# Patient Record
Sex: MALE | Race: WHITE | NOT HISPANIC OR LATINO | Employment: UNEMPLOYED | URBAN - METROPOLITAN AREA
[De-identification: names, ages, dates, MRNs, and addresses within clinical notes are randomized per-mention and may not be internally consistent; named-entity substitution may affect disease eponyms.]

---

## 2022-08-27 ENCOUNTER — HOSPITAL ENCOUNTER (INPATIENT)
Facility: HOSPITAL | Age: 62
LOS: 4 days | Discharge: HOME | DRG: 282 | End: 2022-08-31
Attending: EMERGENCY MEDICINE | Admitting: FAMILY MEDICINE
Payer: COMMERCIAL

## 2022-08-27 ENCOUNTER — APPOINTMENT (OUTPATIENT)
Dept: RADIOLOGY | Facility: HOSPITAL | Age: 62
DRG: 282 | End: 2022-08-27
Payer: COMMERCIAL

## 2022-08-27 ENCOUNTER — APPOINTMENT (EMERGENCY)
Dept: RADIOLOGY | Facility: HOSPITAL | Age: 62
DRG: 282 | End: 2022-08-27
Payer: COMMERCIAL

## 2022-08-27 DIAGNOSIS — F17.219 CIGARETTE NICOTINE DEPENDENCE WITH NICOTINE-INDUCED DISORDER: ICD-10-CM

## 2022-08-27 DIAGNOSIS — R79.89 LFT ELEVATION: ICD-10-CM

## 2022-08-27 DIAGNOSIS — K76.9 HEPATIC LESION: ICD-10-CM

## 2022-08-27 DIAGNOSIS — K85.90 PANCREATITIS: ICD-10-CM

## 2022-08-27 DIAGNOSIS — F10.10 ALCOHOL ABUSE: ICD-10-CM

## 2022-08-27 DIAGNOSIS — R16.0 LIVER MASS, RIGHT LOBE: Primary | ICD-10-CM

## 2022-08-27 PROBLEM — F17.200 NICOTINE DEPENDENCE: Status: ACTIVE | Noted: 2022-08-27

## 2022-08-27 PROBLEM — R82.90 ABNORMAL URINALYSIS: Status: ACTIVE | Noted: 2022-08-27

## 2022-08-27 LAB
ALBUMIN SERPL BCP-MCNC: 3.6 G/DL (ref 3.5–5)
ALP SERPL-CCNC: 120 U/L (ref 46–116)
ALT SERPL W P-5'-P-CCNC: 311 U/L (ref 12–78)
ANION GAP SERPL CALCULATED.3IONS-SCNC: 11 MMOL/L (ref 4–13)
AST SERPL W P-5'-P-CCNC: 239 U/L (ref 5–45)
BACTERIA UR QL AUTO: ABNORMAL /HPF
BASOPHILS # BLD AUTO: 0.07 THOUSANDS/ΜL (ref 0–0.1)
BASOPHILS NFR BLD AUTO: 1 % (ref 0–1)
BILIRUB SERPL-MCNC: 1.98 MG/DL (ref 0.2–1)
BILIRUB UR QL STRIP: ABNORMAL
BUN SERPL-MCNC: 11 MG/DL (ref 5–25)
CALCIUM SERPL-MCNC: 8.7 MG/DL (ref 8.3–10.1)
CHLORIDE SERPL-SCNC: 97 MMOL/L (ref 96–108)
CLARITY UR: CLEAR
CO2 SERPL-SCNC: 28 MMOL/L (ref 21–32)
COLOR UR: ABNORMAL
CREAT SERPL-MCNC: 1.12 MG/DL (ref 0.6–1.3)
EOSINOPHIL # BLD AUTO: 0.55 THOUSAND/ΜL (ref 0–0.61)
EOSINOPHIL NFR BLD AUTO: 4 % (ref 0–6)
ERYTHROCYTE [DISTWIDTH] IN BLOOD BY AUTOMATED COUNT: 13.3 % (ref 11.6–15.1)
FLUAV RNA RESP QL NAA+PROBE: NEGATIVE
FLUBV RNA RESP QL NAA+PROBE: NEGATIVE
GFR SERPL CREATININE-BSD FRML MDRD: 70 ML/MIN/1.73SQ M
GLUCOSE SERPL-MCNC: 111 MG/DL (ref 65–140)
GLUCOSE UR STRIP-MCNC: NEGATIVE MG/DL
HCT VFR BLD AUTO: 45 % (ref 36.5–49.3)
HGB BLD-MCNC: 14.6 G/DL (ref 12–17)
HGB UR QL STRIP.AUTO: ABNORMAL
HYALINE CASTS #/AREA URNS LPF: ABNORMAL /LPF
IMM GRANULOCYTES # BLD AUTO: 0.1 THOUSAND/UL (ref 0–0.2)
IMM GRANULOCYTES NFR BLD AUTO: 1 % (ref 0–2)
KETONES UR STRIP-MCNC: ABNORMAL MG/DL
LEUKOCYTE ESTERASE UR QL STRIP: NEGATIVE
LIPASE SERPL-CCNC: 4009 U/L (ref 73–393)
LYMPHOCYTES # BLD AUTO: 2.33 THOUSANDS/ΜL (ref 0.6–4.47)
LYMPHOCYTES NFR BLD AUTO: 16 % (ref 14–44)
MCH RBC QN AUTO: 30.9 PG (ref 26.8–34.3)
MCHC RBC AUTO-ENTMCNC: 32.4 G/DL (ref 31.4–37.4)
MCV RBC AUTO: 95 FL (ref 82–98)
MONOCYTES # BLD AUTO: 1.97 THOUSAND/ΜL (ref 0.17–1.22)
MONOCYTES NFR BLD AUTO: 13 % (ref 4–12)
NEUTROPHILS # BLD AUTO: 9.98 THOUSANDS/ΜL (ref 1.85–7.62)
NEUTS SEG NFR BLD AUTO: 65 % (ref 43–75)
NITRITE UR QL STRIP: POSITIVE
NON-SQ EPI CELLS URNS QL MICRO: ABNORMAL /HPF
NRBC BLD AUTO-RTO: 0 /100 WBCS
PH UR STRIP.AUTO: 5.5 [PH]
PLATELET # BLD AUTO: 188 THOUSANDS/UL (ref 149–390)
PMV BLD AUTO: 11.1 FL (ref 8.9–12.7)
POTASSIUM SERPL-SCNC: 3.6 MMOL/L (ref 3.5–5.3)
PROT SERPL-MCNC: 8.4 G/DL (ref 6.4–8.4)
PROT UR STRIP-MCNC: ABNORMAL MG/DL
RBC # BLD AUTO: 4.72 MILLION/UL (ref 3.88–5.62)
RBC #/AREA URNS AUTO: ABNORMAL /HPF
RSV RNA RESP QL NAA+PROBE: NEGATIVE
SARS-COV-2 RNA RESP QL NAA+PROBE: NEGATIVE
SODIUM SERPL-SCNC: 136 MMOL/L (ref 135–147)
SP GR UR STRIP.AUTO: 1.02 (ref 1–1.03)
UROBILINOGEN UR QL STRIP.AUTO: 4 E.U./DL
WBC # BLD AUTO: 15 THOUSAND/UL (ref 4.31–10.16)
WBC #/AREA URNS AUTO: ABNORMAL /HPF

## 2022-08-27 PROCEDURE — 85025 COMPLETE CBC W/AUTO DIFF WBC: CPT | Performed by: PHYSICIAN ASSISTANT

## 2022-08-27 PROCEDURE — 87086 URINE CULTURE/COLONY COUNT: CPT | Performed by: INTERNAL MEDICINE

## 2022-08-27 PROCEDURE — 99285 EMERGENCY DEPT VISIT HI MDM: CPT | Performed by: PHYSICIAN ASSISTANT

## 2022-08-27 PROCEDURE — 99223 1ST HOSP IP/OBS HIGH 75: CPT | Performed by: INTERNAL MEDICINE

## 2022-08-27 PROCEDURE — 99285 EMERGENCY DEPT VISIT HI MDM: CPT

## 2022-08-27 PROCEDURE — 74177 CT ABD & PELVIS W/CONTRAST: CPT

## 2022-08-27 PROCEDURE — 81001 URINALYSIS AUTO W/SCOPE: CPT | Performed by: PHYSICIAN ASSISTANT

## 2022-08-27 PROCEDURE — G1004 CDSM NDSC: HCPCS

## 2022-08-27 PROCEDURE — 36415 COLL VENOUS BLD VENIPUNCTURE: CPT | Performed by: PHYSICIAN ASSISTANT

## 2022-08-27 PROCEDURE — 80053 COMPREHEN METABOLIC PANEL: CPT | Performed by: PHYSICIAN ASSISTANT

## 2022-08-27 PROCEDURE — 96374 THER/PROPH/DIAG INJ IV PUSH: CPT

## 2022-08-27 PROCEDURE — C9113 INJ PANTOPRAZOLE SODIUM, VIA: HCPCS | Performed by: INTERNAL MEDICINE

## 2022-08-27 PROCEDURE — 96361 HYDRATE IV INFUSION ADD-ON: CPT

## 2022-08-27 PROCEDURE — 76705 ECHO EXAM OF ABDOMEN: CPT

## 2022-08-27 PROCEDURE — 93005 ELECTROCARDIOGRAM TRACING: CPT

## 2022-08-27 PROCEDURE — 83690 ASSAY OF LIPASE: CPT | Performed by: PHYSICIAN ASSISTANT

## 2022-08-27 PROCEDURE — 0241U HB NFCT DS VIR RESP RNA 4 TRGT: CPT | Performed by: FAMILY MEDICINE

## 2022-08-27 RX ORDER — FOLIC ACID 1 MG/1
1 TABLET ORAL DAILY
Status: DISCONTINUED | OUTPATIENT
Start: 2022-08-28 | End: 2022-08-31 | Stop reason: HOSPADM

## 2022-08-27 RX ORDER — NICOTINE 21 MG/24HR
1 PATCH, TRANSDERMAL 24 HOURS TRANSDERMAL DAILY
Status: DISCONTINUED | OUTPATIENT
Start: 2022-08-28 | End: 2022-08-31 | Stop reason: HOSPADM

## 2022-08-27 RX ORDER — ONDANSETRON 2 MG/ML
4 INJECTION INTRAMUSCULAR; INTRAVENOUS ONCE
Status: COMPLETED | OUTPATIENT
Start: 2022-08-27 | End: 2022-08-27

## 2022-08-27 RX ORDER — ACETAMINOPHEN 325 MG/1
650 TABLET ORAL EVERY 6 HOURS PRN
Status: DISCONTINUED | OUTPATIENT
Start: 2022-08-27 | End: 2022-08-31 | Stop reason: HOSPADM

## 2022-08-27 RX ORDER — PANTOPRAZOLE SODIUM 40 MG/10ML
40 INJECTION, POWDER, LYOPHILIZED, FOR SOLUTION INTRAVENOUS
Status: DISCONTINUED | OUTPATIENT
Start: 2022-08-27 | End: 2022-08-28

## 2022-08-27 RX ORDER — ONDANSETRON 2 MG/ML
4 INJECTION INTRAMUSCULAR; INTRAVENOUS EVERY 6 HOURS PRN
Status: DISCONTINUED | OUTPATIENT
Start: 2022-08-27 | End: 2022-08-31 | Stop reason: HOSPADM

## 2022-08-27 RX ORDER — SODIUM CHLORIDE, SODIUM LACTATE, POTASSIUM CHLORIDE, CALCIUM CHLORIDE 600; 310; 30; 20 MG/100ML; MG/100ML; MG/100ML; MG/100ML
150 INJECTION, SOLUTION INTRAVENOUS CONTINUOUS
Status: DISCONTINUED | OUTPATIENT
Start: 2022-08-27 | End: 2022-08-28

## 2022-08-27 RX ADMIN — SODIUM CHLORIDE 1000 ML: 0.9 INJECTION, SOLUTION INTRAVENOUS at 13:36

## 2022-08-27 RX ADMIN — ONDANSETRON 4 MG: 2 INJECTION INTRAMUSCULAR; INTRAVENOUS at 16:46

## 2022-08-27 RX ADMIN — MORPHINE SULFATE 2 MG: 2 INJECTION, SOLUTION INTRAMUSCULAR; INTRAVENOUS at 22:55

## 2022-08-27 RX ADMIN — THIAMINE HYDROCHLORIDE 100 MG: 100 INJECTION, SOLUTION INTRAMUSCULAR; INTRAVENOUS at 23:04

## 2022-08-27 RX ADMIN — SODIUM CHLORIDE, SODIUM LACTATE, POTASSIUM CHLORIDE, AND CALCIUM CHLORIDE 150 ML/HR: .6; .31; .03; .02 INJECTION, SOLUTION INTRAVENOUS at 20:09

## 2022-08-27 RX ADMIN — IOHEXOL 65 ML: 350 INJECTION, SOLUTION INTRAVENOUS at 18:19

## 2022-08-27 RX ADMIN — PANTOPRAZOLE SODIUM 40 MG: 40 INJECTION, POWDER, FOR SOLUTION INTRAVENOUS at 22:58

## 2022-08-27 NOTE — ED PROVIDER NOTES
History  Chief Complaint   Patient presents with    Abdominal Pain     C/O mid upper abd pain X 3 days, worse today  Patient reports gas, no appetite, and fatigue  Denies N/V/D     Patient is a 52-year-old white male with no significant past medical history who reports onset 2 days ago of epigastric pain, sweating and vomiting 1 hour after eating erica steak and mashed potatoes  States he only drank water yesterday, then ate couple bites of cheeseburger today with  Increased epigastric pain and vomiting  No fever  Last bm 3 days ago; states he saw "couple spots of blood" in the stool  Pt is passing flatus  No cp, sob, melena, urinary symptoms  No prior abdominal surgery  Smokes 1 ppd  Drinks socially, states "sometimes 1 beer a day, sometimes a 6 pack on weekend, some days I don't drink"  Denies illicit drug use  Reports his pain is worse when he lies supine  None       History reviewed  No pertinent past medical history  History reviewed  No pertinent surgical history  History reviewed  No pertinent family history  I have reviewed and agree with the history as documented  E-Cigarette/Vaping    E-Cigarette Use Never User      E-Cigarette/Vaping Substances    Nicotine No     THC No     CBD No     Flavoring No     Other No     Unknown No      Social History     Tobacco Use    Smoking status: Current Every Day Smoker     Packs/day: 1 00   Vaping Use    Vaping Use: Never used   Substance Use Topics    Alcohol use: Yes     Comment: Occ   Drug use: Never       Review of Systems   Constitutional: Negative for chills and fever  HENT: Negative for ear pain and sore throat  Respiratory: Negative for cough and shortness of breath  Cardiovascular: Negative for chest pain and palpitations  Gastrointestinal: Positive for abdominal pain, blood in stool, nausea and vomiting  Negative for diarrhea  Genitourinary: Negative for dysuria and hematuria     Musculoskeletal: Negative for arthralgias and back pain  Skin: Negative for color change and rash  Neurological: Negative for syncope and headaches  All other systems reviewed and are negative  Physical Exam  Physical Exam  Vitals and nursing note reviewed  Constitutional:       Appearance: He is well-developed  HENT:      Head: Normocephalic and atraumatic  Mouth/Throat:      Mouth: Mucous membranes are moist       Pharynx: Oropharynx is clear  Eyes:      Extraocular Movements: Extraocular movements intact  Pupils: Pupils are equal, round, and reactive to light  Cardiovascular:      Rate and Rhythm: Normal rate and regular rhythm  Heart sounds: Normal heart sounds  Pulmonary:      Effort: Pulmonary effort is normal       Breath sounds: Normal breath sounds  Abdominal:      General: Abdomen is flat  Palpations: Abdomen is soft  Hernia: No hernia is present  Comments: Epigastric to R upper abdominal tenderness   Genitourinary:     Rectum: Normal  Guaiac result negative  No mass or tenderness  Skin:     General: Skin is warm and dry  Capillary Refill: Capillary refill takes less than 2 seconds  Neurological:      Mental Status: He is alert           Vital Signs  ED Triage Vitals [08/27/22 1259]   Temperature Pulse Respirations Blood Pressure SpO2   99 °F (37 2 °C) 69 20 (!) 178/103 98 %      Temp Source Heart Rate Source Patient Position - Orthostatic VS BP Location FiO2 (%)   Tympanic Monitor Sitting Right arm --      Pain Score       3           Vitals:    08/27/22 1929 08/27/22 2353 08/28/22 0319 08/28/22 0806   BP: 153/89 158/88 162/85 160/86   Pulse: 77 79 76 74   Patient Position - Orthostatic VS:    Lying         Visual Acuity      ED Medications  Medications   nicotine (NICODERM CQ) 21 mg/24 hr TD 24 hr patch 1 patch (1 patch Transdermal Not Given 8/28/22 0975)   thiamine (VITAMIN B1) 100 mg in sodium chloride 0 9 % 50 mL IVPB (100 mg Intravenous New Bag 8/28/22 0959) acetaminophen (TYLENOL) tablet 650 mg (has no administration in time range)   ondansetron (ZOFRAN) injection 4 mg (has no administration in time range)   pantoprazole (PROTONIX) injection 40 mg (40 mg Intravenous Given 9/67/88 9624)   folic acid (FOLVITE) tablet 1 mg (1 mg Oral Given 8/28/22 0953)   multivitamin-minerals (CENTRUM) tablet 1 tablet (1 tablet Oral Given 8/28/22 0953)   morphine injection 2 mg (2 mg Intravenous Given 8/27/22 2255)   sodium chloride 0 9 % bolus 1,000 mL (0 mL Intravenous Stopped 8/27/22 1646)   iohexol (OMNIPAQUE) 350 MG/ML injection (MULTI-DOSE) 65 mL (65 mL Intravenous Given 8/27/22 1819)   barium (READI-CAT 2) suspension 900 mL (900 mL Oral Given 8/27/22 1646)   ondansetron (ZOFRAN) injection 4 mg (4 mg Intravenous Given 8/27/22 1646)       Diagnostic Studies  Results Reviewed     Procedure Component Value Units Date/Time    Lipase [[id number]]  (Abnormal) Collected: 08/28/22 0525    Lab Status: Final result Specimen: Blood from Arm, Right Updated: 08/28/22 0657     Lipase 1,988 u/L     Comprehensive metabolic panel [[id number]]  (Abnormal) Collected: 08/28/22 0525    Lab Status: Final result Specimen: Blood from Arm, Right Updated: 08/28/22 0651     Sodium 138 mmol/L      Potassium 4 2 mmol/L      Chloride 102 mmol/L      CO2 29 mmol/L      ANION GAP 7 mmol/L      BUN 11 mg/dL      Creatinine 0 99 mg/dL      Glucose 93 mg/dL      Calcium 8 1 mg/dL      Corrected Calcium 9 1 mg/dL       U/L       U/L      Alkaline Phosphatase 90 U/L      Total Protein 6 7 g/dL      Albumin 2 8 g/dL      Total Bilirubin 2 48 mg/dL      eGFR 81 ml/min/1 73sq m     Narrative:      [place] guidelines for Chronic Kidney Disease (CKD):     Stage 1 with normal or high GFR (GFR > 90 mL/min/1 73 square meters)    Stage 2 Mild CKD (GFR = 60-89 mL/min/1 73 square meters)    Stage 3A Moderate CKD (GFR = 45-59 mL/min/1 73 square meters)    Stage 3B Moderate CKD (GFR = 30-44 mL/min/1 73 square meters)    Stage 4 Severe CKD (GFR = 15-29 mL/min/1 73 square meters)    Stage 5 End Stage CKD (GFR <15 mL/min/1 73 square meters)  Note: GFR calculation is accurate only with a steady state creatinine    CBC and differential [[id number]]  (Abnormal) Collected: 08/28/22 0525    Lab Status: Final result Specimen: Blood from Arm, Right Updated: 08/28/22 0603     WBC 12 42 Thousand/uL      RBC 4 04 Million/uL      Hemoglobin 12 7 g/dL      Hematocrit 38 3 %      MCV 95 fL      MCH 31 4 pg      MCHC 33 2 g/dL      RDW 13 2 %      MPV 11 6 fL      Platelets 310 Thousands/uL      nRBC 0 /100 WBCs      Neutrophils Relative 58 %      Immat GRANS % 1 %      Lymphocytes Relative 18 %      Monocytes Relative 14 %      Eosinophils Relative 8 %      Basophils Relative 1 %      Neutrophils Absolute 7 24 Thousands/µL      Immature Grans Absolute 0 08 Thousand/uL      Lymphocytes Absolute 2 25 Thousands/µL      Monocytes Absolute 1 78 Thousand/µL      Eosinophils Absolute 1 01 Thousand/µL      Basophils Absolute 0 06 Thousands/µL     Urine culture [[id number]] Collected: 08/27/22 1434    Lab Status: In process Specimen: Urine Updated: 08/27/22 1931    FLU/RSV/COVID - if FLU/RSV clinically relevant [[id number]]  (Normal) Collected: 08/27/22 1735    Lab Status: Final result Specimen: Nares from Nose Updated: 08/27/22 1819     SARS-CoV-2 Negative     INFLUENZA A PCR Negative     INFLUENZA B PCR Negative     RSV PCR Negative    Narrative:      FOR PEDIATRIC PATIENTS - copy/paste COVID Guidelines URL to browser: [email or web address]    SARS-CoV-2 assay is a Nucleic Acid Amplification assay intended for the  qualitative detection of nucleic acid from SARS-CoV-2 in nasopharyngeal  swabs  Results are for the presumptive identification of SARS-CoV-2 RNA      Positive results are indicative of infection with SARS-CoV-2, the virus  causing COVID-19, but do not rule out bacterial infection or co-infection  with other viruses  Laboratories within the [place] and its  territories are required to report all positive results to the appropriate  public health authorities  Negative results do not preclude SARS-CoV-2  infection and should not be used as the sole basis for treatment or other  patient management decisions  Negative results must be combined with  clinical observations, patient history, and epidemiological information  This test has not been FDA cleared or approved  This test has been authorized by FDA under an Emergency Use Authorization  (EUA)  This test is only authorized for the duration of time the  declaration that circumstances exist justifying the authorization of the  emergency use of an in vitro diagnostic tests for detection of SARS-CoV-2  virus and/or diagnosis of COVID-19 infection under section 564(b)(1) of  the Act, 21 U  S C  [id number]-9(G)(0), unless the authorization is terminated  or revoked sooner  The test has been validated but independent review by FDA  and CLIA is pending  Test performed using [employer] Gene[employer]pert: This RT-PCR assay targets N2,  a region unique to SARS-CoV-2  A conserved region in the E-gene was chosen  for pan-Sarbecovirus detection which includes SARS-CoV-2      Urine Microscopic [[id number]]  (Abnormal) Collected: 08/27/22 1434    Lab Status: Final result Specimen: Urine, Clean Catch Updated: 08/27/22 1501     RBC, UA 2-4 /hpf      WBC, UA 0-1 /hpf      Epithelial Cells None Seen /hpf      Bacteria, UA Moderate /hpf      Hyaline Casts, UA 1-2 /lpf     UA w Reflex to Microscopic w Reflex to Culture [[id number]]  (Abnormal) Collected: 08/27/22 1434    Lab Status: Final result Specimen: Urine, Clean Catch Updated: 08/27/22 1440     Color, UA Concepcion     Clarity, UA Clear     Specific Gravity, UA 1 025     pH, UA 5 5     Leukocytes, UA Negative     Nitrite, UA Positive     Protein,  (2+) mg/dl      Glucose, UA Negative mg/dl Ketones, UA Trace mg/dl      Urobilinogen, UA 4 0 E U /dl      Bilirubin, UA Moderate     Occult Blood, UA Moderate    Lipase [[id number]]  (Abnormal) Collected: 08/27/22 1333    Lab Status: Final result Specimen: Blood from Arm, Left Updated: 08/27/22 1416     Lipase 4,009 u/L     Comprehensive metabolic panel [[id number]]  (Abnormal) Collected: 08/27/22 1333    Lab Status: Final result Specimen: Blood from Arm, Left Updated: 08/27/22 1400     Sodium 136 mmol/L      Potassium 3 6 mmol/L      Chloride 97 mmol/L      CO2 28 mmol/L      ANION GAP 11 mmol/L      BUN 11 mg/dL      Creatinine 1 12 mg/dL      Glucose 111 mg/dL      Calcium 8 7 mg/dL       U/L       U/L      Alkaline Phosphatase 120 U/L      Total Protein 8 4 g/dL      Albumin 3 6 g/dL      Total Bilirubin 1 98 mg/dL      eGFR 70 ml/min/1 73sq m     Narrative:      National Kid[employer] guidelines for Chronic Kidney Disease (CKD):     Stage 1 with normal or high GFR (GFR > 90 mL/min/1 73 square meters)    Stage 2 Mild CKD (GFR = 60-89 mL/min/1 73 square meters)    Stage 3A Moderate CKD (GFR = 45-59 mL/min/1 73 square meters)    Stage 3B Moderate CKD (GFR = 30-44 mL/min/1 73 square meters)    Stage 4 Severe CKD (GFR = 15-29 mL/min/1 73 square meters)    Stage 5 End Stage CKD (GFR <15 mL/min/1 73 square meters)  Note: GFR calculation is accurate only with a steady state creatinine    CBC and differential [[id number]]  (Abnormal) Collected: 08/27/22 1333    Lab Status: Final result Specimen: Blood from Arm, Left Updated: 08/27/22 1352     WBC 15 00 Thousand/uL      RBC 4 72 Million/uL      Hemoglobin 14 6 g/dL      Hematocrit 45 0 %      MCV 95 fL      MCH 30 9 pg      MCHC 32 4 g/dL      RDW 13 3 %      MPV 11 1 fL      Platelets 713 Thousands/uL      nRBC 0 /100 WBCs      Neutrophils Relative 65 %      Immat GRANS % 1 %      Lymphocytes Relative 16 %      Monocytes Relative 13 %      Eosinophils Relative 4 %      Basophils Relative 1 %      Neutrophils Absolute 9 98 Thousands/µL      Immature Grans Absolute 0 10 Thousand/uL      Lymphocytes Absolute 2 33 Thousands/µL      Monocytes Absolute 1 97 Thousand/µL      Eosinophils Absolute 0 55 Thousand/µL      Basophils Absolute 0 07 Thousands/µL                  CT abdomen pelvis with contrast   Final Result by Juan Christianson MD (08/27 1931)      4 cm segment 8 hepatic lesion suspicious for malignancy, likely primary malignancy or metastatic disease  13 mm maris hepatic node  The study was marked in [facility] for immediate notification  Workstation performed: [id number]         US right upper quadrant   Final Result by Izabel Garrett MD (08/27 2523)         1  Hepatic steatosis with the mass in the right lobe of the liver  Additional evaluation will be necessary to determine the nature of the mass  Maris hepatis and perihepatic lymph node enlargement  Malignancy is a diagnosis of exclusion  Evaluation with contrast-enhanced CT or MRI abdomen should be obtained  2   Contracted gallbladder with thickened wall and echogenic foci within it  Findings are not typical for acute cholecystitis  Gallbladder could also be better evaluated on the follow-up CT or MRI exam    The study was marked in EP[employer] for immediate notification        Workstation performed: [id number]                    Procedures  ECG 12 Lead Documentation Only    Date/Time: 8/27/2022 2:47 PM  Performed by: Padmaja Vasquez PA-C  Authorized by: Padmaja Vasquez PA-C     ECG reviewed by me, the ED Provider: yes    Patient location:  ED  Previous ECG:     Previous ECG:  Unavailable  Interpretation:     Interpretation: normal    Rate:     ECG rate:  87    ECG rate assessment: normal    Rhythm:     Rhythm: sinus rhythm    QRS:     QRS axis:  Normal    QRS intervals:  Normal  Other findings:     Other findings: LAE    Comments:      Sinus rhythm with premature supraventricular complexes              ED Course  ED Course as of 08/28/22 1110   Sat Aug 27, 2022   [address] [place] texted hospitalist Dr Km Chapa 20yo+    Wilder Sparrow Most Recent Value   SBIRT (23 yo +)    In order to provide better care to our patients, we are screening all of our patients for alcohol and drug use  Would it be okay to ask you these screening questions? No Filed at: 08/27/2022 1334                    MDM  Number of Diagnoses or Management Options  LFT elevation: new and requires workup  Liver mass, right lobe: new and requires workup  Pancreatitis: new and requires workup  Diagnosis management comments: 78-year-old white male presenting with epigastric pain and vomiting beginning 2 days prior  Patient with elevated wbc, elevated LFTs, and lipase  R liver mass on US and CT  Concern for malignancy   Admitted to hospitalist service        Amount and/or Complexity of Data Reviewed  Clinical lab tests: reviewed  Tests in the radiology section of CPT®: reviewed  Tests in the medicine section of CPT®: reviewed  Decide to obtain previous medical records or to obtain history from someone other than the patient: yes  Review and summarize past medical records: yes  Discuss the patient with other providers: yes  Independent visualization of images, tracings, or specimens: yes    Risk of Complications, Morbidity, and/or Mortality  Presenting problems: moderate  Diagnostic procedures: moderate  Management options: moderate    Patient Progress  Patient progress: stable      Disposition  Final diagnoses:   Liver mass, right lobe   LFT elevation   Pancreatitis     Time reflects when diagnosis was documented in both MDM as applicable and the Disposition within this note     Time User Action Codes Description Comment    8/27/2022  4:52 PM Celia Price Add [R16 0] Liver mass, right lobe     8/27/2022  4:52 PM Celia Price Add [R79 89] LFT elevation     8/27/2022  4:52 PM Celia Price Add [K85 90] Pancreatitis     8/27/2022  7:33 PM Julissalucia Kaplan Add [F10 10] Alcohol abuse       ED Disposition     ED Disposition   Admit    Condition   Stable    Date/Time   Sat Aug 27, 2022  4:51 PM    Comment   Case was discussed with Dr Stacy Chung and the patient's admission status was agreed to be Admission Status: inpatient status to the service of Dr Stacy Chung    Follow-up Information    None         There are no discharge medications for this patient  No discharge procedures on file      PDMP Review     None          ED Provider  Electronically Signed by           Chinedu Jerez PA-C  08/28/22 5288

## 2022-08-27 NOTE — H&P
History and Physical - [facility]    Patient Information: Aaron Living 65 y o  male MRN: [id number]  Unit/Bed#: ED 07 Encounter: [id number]  Admitting Physician: Ann-Marie Mckee MD  PCP: No primary care provider on file  Date of Admission:  08/27/22        Hospital Problem List:     Principal Problem:    Acute pancreatitis  Active Problems:    Abnormal LFTs    Alcohol abuse    Nicotine dependence    Abnormal urinalysis      Assessment/Plan:    * Acute pancreatitis  Assessment & Plan  Presented with epigastric and right upper quadrant pain for 3 days associated with nausea in nonbloody nonbilious vomiting  Symptoms worsened by and followed by eating  Denies any fever or chills  Noted to have elevated lipase of 4000, abnormal LFT  History of daily alcohol use  Mild leukocytosis  Other vital signs stable  Right upper quadrant and epigastric fullness present  · NPO  · IV fluids LR at 150 cc/hour  · Monitor abdominal exam  · Trend LFT, lipase  · Follow-up CT scan finding  · Lipid profile  · GI evaluation    Alcohol abuse  Assessment & Plan  Drinks about 3-4 beers on daily basis, last drink was 3 days ago  Currently appears anxious but cooperative  · Discussed alcohol cessation, patient is not interested  · Thiamine, folic acid, multivitamin  · CIWA protocol    Abnormal LFTs  Assessment & Plan  Noted to have elevated AST and ALT  Bilirubin 1 9, alkaline  Mildly elevated 120  Ultrasound the right upper quadrant revealed hepatic steatosis with mass in right lower lobe  Also noted to have maris hepatic and perihepatic lymph node enlargement  Gallbladder noted to be contracted with thickened walled and echogenic foci    · Trend LFT  · Hepatitis panel  · Check PT/INR,  · AFP  · Follow-up CTs finding  · GI evaluation    Nicotine dependence  Assessment & Plan  Smokes about 1 pack a day   Offered nicotine replacement therapy but patient declines    Abnormal urinalysis  Assessment & Plan  UA noted to have 2+ protein, positive nitrite, negative leukocyte, trace ketone, moderate bilirubin, blood, 2-4 RBC, 0-1 WBC, moderate bacteria  Patient denies any urinary complaints except frequency  Denies any hematuria or dysuria  · Monitor          VTE Prophylaxis: Early ambulation  / sequential compression device   Code Status: Level 1 - Full Code    Anticipated Length of Stay:  Patient will be admitted on an Inpatient basis with an anticipated length of stay of  > 2 midnights  Justification for Hospital Stay:  Abnormal LFT, pancreatitis    Total Time for Visit, including Counseling / Coordination of Care: 45 minutes  Greater than 50% of this total time spent on direct patient counseling and coordination of care  Chief Complaint:     Abdominal Pain (C/O mid upper abd pain X 3 days, worse today  Patient reports gas, no appetite, and fatigue  Denies N/V/D)    History of Present Illness:    Ry Scott is a 65 y o  male with no known past medical history who presents with abdominal pain for 2 days  Patient reported that he developed epigastric pain associated with sweating and vomiting 1 hour after eating erica steak and mashed potatoes  Due to his symptoms he mainly drink water next day and subsequently attempted to have some bites of cheeseburger and subsequently had increasing epigastric pain and vomiting  Patient's pain continued and he presented to ED for further evaluation  In ED patient was noted to have T-max of maintain degree F blood pressure was elevated on presentation 178/103 other vital signs were stable  Workup revealed elevated lipase, elevated LFT and leukocytosis  Ultrasound of the right upper quadrant revealed hepatic steatosis with mass in right lobe of liver  Patient was also noted to have maris hepatic and perihepatic lymph node enlargement  Gallbladder was contracted with thickened walled an echogenic foci  Patient was given IV fluids, IV Zofran    CT scan of the abdomen pelvis was ordered with contrast and patient was subsequently referred for admission  Patient still reports epigastric and right upper quadrant pain but appears comfortable  He denies any fever, chills, chest pain, shortness of breath, hematemesis, melena, dysuria  He reported that he did not have bowel movements but had bowel movements after drinking contrast for CT scan  Patient does not take any medications and smokes about pack a day  He also reported that he has been drinking beer almost daily basis at least 3 4 cans a day and last drink was 3 days ago before symptoms started  Patient reports that he has not been receiving maintenance healthcare and he does not have PCP  He has last seen physician was at age of 17  Review of Systems:    Review of Systems   Constitutional: Positive for appetite change  Respiratory: Negative for shortness of breath  Cardiovascular: Negative for chest pain  Gastrointestinal: Positive for abdominal pain, blood in stool, nausea and vomiting  Negative for diarrhea  Past Medical and Surgical History:     History reviewed  No pertinent past medical history  History reviewed  No pertinent surgical history  Meds/Allergies:    PTA meds:   None       Allergies: No Known Allergies  History:     Marital Status: Single     Substance Use History:   Social History     Substance and Sexual Activity   Alcohol Use Yes    Comment: Occ  Social History     Tobacco Use   Smoking Status Current Every Day Smoker    Packs/day: 1 00   Smokeless Tobacco Not on file     Social History     Substance and Sexual Activity   Drug Use Never       Family History:    History reviewed  No pertinent family history      Physical Exam:     Vitals:   Blood Pressure: 167/81 (08/27/22 1730)  Pulse: 74 (08/27/22 1730)  Temperature: 99 °F (37 2 °C) (08/27/22 1259)  Temp Source: Tympanic (08/27/22 1259)  Respirations: 18 (08/27/22 1730)  Height: 5' 7" (170 2 cm) (08/27/22 1335)  Weight - Scale: 66 9 kg (147 lb 7 8 oz) (08/27/22 1335)  SpO2: 99 % (08/27/22 1730)    Physical Exam  Constitutional:       General: He is not in acute distress  Appearance: He is not ill-appearing  HENT:      Head: Normocephalic and atraumatic  Cardiovascular:      Rate and Rhythm: Normal rate  Pulmonary:      Effort: Pulmonary effort is normal  No respiratory distress  Breath sounds: No wheezing, rhonchi or rales  Chest:      Chest wall: No tenderness  Abdominal:      General: Bowel sounds are normal  There is distension (Mild)  Palpations: Abdomen is soft  Tenderness: There is abdominal tenderness in the right upper quadrant and epigastric area  There is no guarding or rebound  Musculoskeletal:      Cervical back: Neck supple  Right lower leg: No edema  Left lower leg: No edema  Skin:     General: Skin is warm and dry  Findings: No rash  Neurological:      General: No focal deficit present  Mental Status: He is alert and oriented to person, place, and time  Mental status is at baseline  Cranial Nerves: No cranial nerve deficit  Sensory: No sensory deficit  Lab Results: I have personally reviewed pertinent reports  Results from last 7 days   Lab Units 08/27/22  1333   WBC Thousand/uL 15 00*   HEMOGLOBIN g/dL 14 6   HEMATOCRIT % 45 0   PLATELETS Thousands/uL 188   NEUTROS PCT % 65   LYMPHS PCT % 16   MONOS PCT % 13*   EOS PCT % 4     Results from last 7 days   Lab Units 08/27/22  1333   POTASSIUM mmol/L 3 6   CHLORIDE mmol/L 97   CO2 mmol/L 28   BUN mg/dL 11   CREATININE mg/dL 1 12   CALCIUM mg/dL 8 7   ALK PHOS U/L 120*   ALT U/L 311*   AST U/L 239*           Imaging: I have personally reviewed pertinent reports  US right upper quadrant    Result Date: 8/27/2022  Narrative: RIGHT UPPER QUADRANT ULTRASOUND INDICATION:     epigastric pain, vomiting after eating beginning thursday   COMPARISON:  None TECHNIQUE:   Real-time ultrasound of the right upper quadrant was performed with a curvilinear transducer with both volumetric sweeps and still imaging techniques  FINDINGS: PANCREAS:  Portions of the pancreas are obscured by bowel gas  Visualized portions of the pancreas are unremarkable  AORTA AND IVC:  Visualized portions are normal for patient age  LIVER: Size:  Within normal range  The liver measures 16 3 cm in the midclavicular line  Contour:  Surface contour is smooth  Parenchyma: There is moderate diffuse increased echogenicity with smooth echotexture and acoustic beam attenuation  Most consistent with moderate hepatic steatosis  There is a hypoechoic mass in the right lobe of the liver measuring 3 7 x 3 2 x 3 7 cm  Limited imaging of the main portal vein shows it to be patent and hepatopetal  There are enlarged jeana hepatis nodes and perihepatic lymph nodes as well BILIARY: There is thickened gallbladder wall measuring 5 mm  Gallbladder is contracted  Echogenic non shadowing foci are demonstrated in the gallbladder  Due to the contracted gallbladder is difficult to determine if they represent small polyps or calculi  There is no pericholecystic fluid  Roberto Sarahy sign is negative  No intrahepatic biliary dilatation  CBD measures 4 0 mm  No choledocholithiasis  KIDNEY: Right kidney measures 11 4 x 6 2 x 7 1 cm  Volume 264 8 mL Kidney within normal limits  ASCITES:   None  Impression: 1  Hepatic steatosis with the mass in the right lobe of the liver  Additional evaluation will be necessary to determine the nature of the mass  Jeana hepatis and perihepatic lymph node enlargement  Malignancy is a diagnosis of exclusion  Evaluation with contrast-enhanced CT or MRI abdomen should be obtained  2   Contracted gallbladder with thickened wall and echogenic foci within it  Findings are not typical for acute cholecystitis  Gallbladder could also be better evaluated on the follow-up CT or MRI exam  The study was marked in EPIC for immediate notification  Workstation performed: [id number]       US right upper quadrant   Final Result         1  Hepatic steatosis with the mass in the right lobe of the liver  Additional evaluation will be necessary to determine the nature of the mass  Jeana hepatis and perihepatic lymph node enlargement  Malignancy is a diagnosis of exclusion  Evaluation with contrast-enhanced CT or MRI abdomen should be obtained  2   Contracted gallbladder with thickened wall and echogenic foci within it  Findings are not typical for acute cholecystitis  Gallbladder could also be better evaluated on the follow-up CT or MRI exam    The study was marked in EPIC for immediate notification  Workstation performed: [id number]         CT abdomen pelvis with contrast    (Results Pending)       EKG, Pathology, and Other Studies Reviewed on Admission:   · EKG sinus rhythm at 87 beats per minute with PVCs, LAE    [employer]/EPIC Records Reviewed: Yes     ** Please Note: "This note has been constructed using a voice recognition system  Therefore there may be syntax, spelling, and/or grammatical errors   Please call if you have any questions  "**

## 2022-08-27 NOTE — ASSESSMENT & PLAN NOTE
Drinks about 3-4 beers on daily basis, last drink was 8/25/22  · Discussed alcohol cessation, patient is not interested    · Thiamine, folic acid, multivitamin  · CIWA protocol

## 2022-08-27 NOTE — PLAN OF CARE
Problem: GASTROINTESTINAL - ADULT  Goal: Minimal or absence of nausea and/or vomiting  Description: INTERVENTIONS:  - Administer IV fluids if ordered to ensure adequate hydration  - Maintain NPO status until nausea and vomiting are resolved  - Nasogastric tube if ordered  - Administer ordered antiemetic medications as needed  - Provide nonpharmacologic comfort measures as appropriate  - Advance diet as tolerated, if ordered  - Consider nutrition services referral to assist patient with adequate nutrition and appropriate food choices  Outcome: Progressing  Goal: Maintains or returns to baseline bowel function  Description: INTERVENTIONS:  - Assess bowel function  - Encourage oral fluids to ensure adequate hydration  - Administer IV fluids if ordered to ensure adequate hydration  - Administer ordered medications as needed  - Encourage mobilization and activity  - Consider nutritional services referral to assist patient with adequate nutrition and appropriate food choices  Outcome: Progressing  Goal: Maintains adequate nutritional intake  Description: INTERVENTIONS:  - Monitor percentage of each meal consumed  - Identify factors contributing to decreased intake, treat as appropriate  - Assist with meals as needed  - Monitor I&O, weight, and lab values if indicated  - Obtain nutrition services referral as needed  Outcome: Progressing  Goal: Establish and maintain optimal ostomy function  Description: INTERVENTIONS:  - Assess bowel function  - Encourage oral fluids to ensure adequate hydration  - Administer IV fluids if ordered to ensure adequate hydration   - Administer ordered medications as needed  - Encourage mobilization and activity  - Nutrition services referral to assist patient with appropriate food choices  - Assess stoma site  - Consider wound care consult   Outcome: Progressing  Goal: Oral mucous membranes remain intact  Description: INTERVENTIONS  - Assess oral mucosa and hygiene practices  - Implement preventative oral hygiene regimen  - Implement oral medicated treatments as ordered  - Initiate Nutrition services referral as needed  Outcome: Progressing     Problem: METABOLIC, FLUID AND ELECTROLYTES - ADULT  Goal: Electrolytes maintained within normal limits  Description: INTERVENTIONS:  - Monitor labs and assess patient for signs and symptoms of electrolyte imbalances  - Administer electrolyte replacement as ordered  - Monitor response to electrolyte replacements, including repeat lab results as appropriate  - Instruct patient on fluid and nutrition as appropriate  Outcome: Progressing  Goal: Fluid balance maintained  Description: INTERVENTIONS:  - Monitor labs   - Monitor I/O and WT  - Instruct patient on fluid and nutrition as appropriate  - Assess for signs & symptoms of volume excess or deficit  Outcome: Progressing  Goal: Glucose maintained within target range  Description: INTERVENTIONS:  - Monitor Blood Glucose as ordered  - Assess for signs and symptoms of hyperglycemia and hypoglycemia  - Administer ordered medications to maintain glucose within target range  - Assess nutritional intake and initiate nutrition service referral as needed  Outcome: Progressing

## 2022-08-27 NOTE — ED NOTES
Ultrasound is aware of order and will be here within the hour  RN and PA made aware       Dany Valdes RN  08/27/22 6739

## 2022-08-28 PROBLEM — K76.9 HEPATIC LESION: Status: ACTIVE | Noted: 2022-08-27

## 2022-08-28 LAB
AFP-TM SERPL-MCNC: 8.2 NG/ML (ref 0.5–8)
ALBUMIN SERPL BCP-MCNC: 2.8 G/DL (ref 3.5–5)
ALP SERPL-CCNC: 90 U/L (ref 46–116)
ALT SERPL W P-5'-P-CCNC: 218 U/L (ref 12–78)
ANION GAP SERPL CALCULATED.3IONS-SCNC: 7 MMOL/L (ref 4–13)
AST SERPL W P-5'-P-CCNC: 164 U/L (ref 5–45)
BACTERIA UR CULT: NORMAL
BASOPHILS # BLD AUTO: 0.06 THOUSANDS/ΜL (ref 0–0.1)
BASOPHILS NFR BLD AUTO: 1 % (ref 0–1)
BILIRUB SERPL-MCNC: 2.48 MG/DL (ref 0.2–1)
BUN SERPL-MCNC: 11 MG/DL (ref 5–25)
CALCIUM ALBUM COR SERPL-MCNC: 9.1 MG/DL (ref 8.3–10.1)
CALCIUM SERPL-MCNC: 8.1 MG/DL (ref 8.3–10.1)
CHLORIDE SERPL-SCNC: 102 MMOL/L (ref 96–108)
CHOLEST SERPL-MCNC: 124 MG/DL
CO2 SERPL-SCNC: 29 MMOL/L (ref 21–32)
CREAT SERPL-MCNC: 0.99 MG/DL (ref 0.6–1.3)
EOSINOPHIL # BLD AUTO: 1.01 THOUSAND/ΜL (ref 0–0.61)
EOSINOPHIL NFR BLD AUTO: 8 % (ref 0–6)
ERYTHROCYTE [DISTWIDTH] IN BLOOD BY AUTOMATED COUNT: 13.2 % (ref 11.6–15.1)
GFR SERPL CREATININE-BSD FRML MDRD: 81 ML/MIN/1.73SQ M
GLUCOSE SERPL-MCNC: 93 MG/DL (ref 65–140)
HCT VFR BLD AUTO: 38.3 % (ref 36.5–49.3)
HDLC SERPL-MCNC: 28 MG/DL
HGB BLD-MCNC: 12.7 G/DL (ref 12–17)
IMM GRANULOCYTES # BLD AUTO: 0.08 THOUSAND/UL (ref 0–0.2)
IMM GRANULOCYTES NFR BLD AUTO: 1 % (ref 0–2)
INR PPP: 1.22 (ref 0.84–1.19)
LDLC SERPL CALC-MCNC: 80 MG/DL (ref 0–100)
LIPASE SERPL-CCNC: 1988 U/L (ref 73–393)
LYMPHOCYTES # BLD AUTO: 2.25 THOUSANDS/ΜL (ref 0.6–4.47)
LYMPHOCYTES NFR BLD AUTO: 18 % (ref 14–44)
MCH RBC QN AUTO: 31.4 PG (ref 26.8–34.3)
MCHC RBC AUTO-ENTMCNC: 33.2 G/DL (ref 31.4–37.4)
MCV RBC AUTO: 95 FL (ref 82–98)
MONOCYTES # BLD AUTO: 1.78 THOUSAND/ΜL (ref 0.17–1.22)
MONOCYTES NFR BLD AUTO: 14 % (ref 4–12)
NEUTROPHILS # BLD AUTO: 7.24 THOUSANDS/ΜL (ref 1.85–7.62)
NEUTS SEG NFR BLD AUTO: 58 % (ref 43–75)
NRBC BLD AUTO-RTO: 0 /100 WBCS
PLATELET # BLD AUTO: 132 THOUSANDS/UL (ref 149–390)
PMV BLD AUTO: 11.6 FL (ref 8.9–12.7)
POTASSIUM SERPL-SCNC: 4.2 MMOL/L (ref 3.5–5.3)
PROT SERPL-MCNC: 6.7 G/DL (ref 6.4–8.4)
PROTHROMBIN TIME: 15.6 SECONDS (ref 11.6–14.5)
RBC # BLD AUTO: 4.04 MILLION/UL (ref 3.88–5.62)
SODIUM SERPL-SCNC: 138 MMOL/L (ref 135–147)
TRIGL SERPL-MCNC: 80 MG/DL
WBC # BLD AUTO: 12.42 THOUSAND/UL (ref 4.31–10.16)

## 2022-08-28 PROCEDURE — 82105 ALPHA-FETOPROTEIN SERUM: CPT | Performed by: INTERNAL MEDICINE

## 2022-08-28 PROCEDURE — 85025 COMPLETE CBC W/AUTO DIFF WBC: CPT | Performed by: INTERNAL MEDICINE

## 2022-08-28 PROCEDURE — C9113 INJ PANTOPRAZOLE SODIUM, VIA: HCPCS | Performed by: INTERNAL MEDICINE

## 2022-08-28 PROCEDURE — 99254 IP/OBS CNSLTJ NEW/EST MOD 60: CPT | Performed by: INTERNAL MEDICINE

## 2022-08-28 PROCEDURE — 85610 PROTHROMBIN TIME: CPT | Performed by: INTERNAL MEDICINE

## 2022-08-28 PROCEDURE — 99233 SBSQ HOSP IP/OBS HIGH 50: CPT | Performed by: PHYSICIAN ASSISTANT

## 2022-08-28 PROCEDURE — 80053 COMPREHEN METABOLIC PANEL: CPT | Performed by: INTERNAL MEDICINE

## 2022-08-28 PROCEDURE — 83690 ASSAY OF LIPASE: CPT | Performed by: INTERNAL MEDICINE

## 2022-08-28 PROCEDURE — 80061 LIPID PANEL: CPT | Performed by: INTERNAL MEDICINE

## 2022-08-28 RX ORDER — PANTOPRAZOLE SODIUM 40 MG/1
40 TABLET, DELAYED RELEASE ORAL
Status: DISCONTINUED | OUTPATIENT
Start: 2022-08-29 | End: 2022-08-31 | Stop reason: HOSPADM

## 2022-08-28 RX ORDER — LANOLIN ALCOHOL/MO/W.PET/CERES
100 CREAM (GRAM) TOPICAL DAILY
Status: DISCONTINUED | OUTPATIENT
Start: 2022-08-29 | End: 2022-08-31 | Stop reason: HOSPADM

## 2022-08-28 RX ORDER — OXYCODONE HYDROCHLORIDE 5 MG/1
2.5 TABLET ORAL EVERY 6 HOURS PRN
Status: DISCONTINUED | OUTPATIENT
Start: 2022-08-28 | End: 2022-08-31 | Stop reason: HOSPADM

## 2022-08-28 RX ADMIN — FOLIC ACID 1 MG: 1 TABLET ORAL at 09:53

## 2022-08-28 RX ADMIN — PANTOPRAZOLE SODIUM 40 MG: 40 INJECTION, POWDER, FOR SOLUTION INTRAVENOUS at 09:53

## 2022-08-28 RX ADMIN — THIAMINE HYDROCHLORIDE 100 MG: 100 INJECTION, SOLUTION INTRAMUSCULAR; INTRAVENOUS at 09:59

## 2022-08-28 RX ADMIN — SODIUM CHLORIDE, SODIUM LACTATE, POTASSIUM CHLORIDE, AND CALCIUM CHLORIDE 150 ML/HR: .6; .31; .03; .02 INJECTION, SOLUTION INTRAVENOUS at 03:41

## 2022-08-28 RX ADMIN — Medication 1 TABLET: at 09:53

## 2022-08-28 NOTE — ASSESSMENT & PLAN NOTE
· UA noted to have 2+ protein, positive nitrite, negative leukocyte, trace ketone, moderate bilirubin, blood, 2-4 RBC, 0-1 WBC, moderate bacteria  · Patient denies any urinary complaints except frequency  Denies any hematuria or dysuria  · Monitor symptoms  No abx

## 2022-08-28 NOTE — ASSESSMENT & PLAN NOTE
· US RUQ reviewed  · CT a/p reviewed  · Trend LFT daily  · Hepatitis panel yet to be collected  · Check PT/INR, AFP  · GI evaluation

## 2022-08-28 NOTE — ASSESSMENT & PLAN NOTE
Presented with epigastric and right upper quadrant pain for 3 days associated with nausea in nonbloody nonbilious vomiting  Symptoms worsened by and followed by eating    · NPO  · IVF  · Monitor abdominal exam  · Trend LFT  · GI evaluation

## 2022-08-28 NOTE — UTILIZATION REVIEW
Initial Clinical Review    Admission: Date/Time/Statement:   Admission Orders (From admission, onward)     Ordered        08/27/22 [address]  Once                      Orders Placed This Encounter   Procedures    INPATIENT ADMISSION     Standing Status:   Standing     Number of Occurrences:   1     Order Specific Question:   Level of Care     Answer:   Med Surg [16]     Order Specific Question:   Estimated length of stay     Answer:   More than 2 Midnights     Order Specific Question:   Certification     Answer:   I certify that inpatient services are medically necessary for this patient for a duration of greater than two midnights  See H&P and MD Progress Notes for additional information about the patient's course of treatment  ED Arrival Information     Expected   -    Arrival   8/27/2022 12:49    Acuity   Urgent            Means of arrival   Walk-In    Escorted by   Self    Service   Hospitalist    Admission type   Urgent            Arrival complaint   abd pain           Chief Complaint   Patient presents with    Abdominal Pain     C/O mid upper abd pain X 3 days, worse today  Patient reports gas, no appetite, and fatigue  Denies N/V/D     Initial Presentation:   61-year-old white male with no significant past medical history who reports onset 2 days ago of epigastric pain, sweating and vomiting 1 hour after eating erica steak and mashed potatoes  States he only drank water yesterday, then ate couple bites of cheeseburger today with  Increased epigastric pain and vomiting  No fever  Last bm 3 days ago; states he saw "couple spots of blood" in the stool  Pt is passing flatus  No cp, sob, melena, urinary symptoms  No prior abdominal surgery  Smokes 1 ppd  Drinks socially  Denies illicit drug use  Reports his pain is worse when he lies supine  Admission work-up showing elevated Lipase, LFT's, hepatic lesion suspicious for malignancy on imaging    Admitted to inpatient status for acute pancreatitis  Started on IVF & pain mgt  Date: 8/28/22   Day 2:   Remains NPO with IVF maintained for acute pancreatitis  Lipase trending downward  GI consulted  ED Triage Vitals [08/27/22 1259]   Temperature Pulse Respirations Blood Pressure SpO2   99 °F (37 2 °C) 69 20 (!) 178/103 98 %      Temp Source Heart Rate Source Patient Position - Orthostatic VS BP Location FiO2 (%)   Tympanic Monitor Sitting Right arm --      Pain Score       3          Wt Readings from Last 1 Encounters:   08/27/22 66 9 kg (147 lb 7 8 oz)     Additional Vital Signs:   Date/Time Temp Pulse Resp BP MAP (mmHg) SpO2 O2 Device Patient Position - Orthostatic VS   08/28/22 08:06:12 98 5 °F (36 9 °C) 74 18 160/86 111 97 % None (Room air) Lying   08/28/22 03:19:53 99 6 °F (37 6 °C) 76 18 162/85 111 95 % -- --   08/27/22 23:53:07 99 3 °F (37 4 °C) 79 18 158/88 111 95 % -- --   08/27/22 1929 99 1 °F (37 3 °C) 77 19 153/89 -- -- -- --   08/27/22 1730 -- 74 18 167/81 116 99 % None (Room air) Lying   08/27/22 1715 -- 76 18 162/99 125 97 % None (Room air) Lying   08/27/22 1630 -- -- -- 169/79 114 -- -- Lying   08/27/22 1600 -- 81 20 186/87 Abnormal  125 98 % None (Room air) Lying   08/27/22 1500 -- 80 20 136/74 97 99 % None (Room air) Lying   08/27/22 1431 -- 82 20 172/89 Abnormal  124 100 % None (Room air) Lying   08/27/22 1259 99 °F (37 2 °C) 69 20 178/103 Abnormal  -- 98 % Nasal cannula Sitting     Pertinent Labs/Diagnostic Test Results:   CT abdomen pelvis with contrast   Final Result  (08/27 1931)      4 cm segment 8 hepatic lesion suspicious for malignancy, likely primary malignancy or metastatic disease  13 mm jeana hepatic node  US right upper quadrant   Final Result  (08/27 1553)      1  Hepatic steatosis with the mass in the right lobe of the liver  Additional evaluation will be necessary to determine the nature of the mass  Jeana hepatis and perihepatic lymph node enlargement  Malignancy is a diagnosis of exclusion  Evaluation with contrast-enhanced CT or MRI abdomen should be obtained  2   Contracted gallbladder with thickened wall and echogenic foci within it  Findings are not typical for acute cholecystitis    Gallbladder could also be better evaluated on the follow-up CT or MRI exam      Results from last 7 days   Lab Units 08/27/22  1735   SARS-COV-2  Negative     Results from last 7 days   Lab Units 08/28/22  0525 08/27/22  1333   WBC Thousand/uL 12 42* 15 00*   HEMOGLOBIN g/dL 12 7 14 6   HEMATOCRIT % 38 3 45 0   PLATELETS Thousands/uL 132* 188   NEUTROS ABS Thousands/µL 7 24 9 98*     Results from last 7 days   Lab Units 08/28/22  0525 08/27/22  1333   SODIUM mmol/L 138 136   POTASSIUM mmol/L 4 2 3 6   CHLORIDE mmol/L 102 97   CO2 mmol/L 29 28   ANION GAP mmol/L 7 11   BUN mg/dL 11 11   CREATININE mg/dL 0 99 1 12   EGFR ml/min/1 73sq m 81 70   CALCIUM mg/dL 8 1* 8 7     Results from last 7 days   Lab Units 08/28/22  0525 08/27/22  1333   AST U/L 164* 239*   ALT U/L 218* 311*   ALK PHOS U/L 90 120*   TOTAL PROTEIN g/dL 6 7 8 4   ALBUMIN g/dL 2 8* 3 6   TOTAL BILIRUBIN mg/dL 2 48* 1 98*     Results from last 7 days   Lab Units 08/28/22  0525 08/27/22  1333   GLUCOSE RANDOM mg/dL 93 111     Results from last 7 days   Lab Units 08/28/22  0525   PROTIME seconds 15 6*   INR  1 22*     Results from last 7 days   Lab Units 08/28/22  0525 08/27/22  1333   LIPASE u/L 1,988* 4,009*     Results from last 7 days   Lab Units 08/27/22  1434   CLARITY UA  Clear   COLOR UA  Concepcion   SPEC GRAV UA  1 025   PH UA  5 5   GLUCOSE UA mg/dl Negative   KETONES UA mg/dl Trace*   BLOOD UA  Moderate*   PROTEIN UA mg/dl 100 (2+)*   NITRITE UA  Positive*   BILIRUBIN UA  Moderate*   UROBILINOGEN UA E U /dl 4 0*   LEUKOCYTES UA  Negative   WBC UA /hpf 0-1   RBC UA /hpf 2-4   BACTERIA UA /hpf Moderate*   EPITHELIAL CELLS WET PREP /hpf None Seen     Results from last 7 days   Lab Units 08/27/22  1735   INFLUENZA A PCR  Negative   INFLUENZA B PCR Negative   RSV PCR  Negative     ED Treatment:   Medication Administration from 08/27/2022 1249 to 08/27/2022 1849       Date/Time Order Dose Route Action     08/27/2022 1336 sodium chloride 0 9 % bolus 1,000 mL 1,000 mL Intravenous New Bag     08/27/2022 1819 iohexol (OMNIPAQUE) 350 MG/ML injection (MULTI-DOSE) 65 mL 65 mL Intravenous Given     08/27/2022 1646 barium (READI-CAT 2) suspension 900 mL 900 mL Oral Given     08/27/2022 1646 ondansetron (ZOFRAN) injection 4 mg 4 mg Intravenous Given        History reviewed  No pertinent past medical history  Present on Admission:   Acute pancreatitis   Hepatic lesion with elevated LFTs   Alcohol abuse   Nicotine dependence   Abnormal urinalysis    Admitting Diagnosis: Pancreatitis [K85 90]  Abdominal pain [R10 9]  Liver mass, right lobe [R16 0]  LFT elevation [R79 89]  Age/Sex: 65 y o  male  Admission Orders:  Scd/foot pumps  Cont pulse ox  CIWA protocol  Aspiration precautions  Consult GI    Scheduled Medications:  folic acid, 1 mg, Oral, Daily  multivitamin-minerals, 1 tablet, Oral, Daily  nicotine, 1 patch, Transdermal, Daily  pantoprazole, 40 mg, Intravenous, Q24H [address]  thiamine, 100 mg, Intravenous, Daily    Continuous IV Infusions:  lactated ringers, 150 mL/hr, Intravenous, Continuous    PRN Meds:  acetaminophen, 650 mg, Oral, Q6H PRN  morphine injection, 2 mg, Intravenous, Q6H PRN  ondansetron, 4 mg, Intravenous, Q6H PRN    Network Utilization Review Department  ATTENTION: Please call with any questions or concerns to [phone] and carefully listen to the prompts so that you are directed to the right person  All voicemails are confidential   Chet Zapata all requests for admission clinical reviews, approved or denied determinations and any other requests to dedicated fax number below belonging to the campus where the patient is receiving treatment   List of dedicated fax numbers for the Facilities:  FACILITY NAME UR FAX NUMBER   ADMISSION DENIALS (Administrative/Medical Necessity) [phone]   [address] (Maternity/NICU/Pediatrics) [address] [facility],[address] Floor [facility] [address] [facility]  [phone]   Bernadette [address] [place] [address] [address] [place] [address] Traci Gavin [address] [facility] [phone]

## 2022-08-28 NOTE — PLAN OF CARE
Problem: GASTROINTESTINAL - ADULT  Goal: Minimal or absence of nausea and/or vomiting  Description: INTERVENTIONS:  - Administer IV fluids if ordered to ensure adequate hydration  - Maintain NPO status until nausea and vomiting are resolved  - Nasogastric tube if ordered  - Administer ordered antiemetic medications as needed  - Provide nonpharmacologic comfort measures as appropriate  - Advance diet as tolerated, if ordered  - Consider nutrition services referral to assist patient with adequate nutrition and appropriate food choices  Outcome: Progressing  Goal: Maintains or returns to baseline bowel function  Description: INTERVENTIONS:  - Assess bowel function  - Encourage oral fluids to ensure adequate hydration  - Administer IV fluids if ordered to ensure adequate hydration  - Administer ordered medications as needed  - Encourage mobilization and activity  - Consider nutritional services referral to assist patient with adequate nutrition and appropriate food choices  Outcome: Progressing  Goal: Maintains adequate nutritional intake  Description: INTERVENTIONS:  - Monitor percentage of each meal consumed  - Identify factors contributing to decreased intake, treat as appropriate  - Assist with meals as needed  - Monitor I&O, weight, and lab values if indicated  - Obtain nutrition services referral as needed  Outcome: Progressing  Goal: Establish and maintain optimal ostomy function  Description: INTERVENTIONS:  - Assess bowel function  - Encourage oral fluids to ensure adequate hydration  - Administer IV fluids if ordered to ensure adequate hydration   - Administer ordered medications as needed  - Encourage mobilization and activity  - Nutrition services referral to assist patient with appropriate food choices  - Assess stoma site  - Consider wound care consult   Outcome: Progressing  Goal: Oral mucous membranes remain intact  Description: INTERVENTIONS  - Assess oral mucosa and hygiene practices  - Implement preventative oral hygiene regimen  - Implement oral medicated treatments as ordered  - Initiate Nutrition services referral as needed  Outcome: Progressing     Problem: METABOLIC, FLUID AND ELECTROLYTES - ADULT  Goal: Electrolytes maintained within normal limits  Description: INTERVENTIONS:  - Monitor labs and assess patient for signs and symptoms of electrolyte imbalances  - Administer electrolyte replacement as ordered  - Monitor response to electrolyte replacements, including repeat lab results as appropriate  - Instruct patient on fluid and nutrition as appropriate  Outcome: Progressing  Goal: Fluid balance maintained  Description: INTERVENTIONS:  - Monitor labs   - Monitor I/O and WT  - Instruct patient on fluid and nutrition as appropriate  - Assess for signs & symptoms of volume excess or deficit  Outcome: Progressing  Goal: Glucose maintained within target range  Description: INTERVENTIONS:  - Monitor Blood Glucose as ordered  - Assess for signs and symptoms of hyperglycemia and hypoglycemia  - Administer ordered medications to maintain glucose within target range  - Assess nutritional intake and initiate nutrition service referral as needed  Outcome: Progressing     Problem: Nutrition/Hydration-ADULT  Goal: Nutrient/Hydration intake appropriate for improving, restoring or maintaining nutritional needs  Description: Monitor and assess patient's nutrition/hydration status for malnutrition  Collaborate with interdisciplinary team and initiate plan and interventions as ordered  Monitor patient's weight and dietary intake as ordered or per policy  Utilize nutrition screening tool and intervene as necessary  Determine patient's food preferences and provide high-protein, high-caloric foods as appropriate       INTERVENTIONS:  - Monitor oral intake, urinary output, labs, and treatment plans  - Assess nutrition and hydration status and recommend course of action  - Evaluate amount of meals eaten  - Assist patient with eating if necessary   - Allow adequate time for meals  - Recommend/ encourage appropriate diets, oral nutritional supplements, and vitamin/mineral supplements  - Order, calculate, and assess calorie counts as needed  - Recommend, monitor, and adjust tube feedings and TPN/PPN based on assessed needs  - Assess need for intravenous fluids  - Provide specific nutrition/hydration education as appropriate  - Include patient/family/caregiver in decisions related to nutrition  Outcome: Progressing

## 2022-08-28 NOTE — PROGRESS NOTES
[facility]  Progress Note Shanna Iglesias 1960, 65 y o  male MRN: [id number]  Unit/Bed#: 2 [place] [address] A Encounter: [id number]  Primary Care Provider: No primary care provider on file  Date and time admitted to hospital: 8/27/2022  1:02 PM    * Acute pancreatitis  Assessment & Plan  Presented with epigastric and right upper quadrant pain for 3 days associated with nausea in nonbloody nonbilious vomiting  Symptoms worsened by and followed by eating  · NPO  · IVF  · Monitor abdominal exam  · Trend LFT  · GI evaluation    Hepatic lesion with elevated LFTs  Assessment & Plan  · US RUQ reviewed  · CT a/p reviewed  · Trend LFT daily  · Hepatitis panel yet to be collected  · Check PT/INR, AFP  · GI evaluation    Abnormal urinalysis  Assessment & Plan  · UA noted to have 2+ protein, positive nitrite, negative leukocyte, trace ketone, moderate bilirubin, blood, 2-4 RBC, 0-1 WBC, moderate bacteria  · Patient denies any urinary complaints except frequency  Denies any hematuria or dysuria  · Monitor symptoms  No abx  Nicotine dependence  Assessment & Plan  Smokes about 1 pack a day  ·  Offered nicotine replacement therapy but patient declines    Alcohol abuse  Assessment & Plan  Drinks about 3-4 beers on daily basis, last drink was 8/25/22  · Discussed alcohol cessation, patient is not interested  · Thiamine, folic acid, multivitamin  · CIWA protocol        VTE Pharmacologic Prophylaxis: VTE Score: 1 Low Risk (Score 0-2) - Encourage Ambulation  Patient Centered Rounds: I performed bedside rounds with nursing staff today  Discussions with Specialists or Other Care Team Provider: gi    Education and Discussions with Family / Patient: Patient declined call to   Time Spent for Care: 30 minutes  More than 50% of total time spent on counseling and coordination of care as described above      Current Length of Stay: 1 day(s)  Current Patient Status: Inpatient   Certification Statement: The patient will continue to require additional inpatient hospital stay due to pending gi plan for hepatic lesion work up, pending diet advancement  Discharge Plan: Anticipate discharge in 24-48 hrs to home  Code Status: Level 1 - Full Code    Subjective:   Mild abd pain last night improved with morphine hasn't recurred  No n/v  Will try clears, he is requesting diet  Feels like he is full of fluids like is is "in the ocean"  No sob  He is asking when he can get home I educate him we will advance his treatment for the pancreatitis but we have to wait on the work up for the liver mass and he understands he wants to stay to have any work up done for the liver mass to find out what is the cause and what to do  Objective:     Vitals:   Temp (24hrs), Av 1 °F (37 3 °C), Min:98 5 °F (36 9 °C), Max:99 6 °F (37 6 °C)    Temp:  [98 5 °F (36 9 °C)-99 6 °F (37 6 °C)] 98 5 °F (36 9 °C)  HR:  [69-82] 74  Resp:  [18-20] 18  BP: (136-186)/() 160/86  SpO2:  [95 %-100 %] 97 %  Body mass index is 23 1 kg/m²  Input and Output Summary (last 24 hours): Intake/Output Summary (Last 24 hours) at 2022 0814  Last data filed at 2022 1646  Gross per 24 hour   Intake 1000 ml   Output --   Net 1000 ml       Physical Exam:   Physical Exam  Vitals and nursing note reviewed  Constitutional:       Appearance: Normal appearance  HENT:      Head: Normocephalic and atraumatic  Nose: Nose normal       Mouth/Throat:      Mouth: Mucous membranes are moist    Eyes:      Conjunctiva/sclera: Conjunctivae normal    Cardiovascular:      Rate and Rhythm: Normal rate and regular rhythm  Pulses: Normal pulses  Pulmonary:      Effort: Pulmonary effort is normal       Breath sounds: Normal breath sounds  Abdominal:      General: Bowel sounds are normal  There is no distension  Palpations: Abdomen is soft  Tenderness: There is no abdominal tenderness  There is no guarding     Musculoskeletal: General: Normal range of motion  Cervical back: Normal range of motion  Skin:     Coloration: Skin is jaundiced  Neurological:      General: No focal deficit present  Mental Status: He is alert     Psychiatric:         Mood and Affect: Mood normal       Comments: Pressured speech          Additional Data:     Labs:  Results from last 7 days   Lab Units 08/28/22  0525   WBC Thousand/uL 12 42*   HEMOGLOBIN g/dL 12 7   HEMATOCRIT % 38 3   PLATELETS Thousands/uL 132*   NEUTROS PCT % 58   LYMPHS PCT % 18   MONOS PCT % 14*   EOS PCT % 8*     Results from last 7 days   Lab Units 08/28/22  0525   SODIUM mmol/L 138   POTASSIUM mmol/L 4 2   CHLORIDE mmol/L 102   CO2 mmol/L 29   BUN mg/dL 11   CREATININE mg/dL 0 99   ANION GAP mmol/L 7   CALCIUM mg/dL 8 1*   ALBUMIN g/dL 2 8*   TOTAL BILIRUBIN mg/dL 2 48*   ALK PHOS U/L 90   ALT U/L 218*   AST U/L 164*   GLUCOSE RANDOM mg/dL 93     Results from last 7 days   Lab Units 08/28/22  0525   INR  1 22*                   Lines/Drains:  Invasive Devices  Report    Peripheral Intravenous Line  Duration           Peripheral IV 08/27/22 Left Antecubital <1 day                      Imaging: Reviewed radiology reports from this admission including: abdominal/pelvic CT    Recent Cultures (last 7 days):         Last 24 Hours Medication List:   Current Facility-Administered Medications   Medication Dose Route Frequency Provider Last Rate    acetaminophen  650 mg Oral Q6H PRN Ceasar Rubinstein, MD      folic acid  1 mg Oral Daily Ceasar Rubinstein, MD      lactated ringers  150 mL/hr Intravenous Continuous Ceasar Rubinstein,  mL/hr (08/28/22 0341)    morphine injection  2 mg Intravenous Q6H PRN Ceasar Rubinstein, MD      multivitamin-minerals  1 tablet Oral Daily Ceasar Rubinstein, MD      nicotine  1 patch Transdermal Daily Ceasar Rubinstein, MD      ondansetron  4 mg Intravenous Q6H PRN Ceasar Rubinstein, MD      pantoprazole  40 mg Intravenous Q24H [facility] Ceasar Rubinstein, MD  thiamine  100 mg Intravenous Daily Trevon Leija  mg (08/27/22 7044)        Today, Patient Was Seen By: Ronda Prater PA-C    **Please Note: This note may have been constructed using a voice recognition system  **

## 2022-08-28 NOTE — CONSULTS
Consultation - [facility]  [facility] Public 65 y o  male MRN: [id number]  Unit/Bed#: 2 [address] A Encounter: [id number]        Inpatient consult to gastroenterology  Consult performed by: Zamzam Winter PA-C  Consult ordered by: Blank You MD          Reason for Consult / Principal Problem:     ASSESSMENT and PLAN:      1  Acute on chronic pancreatitis  65year-old with significant daily alcohol use for the past 6 years presented with epigastric pain found to have elevated lipase of 4000  CT scan without acute pancreatitis however calcifications noted  Possible cholelithiasis on ultrasound  LFTs also elevated with a total bilirubin of 2 48 down trending to 1 89 today  Reports bowel movements are usually regular  Denies any recent unintentional weight loss  Denies prior known history of pancreatitis  Likely in the setting of alcohol use  -recommend continued aggressive fluid hydration  LR was recently discontinued  Recommend continuing at at least 150 mL an hour  Then can continue to decrease based on oral intake   -patient was adamant about advancing diet  Recommend only clear liquid diet for today  Possible advancement to low-fat tomorrow if doing well   -supportive care with pain medication antiemetics as needed   -continue to follow liver tests, CBC   -monitor abdominal exam    -discussed the importance of complete alcohol cessation  Patient initially joking around about this  The importance was discussed with him and he reports he would be more amenable to stopping   -recommend primary team providing resources to help with alcohol cessation     -if beginning to have postprandial diarrhea, consider pancreatic elastase with pancreatic enzyme supplementation in setting of chronic pancreatitis  -follow liver tests  -will be getting MRI for #2  Rule out choledocholithiasis at that time     -discussed with primary team    2   Hepatic lesion with elevated LFTs  Patient with new finding of 4 cm hepatic lesion noted suspicious for malignancy possibly primary or metastatic disease  Patient reports prior to this episode he was feeling well with no decreased appetite, unintentional weight loss or early satiety  He has chronic daily alcohol use for over 6 years  Patient also smokes a pack a day since the age of 16  No history of hepatitis  -obtain MRI with liver protocol for further evaluation of liver lesion   -if unable to diagnosis on this, patient will likely need liver biopsy   -will also rule out choledocholithiasis at that time due to elevated liver test and pancreatitis  -continue to follow liver enzymes  -follow-up chronic hepatitis panel    -again discussed the importance of complete alcohol cessation     -------------------------------------------------------------------------------------------------------------------    HPI:     Lauren Cristina is a 59-year-old male with past medical history of alcohol abuse who presented to the emergency room last night for epigastric pain  On arrival patient's vital signs were stable with blood pressure 178/103  Lipase was elevated initially at 4000, now 1988  LFTs elevated with total bilirubin of 2 48, AST of 164, ALT of 218 with normal alkaline phosphatase  Down trended this morning to total bilirubin of 1 98  White count elevated at 15 with normal hemoglobin  Thrombocytopenia of 132  CT scan with contrast noting 4 cm segment 8 hepatic lesions suspicious for malignancy possibly primary or metastatic disease  There is also a 13 mm maris hepatic node  Also evidence of chronic pancreatitis with calcification  Right upper quadrant ultrasound was then performed showing hepatic steatosis with mass in the right lobe of the liver gallbladder was contracted with thickened wall and cholelithiasis noted  No clear evidence of acute cholecystitis  Patient reports 4 days ago he ate food and immediately had an episode of emesis    This was followed by severe generalized abdominal cramping  He thought it was feeling better and ate a few bites of a cheeseburger and subsequently vomited this up as well  He reports prior to this he is feeling well  He reports his appetite was normal and his weight has been stable recently  Denies any recent unintentional weight loss  Denies any recent early satiety  He reports his bowel movements are on and off however no blood or melena noted  Patient reports daily alcohol use for at least 6 years  He reports increased amount recently  Currently drinking around 4-5 cans of beer a day  He also smokes a pack of cigarettes a day since the age of 16  Denies any history of hepatitis or IV drug use  Denies new medication  No prior abdominal surgeries  No known family history of GI malignancies  No prior EGD or colonoscopy      REVIEW OF SYSTEMS:    CONSTITUTIONAL: + improved decreased appetite  No unintentional weight loss or early satiety  HEENT: No earache or tinnitus  Denies hearing loss or visual disturbances  CARDIOVASCULAR: No chest pain or palpitations  RESPIRATORY: Denies any cough, hemoptysis, shortness of breath or dyspnea on exertion  GASTROINTESTINAL: As noted in the History of Present Illness  GENITOURINARY: No problems with urination  Denies any hematuria or dysuria  NEUROLOGIC: No dizziness or vertigo, denies headaches  MUSCULOSKELETAL: Denies any muscle or joint pain  SKIN: Denies skin rashes or itching  ENDOCRINE: Denies excessive thirst  Denies intolerance to heat or cold  PSYCHOSOCIAL: Denies depression or anxiety  Denies any recent memory loss  Historical Information   History reviewed  No pertinent past medical history  History reviewed  No pertinent surgical history  Social History   Social History     Substance and Sexual Activity   Alcohol Use Yes    Comment: Occ       Social History     Substance and Sexual Activity   Drug Use Never     Social History Tobacco Use   Smoking Status Current Every Day Smoker    Packs/day: 1 00   Smokeless Tobacco Not on file     History reviewed  No pertinent family history  Meds/Allergies     No medications prior to admission  Current Facility-Administered Medications   Medication Dose Route Frequency    acetaminophen (TYLENOL) tablet 650 mg  650 mg Oral N3L PRN    folic acid (FOLVITE) tablet 1 mg  1 mg Oral Daily    morphine injection 2 mg  2 mg Intravenous Q6H PRN    multivitamin-minerals (CENTRUM) tablet 1 tablet  1 tablet Oral Daily    nicotine (NICODERM CQ) 21 mg/24 hr TD 24 hr patch 1 patch  1 patch Transdermal Daily    ondansetron (ZOFRAN) injection 4 mg  4 mg Intravenous Q6H PRN    pantoprazole (PROTONIX) injection 40 mg  40 mg Intravenous Q24H ZULEYKA    thiamine (VITAMIN B1) 100 mg in sodium chloride 0 9 % 50 mL IVPB  100 mg Intravenous Daily       No Known Allergies        Objective     Blood pressure 160/86, pulse 74, temperature 98 5 °F (36 9 °C), temperature source Oral, resp  rate 18, height 5' 7" (1 702 m), weight 66 9 kg (147 lb 7 8 oz), SpO2 97 %  Intake/Output Summary (Last 24 hours) at 8/28/2022 1147  Last data filed at 8/28/2022 0601  Gross per 24 hour   Intake 2560 ml   Output --   Net 2560 ml         PHYSICAL EXAM:      General Appearance:   Alert, cooperative  Sitting comfortably in bed  Does not appear in distress   HEENT:   Normocephalic, atraumatic, anicteric  Neck:  Supple, symmetrical, trachea midline, no adenopathy  Lungs:   Clear to auscultation bilaterally; no rales, rhonchi or wheezing; respirations unlabored    Heart[de-identified]   S1 and S2 normal; regular rate and rhythm; no murmur, rub, or gallop  Abdomen:   + patient denies any tenderness to palpation throughout abdomen    Soft, nondistended      Genitalia:   Deferred    Rectal:   Deferred    Extremities:  No cyanosis, clubbing or edema    Pulses:  2+ and symmetric all extremities    Skin:  Skin color, texture, turgor normal, no rashes or lesions    Lymph nodes:  No palpable cervical, axillary or inguinal lymphadenopathy        Lab Results:   Results from last 7 days   Lab Units 08/28/22  0525   WBC Thousand/uL 12 42*   HEMOGLOBIN g/dL 12 7   HEMATOCRIT % 38 3   PLATELETS Thousands/uL 132*   NEUTROS PCT % 58   LYMPHS PCT % 18   MONOS PCT % 14*   EOS PCT % 8*     Results from last 7 days   Lab Units 08/28/22  0525   POTASSIUM mmol/L 4 2   CHLORIDE mmol/L 102   CO2 mmol/L 29   BUN mg/dL 11   CREATININE mg/dL 0 99   CALCIUM mg/dL 8 1*   ALK PHOS U/L 90   ALT U/L 218*   AST U/L 164*     Results from last 7 days   Lab Units 08/28/22  0525   INR  1 22*     Results from last 7 days   Lab Units 08/28/22  0525   LIPASE u/L 1,988*       Imaging Studies: I have personally reviewed pertinent imaging studies  US right upper quadrant    Result Date: 8/27/2022  Impression: 1  Hepatic steatosis with the mass in the right lobe of the liver  Additional evaluation will be necessary to determine the nature of the mass  Jeana hepatis and perihepatic lymph node enlargement  Malignancy is a diagnosis of exclusion  Evaluation with contrast-enhanced CT or MRI abdomen should be obtained  2   Contracted gallbladder with thickened wall and echogenic foci within it  Findings are not typical for acute cholecystitis  Gallbladder could also be better evaluated on the follow-up CT or MRI exam  The study was marked in EPIC for immediate notification  Workstation performed: [id number]     CT abdomen pelvis with contrast    Result Date: 8/27/2022  Impression: 4 cm segment 8 hepatic lesion suspicious for malignancy, likely primary malignancy or metastatic disease  13 mm jeana hepatic node  The study was marked in [facility] for immediate notification  Workstation performed: [id number]           Patient was seen and examined by Dr Brigida Dakins  All dean medical decisions were made by Dr Brigida Dakins  Thank you for allowing us to participate in the care of this present patient   We will follow-up with you closely

## 2022-08-29 ENCOUNTER — APPOINTMENT (OUTPATIENT)
Dept: RADIOLOGY | Facility: HOSPITAL | Age: 62
DRG: 282 | End: 2022-08-29
Payer: COMMERCIAL

## 2022-08-29 PROBLEM — B19.20 HEPATITIS C: Status: ACTIVE | Noted: 2022-08-29

## 2022-08-29 PROBLEM — R82.90 ABNORMAL URINALYSIS: Status: RESOLVED | Noted: 2022-08-27 | Resolved: 2022-08-29

## 2022-08-29 LAB
ALBUMIN SERPL BCP-MCNC: 2.8 G/DL (ref 3.5–5)
ALP SERPL-CCNC: 87 U/L (ref 46–116)
ALT SERPL W P-5'-P-CCNC: 200 U/L (ref 12–78)
ANION GAP SERPL CALCULATED.3IONS-SCNC: 9 MMOL/L (ref 4–13)
APTT PPP: 30 SECONDS (ref 23–37)
AST SERPL W P-5'-P-CCNC: 154 U/L (ref 5–45)
ATRIAL RATE: 87 BPM
BILIRUB SERPL-MCNC: 1.54 MG/DL (ref 0.2–1)
BUN SERPL-MCNC: 13 MG/DL (ref 5–25)
CALCIUM ALBUM COR SERPL-MCNC: 9.3 MG/DL (ref 8.3–10.1)
CALCIUM SERPL-MCNC: 8.3 MG/DL (ref 8.3–10.1)
CHLORIDE SERPL-SCNC: 103 MMOL/L (ref 96–108)
CO2 SERPL-SCNC: 28 MMOL/L (ref 21–32)
CREAT SERPL-MCNC: 0.93 MG/DL (ref 0.6–1.3)
ERYTHROCYTE [DISTWIDTH] IN BLOOD BY AUTOMATED COUNT: 12.9 % (ref 11.6–15.1)
GFR SERPL CREATININE-BSD FRML MDRD: 88 ML/MIN/1.73SQ M
GLUCOSE SERPL-MCNC: 100 MG/DL (ref 65–140)
HBV CORE AB SER QL: ABNORMAL
HBV CORE IGM SER QL: ABNORMAL
HBV SURFACE AG SER QL: ABNORMAL
HCT VFR BLD AUTO: 35.5 % (ref 36.5–49.3)
HCV AB SER QL: ABNORMAL
HGB BLD-MCNC: 11.5 G/DL (ref 12–17)
INR PPP: 1.24 (ref 0.84–1.19)
MCH RBC QN AUTO: 31.4 PG (ref 26.8–34.3)
MCHC RBC AUTO-ENTMCNC: 32.4 G/DL (ref 31.4–37.4)
MCV RBC AUTO: 97 FL (ref 82–98)
P AXIS: 48 DEGREES
PLATELET # BLD AUTO: 116 THOUSANDS/UL (ref 149–390)
PMV BLD AUTO: 11.5 FL (ref 8.9–12.7)
POTASSIUM SERPL-SCNC: 3.6 MMOL/L (ref 3.5–5.3)
PR INTERVAL: 134 MS
PROT SERPL-MCNC: 6.7 G/DL (ref 6.4–8.4)
PROTHROMBIN TIME: 15.7 SECONDS (ref 11.6–14.5)
QRS AXIS: 81 DEGREES
QRSD INTERVAL: 82 MS
QT INTERVAL: 400 MS
QTC INTERVAL: 481 MS
RBC # BLD AUTO: 3.66 MILLION/UL (ref 3.88–5.62)
SODIUM SERPL-SCNC: 140 MMOL/L (ref 135–147)
T WAVE AXIS: 72 DEGREES
VENTRICULAR RATE: 87 BPM
WBC # BLD AUTO: 7.89 THOUSAND/UL (ref 4.31–10.16)

## 2022-08-29 PROCEDURE — 86704 HEP B CORE ANTIBODY TOTAL: CPT | Performed by: INTERNAL MEDICINE

## 2022-08-29 PROCEDURE — 85027 COMPLETE CBC AUTOMATED: CPT | Performed by: PHYSICIAN ASSISTANT

## 2022-08-29 PROCEDURE — G1004 CDSM NDSC: HCPCS

## 2022-08-29 PROCEDURE — 86705 HEP B CORE ANTIBODY IGM: CPT | Performed by: INTERNAL MEDICINE

## 2022-08-29 PROCEDURE — 99232 SBSQ HOSP IP/OBS MODERATE 35: CPT | Performed by: NURSE PRACTITIONER

## 2022-08-29 PROCEDURE — 87340 HEPATITIS B SURFACE AG IA: CPT | Performed by: INTERNAL MEDICINE

## 2022-08-29 PROCEDURE — 85730 THROMBOPLASTIN TIME PARTIAL: CPT | Performed by: PHYSICIAN ASSISTANT

## 2022-08-29 PROCEDURE — 99232 SBSQ HOSP IP/OBS MODERATE 35: CPT | Performed by: INTERNAL MEDICINE

## 2022-08-29 PROCEDURE — A9585 GADOBUTROL INJECTION: HCPCS | Performed by: PHYSICIAN ASSISTANT

## 2022-08-29 PROCEDURE — 80053 COMPREHEN METABOLIC PANEL: CPT | Performed by: PHYSICIAN ASSISTANT

## 2022-08-29 PROCEDURE — 86803 HEPATITIS C AB TEST: CPT | Performed by: INTERNAL MEDICINE

## 2022-08-29 PROCEDURE — 85610 PROTHROMBIN TIME: CPT | Performed by: PHYSICIAN ASSISTANT

## 2022-08-29 PROCEDURE — 74183 MRI ABD W/O CNTR FLWD CNTR: CPT

## 2022-08-29 PROCEDURE — 93010 ELECTROCARDIOGRAM REPORT: CPT | Performed by: INTERNAL MEDICINE

## 2022-08-29 RX ORDER — POTASSIUM CHLORIDE 20 MEQ/1
40 TABLET, EXTENDED RELEASE ORAL ONCE
Status: COMPLETED | OUTPATIENT
Start: 2022-08-29 | End: 2022-08-29

## 2022-08-29 RX ORDER — CHLORDIAZEPOXIDE HYDROCHLORIDE 10 MG/1
10 CAPSULE, GELATIN COATED ORAL EVERY 8 HOURS SCHEDULED
Status: DISCONTINUED | OUTPATIENT
Start: 2022-08-29 | End: 2022-08-30

## 2022-08-29 RX ORDER — LORAZEPAM 0.5 MG/1
0.5 TABLET ORAL EVERY 8 HOURS PRN
Status: DISCONTINUED | OUTPATIENT
Start: 2022-08-29 | End: 2022-08-31 | Stop reason: HOSPADM

## 2022-08-29 RX ADMIN — PANTOPRAZOLE SODIUM 40 MG: 40 TABLET, DELAYED RELEASE ORAL at 06:15

## 2022-08-29 RX ADMIN — NICOTINE 1 PATCH: 21 PATCH, EXTENDED RELEASE TRANSDERMAL at 11:24

## 2022-08-29 RX ADMIN — POTASSIUM CHLORIDE 40 MEQ: 1500 TABLET, EXTENDED RELEASE ORAL at 11:24

## 2022-08-29 RX ADMIN — FOLIC ACID 1 MG: 1 TABLET ORAL at 11:24

## 2022-08-29 RX ADMIN — CHLORDIAZEPOXIDE HYDROCHLORIDE 10 MG: 10 CAPSULE ORAL at 11:23

## 2022-08-29 RX ADMIN — THIAMINE HCL TAB 100 MG 100 MG: 100 TAB at 11:23

## 2022-08-29 RX ADMIN — CHLORDIAZEPOXIDE HYDROCHLORIDE 10 MG: 10 CAPSULE ORAL at 21:06

## 2022-08-29 RX ADMIN — GADOBUTROL 7 ML: 604.72 INJECTION INTRAVENOUS at 11:05

## 2022-08-29 RX ADMIN — Medication 1 TABLET: at 11:23

## 2022-08-29 NOTE — ASSESSMENT & PLAN NOTE
Presented with epigastric and right upper quadrant pain for 3 days associated with nausea in nonbloody nonbilious vomiting  Symptoms worsened by and followed by eating  · Advanced to low fat diet   · Off IVF   Encourage oral hydration   · Supportive care with antiemetics  · Monitor abdominal exam  · Trend LFT  · Encourage alcohol and nicotine cessation  · GI following, appreciate input   · Follow-up MRI abdomen due to transaminitis and pancreatitis to rule out choledocholithiasis

## 2022-08-29 NOTE — PROGRESS NOTES
[address]  Progress Note Cheikh Armstrong 1960, 65 y o  male MRN: [id number]  Unit/Bed#: [address] [place] [address] A Encounter: [id number]  Primary Care Provider: No primary care provider on file  Date and time admitted to hospital: 8/27/2022  1:02 PM    * Acute pancreatitis  Assessment & Plan  Presented with epigastric and right upper quadrant pain for 3 days associated with nausea in nonbloody nonbilious vomiting  Symptoms worsened by and followed by eating  · Advanced to low fat diet   · Off IVF  Encourage oral hydration   · Supportive care with antiemetics  · Monitor abdominal exam  · Trend LFT  · Encourage alcohol and nicotine cessation  · GI following, appreciate input   · Follow-up MRI abdomen due to transaminitis and pancreatitis to rule out choledocholithiasis     Hepatic lesion with elevated LFTs  Assessment & Plan  · US RUQ reviewed  · CT A/P: "4 cm segment 8 hepatic lesion suspicious for malignancy, likely primary malignancy or metastatic disease "  · Follow-up MRCP  · Hepatitis panel positive for Hep C  Follow-up Hepatitis C genotype and viral load   · Trend LFT daily  · Appreciate GI input    Hepatitis C  Assessment & Plan  Hepatitis panel positive for Hep C  · Unclear if this is acute or chronic  · Follow-up Hep C genotype and viral load  · Appreciate GI input     Nicotine dependence  Assessment & Plan  Smokes about 1 pack a day    · Offered nicotine replacement therapy but patient declines  · RN noted patient smoking in the bathroom overnight on 8/28 - patient stated his brother brought him a cigarette     Alcohol abuse  Assessment & Plan  Drinks about 3-4 beers on daily basis, last drink was 8/25/22  · Discussed alcohol cessation, patient now amenable   · Thiamine, folic acid, multivitamin  · [facility] protocol    Abnormal urinalysis-resolved as of 8/29/2022  Assessment & Plan  · UA noted to have 2+ protein, positive nitrite, negative leukocyte, trace ketone, moderate bilirubin, blood, 2-4 RBC, 0-1 WBC, moderate bacteria  · Patient denies any urinary complaints except frequency  Denies any hematuria or dysuria  · Urine culture with no growth   · No need for treatment       VTE Pharmacologic Prophylaxis: VTE Score: 1 High Risk (Score >/= 5) - Pharmacological DVT Prophylaxis Contraindicated  Sequential Compression Devices Ordered  Patient Centered Rounds: I performed bedside rounds with nursing staff today  Discussions with Specialists or Other Care Team Provider: nursing, CM, GI    Education and Discussions with Family / Patient: Attempted to update  (brother) via phone  Unable to contact  Time Spent for Care: 30 minutes  More than 50% of total time spent on counseling and coordination of care as described above  Current Length of Stay: 2 day(s)  Current Patient Status: Inpatient   Certification Statement: The patient will continue to require additional inpatient hospital stay due to hepatic lesion and Hep C requiring further workup   Discharge Plan: Anticipate discharge in 24-48 hrs to home  Code Status: Level 1 - Full Code    Subjective:   Patient seen and examined at bedside  Reports improvement in abdominal pain  Desires discharge home soon but does not want to leave before his test results  Denies CP or SOB  Objective:     Vitals:   Temp (24hrs), Av 3 °F (36 8 °C), Min:98 °F (36 7 °C), Max:98 7 °F (37 1 °C)    Temp:  [98 °F (36 7 °C)-98 7 °F (37 1 °C)] 98 °F (36 7 °C)  HR:  [60-84] 60  Resp:  [17-20] 18  BP: (144-154)/(73-86) 152/82  SpO2:  [96 %-99 %] 97 %  Body mass index is 23 49 kg/m²  Input and Output Summary (last 24 hours): Intake/Output Summary (Last 24 hours) at 2022 1421  Last data filed at 2022 1524  Gross per 24 hour   Intake 510 ml   Output --   Net 510 ml       Physical Exam:   Physical Exam  Vitals and nursing note reviewed  Constitutional:       General: He is not in acute distress       Appearance: He is not toxic-appearing or diaphoretic  Comments: Pleasant, talkative gentleman resting in bed   HENT:      Head: Normocephalic  Mouth/Throat:      Mouth: Mucous membranes are moist    Eyes:      Conjunctiva/sclera: Conjunctivae normal    Cardiovascular:      Rate and Rhythm: Normal rate  Pulmonary:      Effort: Pulmonary effort is normal       Breath sounds: Normal breath sounds  No wheezing, rhonchi or rales  Abdominal:      General: Bowel sounds are normal       Palpations: Abdomen is soft  Musculoskeletal:         General: Normal range of motion  Cervical back: Normal range of motion  Right lower leg: No edema  Left lower leg: No edema  Skin:     General: Skin is warm and dry  Capillary Refill: Capillary refill takes less than 2 seconds  Coloration: Skin is jaundiced (mild )  Neurological:      Mental Status: He is alert and oriented to person, place, and time  Mental status is at baseline  Psychiatric:         Mood and Affect: Mood normal          Speech: Speech is rapid and pressured  Behavior: Behavior normal          Thought Content:  Thought content normal           Additional Data:     Labs:  Results from last 7 days   Lab Units 08/29/22  0436 08/28/22  0525   WBC Thousand/uL 7 89 12 42*   HEMOGLOBIN g/dL 11 5* 12 7   HEMATOCRIT % 35 5* 38 3   PLATELETS Thousands/uL 116* 132*   NEUTROS PCT %  --  58   LYMPHS PCT %  --  18   MONOS PCT %  --  14*   EOS PCT %  --  8*     Results from last 7 days   Lab Units 08/29/22  0436   SODIUM mmol/L 140   POTASSIUM mmol/L 3 6   CHLORIDE mmol/L 103   CO2 mmol/L 28   BUN mg/dL 13   CREATININE mg/dL 0 93   ANION GAP mmol/L 9   CALCIUM mg/dL 8 3   ALBUMIN g/dL 2 8*   TOTAL BILIRUBIN mg/dL 1 54*   ALK PHOS U/L 87   ALT U/L 200*   AST U/L 154*   GLUCOSE RANDOM mg/dL 100     Results from last 7 days   Lab Units 08/29/22  0436   INR  1 24*                   Lines/Drains:  Invasive Devices  Report    Peripheral Intravenous Line  Duration Peripheral IV 08/27/22 Left Antecubital 2 days                      Imaging: Reviewed radiology reports from this admission including: abdominal/pelvic CT and ultrasound(s)    Recent Cultures (last 7 days):   Results from last 7 days   Lab Units 08/27/22  1434   URINE CULTURE  No Growth <1000 cfu/mL       Last 24 Hours Medication List:   Current Facility-Administered Medications   Medication Dose Route Frequency Provider Last Rate    acetaminophen  650 mg Oral Q6H PRN Trinidad Boucher MD      chlordiazePOXIDE  10 mg Oral [facility] DAGOBERTO Mullen      folic acid  1 mg Oral Daily Trinidad Boucher MD      LORazepam  0 5 mg Oral Q8H PRN DAGOBERTO Mullen      morphine injection  1 mg Intravenous Q6H PRN Zahraa Rivera PA-C      multivitamin-minerals  1 tablet Oral Daily Trinidad Boucher MD      nicotine  1 patch Transdermal Daily Trinidad Boucher MD      ondansetron  4 mg Intravenous Q6H PRN Trinidad Boucher MD      oxyCODONE  2 5 mg Oral Q6H PRN Zahraa Rivera PA-C      pantoprazole  40 mg Oral Early Morning Zahraa Rivera PA-C      thiamine  100 mg Oral Daily Batsheva Godfrey PA-C          Today, Patient Was Seen By: DAGOBERTO Mullen    **Please Note: This note may have been constructed using a voice recognition system  **

## 2022-08-29 NOTE — ASSESSMENT & PLAN NOTE
Drinks about 3-4 beers on daily basis, last drink was 8/25/22  · Discussed alcohol cessation, patient now amenable   · Thiamine, folic acid, multivitamin  · CIWA protocol

## 2022-08-29 NOTE — PLAN OF CARE
Problem: GASTROINTESTINAL - ADULT  Goal: Minimal or absence of nausea and/or vomiting  Description: INTERVENTIONS:  - Administer IV fluids if ordered to ensure adequate hydration  - Administer ordered antiemetic medications as needed  - Provide nonpharmacologic comfort measures as appropriate  - Advance diet as tolerated, if ordered  - Consider nutrition services referral to assist patient with adequate nutrition and appropriate food choices  Outcome: Progressing     Problem: GASTROINTESTINAL - ADULT  Goal: Maintains or returns to baseline bowel function  Description: INTERVENTIONS:  - Assess bowel function  - Encourage oral fluids to ensure adequate hydration  - Administer IV fluids if ordered to ensure adequate hydration  - Administer ordered medications as needed  - Encourage mobilization and activity  - Consider nutritional services referral to assist patient with adequate nutrition and appropriate food choices  Outcome: Progressing     Problem: METABOLIC, FLUID AND ELECTROLYTES - ADULT  Goal: Fluid balance maintained  Description: INTERVENTIONS:  - Monitor labs   - Monitor I/O and WT  - Instruct patient on fluid and nutrition as appropriate  - Assess for signs & symptoms of volume excess or deficit  Outcome: Progressing

## 2022-08-29 NOTE — ASSESSMENT & PLAN NOTE
· US RUQ reviewed  · CT A/P: "4 cm segment 8 hepatic lesion suspicious for malignancy, likely primary malignancy or metastatic disease "  · Follow-up MRCP  · Hepatitis panel positive for Hep C   Follow-up Hepatitis C genotype and viral load   · Trend LFT daily  · Appreciate GI input

## 2022-08-29 NOTE — ASSESSMENT & PLAN NOTE
· UA noted to have 2+ protein, positive nitrite, negative leukocyte, trace ketone, moderate bilirubin, blood, 2-4 RBC, 0-1 WBC, moderate bacteria  · Patient denies any urinary complaints except frequency  Denies any hematuria or dysuria    · Urine culture with no growth   · No need for treatment

## 2022-08-29 NOTE — NURSING NOTE
Upon entering room, noted pt's bathroom reeked of cigarette smoke  Questioned pt  as to where the cigarettes were so as to be confiscated  Pt adamantly denied that he not only was smoking, but that he had a lighter or cigarettes in his possession  Explained to pt the seriousness of smoking in a room with a roommate who is on O2  Pt continued to deny he was smoking  I explained to pt that his belongings need to be searched now  Pt   explained that he did in fact smoke and his brother gave him a cigarette when he came in for a visit earlier yesterday  Lighter was confiscated and placed in med room and will be returned upon discharge

## 2022-08-29 NOTE — ASSESSMENT & PLAN NOTE
Hepatitis panel positive for Hep C  · Unclear if this is acute or chronic  · Follow-up Hep C genotype and viral load  · Appreciate GI input

## 2022-08-29 NOTE — CASE MANAGEMENT
Case Management Assessment & Discharge Planning Note    Patient name Keara Willis  Location [address] A MRN [id number]  : 1960 Date 2022       Current Admission Date: 2022  Current Admission Diagnosis:Acute pancreatitis   Patient Active Problem List    Diagnosis Date Noted    Hepatitis C 2022    Acute pancreatitis 2022    Hepatic lesion with elevated LFTs 2022    Alcohol abuse 2022    Nicotine dependence 2022      LOS (days): 2  Geometric Mean LOS (GMLOS) (days):   Days to GMLOS:     OBJECTIVE:    Risk of Unplanned Readmission Score: 9 44      Current admission status: Inpatient     Preferred Pharmacy:   [address]  Phone: [phone] Fax: [phone]    Primary Care Provider: No primary care provider on file  Primary Insurance: HEALTHCARE ASSISTANCE PENDING  Secondary Insurance:     ASSESSMENT:  Active Health Care Proxies    There are no active Health Care Proxies on file      Readmission Root Cause  30 Day Readmission: No    Patient Information  Admitted from[de-identified] Home  Mental Status: Alert  During Assessment patient was accompanied by: Not accompanied during assessment  Assessment information provided by[de-identified] Patient  Primary Caregiver: Self  Support Systems: Self, Family members  [place] of Residence: [address] [place] [address] do you live in?: [place], [place]  Type of Current Residence: Other (Comment) ([address] [place] [address] House-rented room)  In the last 12 months, was there a time when you were not able to pay the mortgage or rent on time?: No  In the last 12 months, how many places have you lived?: 1  In the last 12 months, was there a time when you did not have a steady place to sleep or slept in a shelter (including now)?: No  Homeless/housing insecurity resource given?: N/A  Living Arrangements: Lives Alone  Is patient a ?: No    Activities of Daily Living Prior to Admission  Functional Status: Independent  Completes ADLs independently?: Yes  Ambulates independently?: Yes  Does patient use assisted devices?: No  Does patient currently own DME?: No  Does patient have a history of Outpatient Therapy (PT/OT)?: No  Does the patient have a history of Short-Term Rehab?: No  Does patient have a history of HHC?: No  [facility]?: No     Patient Information Continued  Income Source: Unemployed  Does patient have prescription coverage?: No  Within the past 12 months, you worried that your food would run out before you got the money to buy more : Never true  Within the past 12 months, the food you bought just didn't last and you didn't have money to get more : Never true  Food insecurity resource given?: N/A  Does patient receive dialysis treatments?: No  Does patient have a history of substance abuse?: Yes  Historical substance use preference: Alcohol/ETOH (Smokes 1ppd cigarettes; drinks 4-5 beers/day)  Is patient currently in treatment for substance abuse?: No  Patient declined treatment information    Does patient have a history of Mental Health Diagnosis?: No     Means of Transportation  Means of Transport to Appts[de-identified] Family transport  In the past 12 months, has lack of transportation kept you from medical appointments or from getting medications?: No  In the past 12 months, has lack of transportation kept you from meetings, work, or from getting things needed for daily living?: No  Was application for public transport provided?: N/A    DISCHARGE DETAILS:    Discharge planning discussed with[de-identified] Patient     CM contacted family/caregiver?: Yes  Were Treatment Team discharge recommendations reviewed with patient/caregiver?: Yes  Did patient/caregiver verbalize understanding of patient care needs?: Yes  Were patient/caregiver advised of the risks associated with not following Treatment Team discharge recommendations?: Yes    Contacts  Patient Contacts: Fransico Perkins Koko Wood (brother)  Relationship to Patient[de-identified] Family  Contact Method: Phone  Phone Number: [phone]  Reason/Outcome: Emergency [address]         Is the patient interested in Jose Zamarripa at discharge?: No    DME Referral Provided  Referral made for DME?: No    Other Referral/Resources/Interventions Provided:  Financial Resources Provided: Financial Counselor  Interventions: PCP  Referral Comments: SW confirmed with financial counselor Eliezer Mason  that Medicaid application is pending  Pt was provided with information for [facility] or [address] [facility] for primary care follow-up  Both practices will accept patient Medicaid pending  Would you like to participate in our [address] service program?  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 but had to reschedule due to hospitalization  Financial counselor Eliezer Mason  Has spoken with patient to begin [place] application  SW advised of importance with OP f/u and establishment with PCP  Patient provided with information for the [address] and [facility] as locations that he can go to while uninsured  Patient was grateful for information but noted that he will likely not go to an appointment until he is insured  SW explained sliding scale and likelihood that he will have small to no copay for appts; however, pt still stated he wanted to get job/insured first  Information written on AVS      SW will f/u with Attending regarding any discharge medications to determine OOP cost and pt's ability to afford   Pt confirmed either his brother or friend will transport him home

## 2022-08-29 NOTE — ASSESSMENT & PLAN NOTE
Smokes about 1 pack a day    · Offered nicotine replacement therapy but patient declines  · RN noted patient smoking in the bathroom overnight on 8/28 - patient stated his brother brought him a cigarette

## 2022-08-29 NOTE — PROGRESS NOTES
[place] - [facility]  Gage Carl 65 y o  male MRN: [id number]    Unit/Bed#: [facility] Encounter: [id number]      Assessment/Plan:    1  Acute on chronic pancreatitis  65year-old with significant daily alcohol use for the past 6 years presented with epigastric pain found to have elevated lipase of 4000  CT scan without acute pancreatitis however calcifications noted in keeping with chronic pancreatitis  Possible cholelithiasis on ultrasound  LFTs also elevated with a total bilirubin of 2 48 down trending to 1 54 today  BUN and creatinine within normal limits  Reports bowel movements are usually regular  Denies any recent unintentional weight loss  Denies prior known history of pancreatitis  Chronic pancreatitis most likely due to alcohol use       -Low fat diet as tolerated  -supportive care with pain medication and antiemetics as needed  -continue to follow liver tests, CBC   -monitor abdominal exam   -discussed the importance of complete alcohol cessation  Explained to patient consequences of continued alcohol use in relationship to pancreatitis  Patient reported he will stop consuming alcohol   -recommend primary team providing resources to help with alcohol cessation   - MRI due to elevated liver enzymes and pancreatitis to rule out choledocholithiasis      2  Hepatic lesion with elevated LFTs  Patient with new finding of 4 cm hepatic lesion noted suspicious for malignancy possibly primary or metastatic disease  Patient reports prior to this episode he was feeling well with no decreased appetite, unintentional weight loss or early satiety  He has chronic daily alcohol use for over 6 years  Patient also smokes a pack a day since the age of 16  No history of hepatitis        -obtain MRI with liver protocol for further evaluation of liver lesion    -patient may need liver biopsy of unable to determine if lesion is malignancy from MRI   -continue to follow liver enzymes  -hepatitis panel positive for hepatitis C  Will obtain Hepatitis C genotype and viral load to see if patient has active disease      Subjective:   Lying in bed in no acute  Tolerating diet  Denies nausea, vomiting, acid reflux, heartburn, epigastric or abdominal pain  Patient denies diarrhea  Objective:     Vitals: Blood pressure 152/82, pulse 60, temperature 98 °F (36 7 °C), resp  rate 18, height 5' 7" (1 702 m), weight 68 kg (150 lb), SpO2 97 %  ,Body mass index is 23 49 kg/m²  Intake/Output Summary (Last 24 hours) at 8/29/2022 1209  Last data filed at 8/28/2022 1524  Gross per 24 hour   Intake 510 ml   Output --   Net 510 ml       Physical Exam: Physical Exam  Vitals and nursing note reviewed  Constitutional:       General: He is not in acute distress  Cardiovascular:      Rate and Rhythm: Normal rate and regular rhythm  Pulses: Normal pulses  Heart sounds: Normal heart sounds  Pulmonary:      Effort: Pulmonary effort is normal  No respiratory distress  Breath sounds: Normal breath sounds  No stridor  No wheezing, rhonchi or rales  Abdominal:      General: Bowel sounds are normal  There is no distension  Palpations: Abdomen is soft  There is no mass  Tenderness: There is no abdominal tenderness  There is no guarding or rebound  Hernia: No hernia is present  Musculoskeletal:      Right lower leg: No edema  Left lower leg: No edema  Skin:     General: Skin is warm and dry  Capillary Refill: Capillary refill takes less than 2 seconds  Coloration: Skin is not jaundiced or pale  Neurological:      Mental Status: He is alert and oriented to person, place, and time     Psychiatric:         Mood and Affect: Mood normal          Invasive Devices  Report    Peripheral Intravenous Line  Duration           Peripheral IV 08/27/22 Left Antecubital 1 day                Current Facility-Administered Medications:     acetaminophen (TYLENOL) tablet 650 mg, 650 mg, Oral, Q6H PRN, Jimmie Magdi Coronel MD    chlordiazePOXIDE (LIBRIUM) capsule 10 mg, 10 mg, Oral, Q8H [address], DAGOBERTO Stuart, 10 mg at 71/77/44 4918    folic acid (FOLVITE) tablet 1 mg, 1 mg, Oral, Daily, April MD Anusha, 1 mg at 08/29/22 1124    LORazepam (ATIVAN) tablet 0 5 mg, 0 5 mg, Oral, Q8H PRN, DAGOBERTO Stuart    morphine injection 1 mg, 1 mg, Intravenous, Q6H PRN, Zahraa Rivera PA-C    multivitamin-minerals (CENTRUM) tablet 1 tablet, 1 tablet, Oral, Daily, April MD Anusha, 1 tablet at 08/29/22 1123    nicotine (NICODERM CQ) 21 mg/24 hr TD 24 hr patch 1 patch, 1 patch, Transdermal, Daily, April MD Anusha, 1 patch at 08/29/22 1124    ondansetron (ZOFRAN) injection 4 mg, 4 mg, Intravenous, Q6H PRN, April MD Anusha    oxyCODONE (ROXICODONE) IR tablet 2 5 mg, 2 5 mg, Oral, Q6H PRN, Zahraa Rivera PA-C    pantoprazole (PROTONIX) EC tablet 40 mg, 40 mg, Oral, Early Morning, Zahraa Rivera PA-C, 40 mg at 08/29/22 0615    thiamine tablet 100 mg, 100 mg, Oral, Daily, Zahraa Rivera PA-C, 100 mg at 08/29/22 1123    Lab Results:   Recent Results (from the past 24 hour(s))   Chronic Hepatitis Panel    Collection Time: 08/29/22  4:36 AM   Result Value Ref Range    Hepatitis B Surface Ag Non-reactive Non-reactive, NonReactive - Confirmed    Hepatitis C Ab High Reactive (A) Non-reactive    Hep B C IgM Non-reactive Non-reactive    Hep B Core Total Ab Non-reactive Non-reactive   Comprehensive metabolic panel    Collection Time: 08/29/22  4:36 AM   Result Value Ref Range    Sodium 140 135 - 147 mmol/L    Potassium 3 6 3 5 - 5 3 mmol/L    Chloride 103 96 - 108 mmol/L    CO2 28 21 - 32 mmol/L    ANION GAP 9 4 - 13 mmol/L    BUN 13 5 - 25 mg/dL    Creatinine 0 93 0 60 - 1 30 mg/dL    Glucose 100 65 - 140 mg/dL    Calcium 8 3 8 3 - 10 1 mg/dL    Corrected Calcium 9 3 8 3 - 10 1 mg/dL     (H) 5 - 45 U/L     (H) 12 - 78 U/L    Alkaline Phosphatase 87 46 - 116 U/L    Total Protein 6 7 6 4 - 8 4 g/dL    Albumin 2 8 (L) 3 5 - 5 0 g/dL    Total Bilirubin 1 54 (H) 0 20 - 1 00 mg/dL    eGFR 88 ml/min/1 73sq m   CBC    Collection Time: 08/29/22  4:36 AM   Result Value Ref Range    WBC 7 89 4 31 - 10 16 Thousand/uL    RBC 3 66 (L) 3 88 - 5 62 Million/uL    Hemoglobin 11 5 (L) 12 0 - 17 0 g/dL    Hematocrit 35 5 (L) 36 5 - 49 3 %    MCV 97 82 - 98 fL    MCH 31 4 26 8 - 34 3 pg    MCHC 32 4 31 4 - 37 4 g/dL    RDW 12 9 11 6 - 15 1 %    Platelets 145 (L) 713 - 390 Thousands/uL    MPV 11 5 8 9 - 12 7 fL   Protime-INR    Collection Time: 08/29/22  4:36 AM   Result Value Ref Range    Protime 15 7 (H) 11 6 - 14 5 seconds    INR 1 24 (H) 0 84 - 1 19   APTT    Collection Time: 08/29/22  4:36 AM   Result Value Ref Range    PTT 30 23 - 37 seconds             Imaging Studies: US right upper quadrant    Result Date: 8/27/2022  Narrative: RIGHT UPPER QUADRANT ULTRASOUND INDICATION:     epigastric pain, vomiting after eating beginning thursday  COMPARISON:  None TECHNIQUE:   Real-time ultrasound of the right upper quadrant was performed with a curvilinear transducer with both volumetric sweeps and still imaging techniques  FINDINGS: PANCREAS:  Portions of the pancreas are obscured by bowel gas  Visualized portions of the pancreas are unremarkable  AORTA AND IVC:  Visualized portions are normal for patient age  LIVER: Size:  Within normal range  The liver measures 16 3 cm in the midclavicular line  Contour:  Surface contour is smooth  Parenchyma: There is moderate diffuse increased echogenicity with smooth echotexture and acoustic beam attenuation  Most consistent with moderate hepatic steatosis  There is a hypoechoic mass in the right lobe of the liver measuring 3 7 x 3 2 x 3 7 cm  Limited imaging of the main portal vein shows it to be patent and hepatopetal  There are enlarged maris hepatis nodes and perihepatic lymph nodes as well BILIARY: There is thickened gallbladder wall measuring 5 mm  Gallbladder is contracted    Echogenic non shadowing foci are demonstrated in the gallbladder  Due to the contracted gallbladder is difficult to determine if they represent small polyps or calculi  There is no pericholecystic fluid  Serene Spry sign is negative  No intrahepatic biliary dilatation  CBD measures 4 0 mm  No choledocholithiasis  KIDNEY: Right kidney measures 11 4 x 6 2 x 7 1 cm  Volume 264 8 mL Kidney within normal limits  ASCITES:   None  Impression: 1  Hepatic steatosis with the mass in the right lobe of the liver  Additional evaluation will be necessary to determine the nature of the mass  Jeana hepatis and perihepatic lymph node enlargement  Malignancy is a diagnosis of exclusion  Evaluation with contrast-enhanced CT or MRI abdomen should be obtained  2   Contracted gallbladder with thickened wall and echogenic foci within it  Findings are not typical for acute cholecystitis  Gallbladder could also be better evaluated on the follow-up CT or MRI exam  The study was marked in EPIC for immediate notification  Workstation performed: [id number]     CT abdomen pelvis with contrast    Result Date: 8/27/2022  Narrative: CT ABDOMEN AND PELVIS WITH IV CONTRAST INDICATION:   further evaluate US findings in liver  COMPARISON:  Right upper quadrant ultrasound from earlier today TECHNIQUE:  CT examination of the abdomen and pelvis was performed  Axial, sagittal, and coronal 2D reformatted images were created from the source data and submitted for interpretation  Radiation dose length product (DLP) for this visit:  1000 6 mGy-cm   This examination, like all CT scans performed in the [facility], was performed utilizing techniques to minimize radiation dose exposure, including the use of iterative  reconstruction and automated exposure control  IV Contrast:  65 mL of iohexol (OMNIPAQUE) Enteric Contrast:  Enteric contrast was not administered   FINDINGS: ABDOMEN LOWER CHEST:  No clinically significant abnormality identified in the visualized lower chest  LIVER/BILIARY TREE:  4 0 cm lesion in segment 8 of the liver central arterial phase and capsular enhancement with portal venous and delayed phase capsular enhancement with relative central washout suspicious for malignancy  No other similar liver lesions  No areas of hepatic vascular thrombosis  Hepatic steatosis is better appreciated on the ultrasound  GALLBLADDER:  Nondistended gallbladder with dependent gallstone  No pericholecystic inflammatory changes  SPLEEN:  Unremarkable  PANCREAS:  Single pancreatic head parenchymal calcifications in keeping with chronic pancreatitis  ADRENAL GLANDS:  Unremarkable  KIDNEYS/URETERS:  No hydronephrosis  Multiple left renal cortical scars  STOMACH AND BOWEL:  Unremarkable  APPENDIX:  No findings to suggest appendicitis  ABDOMINOPELVIC CAVITY:  No ascites  No pneumoperitoneum  Jeana hepatic node measuring 13 mm in short axis #3/22  VESSELS:  Unremarkable for patient's age  PELVIS REPRODUCTIVE ORGANS:  Unremarkable for patient's age  URINARY BLADDER:  Unremarkable  ABDOMINAL WALL/INGUINAL REGIONS:  Unremarkable  OSSEOUS STRUCTURES:  No acute fracture or destructive osseous lesion  Impression: 4 cm segment 8 hepatic lesion suspicious for malignancy, likely primary malignancy or metastatic disease  13 mm jeana hepatic node  The study was marked in [facility] for immediate notification  Workstation performed: [id number]           DAGOBERTO Jones      Please Note: "This note has been constructed using a voice recognition system  Therefore there may be syntax, spelling, and/or grammatical errors   Please call if you have any questions  "**

## 2022-08-30 LAB
AFP-TM SERPL-MCNC: 8.5 NG/ML (ref 0.5–8)
ALBUMIN SERPL BCP-MCNC: 2.8 G/DL (ref 3.5–5)
ALP SERPL-CCNC: 99 U/L (ref 46–116)
ALT SERPL W P-5'-P-CCNC: 219 U/L (ref 12–78)
ANION GAP SERPL CALCULATED.3IONS-SCNC: 8 MMOL/L (ref 4–13)
AST SERPL W P-5'-P-CCNC: 183 U/L (ref 5–45)
BASOPHILS # BLD AUTO: 0.06 THOUSANDS/ΜL (ref 0–0.1)
BASOPHILS NFR BLD AUTO: 1 % (ref 0–1)
BILIRUB SERPL-MCNC: 0.74 MG/DL (ref 0.2–1)
BUN SERPL-MCNC: 13 MG/DL (ref 5–25)
CALCIUM ALBUM COR SERPL-MCNC: 9.7 MG/DL (ref 8.3–10.1)
CALCIUM SERPL-MCNC: 8.7 MG/DL (ref 8.3–10.1)
CHLORIDE SERPL-SCNC: 103 MMOL/L (ref 96–108)
CO2 SERPL-SCNC: 28 MMOL/L (ref 21–32)
CREAT SERPL-MCNC: 0.84 MG/DL (ref 0.6–1.3)
EOSINOPHIL # BLD AUTO: 0.75 THOUSAND/ΜL (ref 0–0.61)
EOSINOPHIL NFR BLD AUTO: 10 % (ref 0–6)
ERYTHROCYTE [DISTWIDTH] IN BLOOD BY AUTOMATED COUNT: 12.8 % (ref 11.6–15.1)
GFR SERPL CREATININE-BSD FRML MDRD: 94 ML/MIN/1.73SQ M
GLUCOSE SERPL-MCNC: 109 MG/DL (ref 65–140)
HCT VFR BLD AUTO: 37.2 % (ref 36.5–49.3)
HGB BLD-MCNC: 12.1 G/DL (ref 12–17)
IMM GRANULOCYTES # BLD AUTO: 0.07 THOUSAND/UL (ref 0–0.2)
IMM GRANULOCYTES NFR BLD AUTO: 1 % (ref 0–2)
LIPASE SERPL-CCNC: 651 U/L (ref 73–393)
LYMPHOCYTES # BLD AUTO: 1.9 THOUSANDS/ΜL (ref 0.6–4.47)
LYMPHOCYTES NFR BLD AUTO: 25 % (ref 14–44)
MAGNESIUM SERPL-MCNC: 1.8 MG/DL (ref 1.6–2.6)
MCH RBC QN AUTO: 31.3 PG (ref 26.8–34.3)
MCHC RBC AUTO-ENTMCNC: 32.5 G/DL (ref 31.4–37.4)
MCV RBC AUTO: 96 FL (ref 82–98)
MONOCYTES # BLD AUTO: 0.99 THOUSAND/ΜL (ref 0.17–1.22)
MONOCYTES NFR BLD AUTO: 13 % (ref 4–12)
NEUTROPHILS # BLD AUTO: 3.76 THOUSANDS/ΜL (ref 1.85–7.62)
NEUTS SEG NFR BLD AUTO: 50 % (ref 43–75)
NRBC BLD AUTO-RTO: 0 /100 WBCS
PHOSPHATE SERPL-MCNC: 4.4 MG/DL (ref 2.3–4.1)
PLATELET # BLD AUTO: 151 THOUSANDS/UL (ref 149–390)
PMV BLD AUTO: 11.5 FL (ref 8.9–12.7)
POTASSIUM SERPL-SCNC: 3.9 MMOL/L (ref 3.5–5.3)
PROT SERPL-MCNC: 6.9 G/DL (ref 6.4–8.4)
RBC # BLD AUTO: 3.86 MILLION/UL (ref 3.88–5.62)
SODIUM SERPL-SCNC: 139 MMOL/L (ref 135–147)
WBC # BLD AUTO: 7.53 THOUSAND/UL (ref 4.31–10.16)

## 2022-08-30 PROCEDURE — 99232 SBSQ HOSP IP/OBS MODERATE 35: CPT | Performed by: INTERNAL MEDICINE

## 2022-08-30 PROCEDURE — 85025 COMPLETE CBC W/AUTO DIFF WBC: CPT | Performed by: NURSE PRACTITIONER

## 2022-08-30 PROCEDURE — 82105 ALPHA-FETOPROTEIN SERUM: CPT | Performed by: NURSE PRACTITIONER

## 2022-08-30 PROCEDURE — 83690 ASSAY OF LIPASE: CPT | Performed by: NURSE PRACTITIONER

## 2022-08-30 PROCEDURE — 87522 HEPATITIS C REVRS TRNSCRPJ: CPT | Performed by: NURSE PRACTITIONER

## 2022-08-30 PROCEDURE — NC001 PR NO CHARGE: Performed by: RADIOLOGY

## 2022-08-30 PROCEDURE — 87902 NFCT AGT GNTYP ALYS HEP C: CPT | Performed by: NURSE PRACTITIONER

## 2022-08-30 PROCEDURE — 99232 SBSQ HOSP IP/OBS MODERATE 35: CPT | Performed by: NURSE PRACTITIONER

## 2022-08-30 PROCEDURE — 0FB13ZX EXCISION OF RIGHT LOBE LIVER, PERCUTANEOUS APPROACH, DIAGNOSTIC: ICD-10-PCS | Performed by: RADIOLOGY

## 2022-08-30 PROCEDURE — 80053 COMPREHEN METABOLIC PANEL: CPT | Performed by: NURSE PRACTITIONER

## 2022-08-30 PROCEDURE — 84100 ASSAY OF PHOSPHORUS: CPT | Performed by: NURSE PRACTITIONER

## 2022-08-30 PROCEDURE — 83735 ASSAY OF MAGNESIUM: CPT | Performed by: NURSE PRACTITIONER

## 2022-08-30 RX ORDER — CHLORDIAZEPOXIDE HYDROCHLORIDE 10 MG/1
10 CAPSULE, GELATIN COATED ORAL EVERY 12 HOURS SCHEDULED
Status: DISCONTINUED | OUTPATIENT
Start: 2022-08-30 | End: 2022-08-31 | Stop reason: HOSPADM

## 2022-08-30 RX ADMIN — Medication 1 TABLET: at 10:15

## 2022-08-30 RX ADMIN — CHLORDIAZEPOXIDE HYDROCHLORIDE 10 MG: 10 CAPSULE ORAL at 03:46

## 2022-08-30 RX ADMIN — THIAMINE HCL TAB 100 MG 100 MG: 100 TAB at 10:15

## 2022-08-30 RX ADMIN — NICOTINE 1 PATCH: 21 PATCH, EXTENDED RELEASE TRANSDERMAL at 10:15

## 2022-08-30 RX ADMIN — PANTOPRAZOLE SODIUM 40 MG: 40 TABLET, DELAYED RELEASE ORAL at 05:34

## 2022-08-30 RX ADMIN — FOLIC ACID 1 MG: 1 TABLET ORAL at 10:15

## 2022-08-30 RX ADMIN — CHLORDIAZEPOXIDE HYDROCHLORIDE 10 MG: 10 CAPSULE ORAL at 20:31

## 2022-08-30 NOTE — ASSESSMENT & PLAN NOTE
Drinks about 3-4 beers on daily basis, last drink was 8/25/22  · Discussed alcohol cessation, patient now amenable   · Thiamine, folic acid, multivitamin given during admission  · No signs of withdrawal  · OK to stop librium

## 2022-08-30 NOTE — CONSULTS
e-Consult (IPC)  - Interventional Radiology  Hermelinda Starks 65 y o  male MRN: [id number]  Unit/Bed#: [address] [place] [address] A Encounter: [id number]    Interventional Radiology has been consulted to evaluate Hermelinda Starks    We were consulted by DAGOBERTO Leon concerning this patient with liver mass  IP Consult to IR  Consult performed by: Patrick Hanna MD  Consult ordered by: DAGOBERTO Leon        08/30/22    Assessment/Recommendation:   Liver mass seen on CT and MR 3 6 cm Segment 8 suspicious for FNH or cholangiocarcinoma  US biopsy tomorrow in IR      21-30 minutes, >50% of the total time devoted to medical consultative verbal/EMR discussion between providers  Written report will be generated in the EMR  Thank you for allowing Interventional Radiology to participate in the care of Hermelinda Starks  Please don't hesitate to call or TigerText us with any questions       Patrick Hanna MD

## 2022-08-30 NOTE — ASSESSMENT & PLAN NOTE
Smokes about 1 pack a day    · RN noted patient smoking in the bathroom overnight on 8/28 - patient stated his brother brought him a cigarette   · Nicotine patch and Nicorette gum offered  · Discussed the importance of smoking cessation

## 2022-08-30 NOTE — ASSESSMENT & PLAN NOTE
· US RUQ reviewed  · CT A/P: "4 cm segment 8 hepatic lesion suspicious for malignancy, likely primary malignancy or metastatic disease "  · MRCP showed 3 6 cm mass suspicious for FNH or cholangiocarcinoma  · Discussed with patient and his brother via telephone   · S/p US biopsy by IR on 8/31  · Follow-up with GI for results in 1 week

## 2022-08-30 NOTE — PLAN OF CARE
Problem: GASTROINTESTINAL - ADULT  Goal: Minimal or absence of nausea and/or vomiting  Description: INTERVENTIONS:  - Administer IV fluids if ordered to ensure adequate hydration  - Maintain NPO status until nausea and vomiting are resolved  - Nasogastric tube if ordered  - Administer ordered antiemetic medications as needed  - Provide nonpharmacologic comfort measures as appropriate  - Advance diet as tolerated, if ordered  - Consider nutrition services referral to assist patient with adequate nutrition and appropriate food choices  8/30/2022 0248 by Codie Rubio RN  Outcome: Progressing  8/30/2022 0248 by Codie Rubio RN  Outcome: Progressing     Problem: GASTROINTESTINAL - ADULT  Goal: Maintains adequate nutritional intake  Description: INTERVENTIONS:  - Monitor percentage of each meal consumed  - Identify factors contributing to decreased intake, treat as appropriate  - Assist with meals as needed  - Monitor I&O, weight, and lab values if indicated  - Obtain nutrition services referral as needed  8/30/2022 0248 by Codie Rubio RN  Outcome: Progressing  8/30/2022 0248 by Codie Rubio RN  Outcome: Progressing     Problem: METABOLIC, FLUID AND ELECTROLYTES - ADULT  Goal: Electrolytes maintained within normal limits  Description: INTERVENTIONS:  - Monitor labs and assess patient for signs and symptoms of electrolyte imbalances  - Administer electrolyte replacement as ordered  - Monitor response to electrolyte replacements, including repeat lab results as appropriate  - Instruct patient on fluid and nutrition as appropriate  8/30/2022 0248 by Codie Rubio RN  Outcome: Progressing  8/30/2022 0248 by Codie Rubio RN  Outcome: Progressing

## 2022-08-30 NOTE — PROGRESS NOTES
Progress Note - [facility]  Khadar Land 65 y o  male MRN: [id number]    Unit/Bed#: [address] [place] [address] A Encounter: [id number]      Assessment/Plan:  1  Acute on chronic pancreatitis  65year-old with significant daily alcohol use for the past 6 years presented with epigastric pain found to have elevated lipase of 4000  CT scan without acute pancreatitis however calcifications noted in keeping with chronic pancreatitis   Possible cholelithiasis on ultrasound   AST and ALT remain elevated and Alk-phos and total bilirubin within normal limits on 8/30  BUN and creatinine within normal limits  No leukocytosis, white blood count 7 53 on 08/30     Reports bowel movements are usually regular  Denies any recent unintentional weight loss   Denies prior known history of pancreatitis  Chronic pancreatitis most likely due to alcohol use       -Low fat diet as tolerated  -supportive care with pain medication and antiemetics as needed  -Continue to follow liver tests  -monitor abdominal exam   -discussed the importance of complete alcohol cessation  Explained to patient consequences of continued alcohol use in relationship to pancreatitis  Patient reported he will stop consuming alcohol   -recommend primary team providing resources to help with alcohol cessation   - MRI  done 8/29 showed normal MRCP without evidence of choledocholithiasis  Cholelithiasis  Questionable minimal edematous pancreatic parenchyma which could be manifestation of recent acute intestinal edematous pancreatitis  No significant peripancreatic fluid collection or evidence of pancreatic necrosis  Mild maris hepatis adenopathy, nonspecific    Metastatic disease cannot be excluded although the possibility of reactive adenopathy is possible in the setting of recent acute pancreatitis      2  Hepatic lesion with elevated LFTs  Patient with new finding of 4 cm hepatic lesion noted suspicious for malignancy possibly primary or metastatic disease   Patient reports prior to this episode he was feeling well with no decreased appetite, unintentional weight loss or early satiety   He has chronic daily alcohol use for over 6 years   Patient also smokes a pack a day since the age of 16  No history of hepatitis        -MRI done 8/29 showed 3 6 cm indeterminate solid mass segment 8 right hepatic lobe suspicious for malignancy  Leading differential etiologies include fibrolamellar hepatocellular carcinoma or possibility intrahepatic cholangiocarcinoma  Biopsy is recommended  Background hepatic steatosis  7 mm arterial enhancing lesion in segment 3 left hepatic lobe with features most suggestive of flash enhancement of cavernous hemangioma  Attention on three-month follow-up advised  -Consult IR for liver biopsy  Spoke to IR Dr Ha by tiger text liver biopsy will be done tomorrow  Patient made NPO after midnight for liver biopsy   -continue to follow liver enzymes  -hepatitis panel positive for hepatitis C  Hepatitis C genotype and viral load pending  Patient aware that if hepatitis C genotype and viral load is positive he will need treatment for hepatitis-C  -AFP pending  Subjective:   Lying in bed in no acute distress  Patient remains asymptomatic  Tolerating diet  No nausea, vomiting, acid reflux, heartburn, epigastric or abdominal pain  Patient denies diarrhea  Patient states he feels well  Objective:     Vitals: Blood pressure 159/75, pulse 58, temperature 97 9 °F (36 6 °C), temperature source Oral, resp  rate 18, height 5' 7" (1 702 m), weight 68 kg (150 lb), SpO2 99 %  ,Body mass index is 23 49 kg/m²  No intake or output data in the 24 hours ending 08/30/22 1154    Physical Exam: Physical Exam  Vitals and nursing note reviewed  Constitutional:       General: He is not in acute distress  Cardiovascular:      Rate and Rhythm: Normal rate and regular rhythm  Pulses: Normal pulses  Heart sounds: Normal heart sounds     Pulmonary:      Effort: Pulmonary effort is normal  No respiratory distress  Breath sounds: Normal breath sounds  No stridor  No wheezing, rhonchi or rales  Abdominal:      General: Bowel sounds are normal  There is no distension  Palpations: Abdomen is soft  There is no mass  Tenderness: There is no abdominal tenderness  There is no guarding or rebound  Hernia: No hernia is present  Musculoskeletal:      Right lower leg: No edema  Left lower leg: No edema  Skin:     General: Skin is warm and dry  Capillary Refill: Capillary refill takes less than 2 seconds  Coloration: Skin is not jaundiced or pale  Neurological:      Mental Status: He is alert and oriented to person, place, and time     Psychiatric:         Mood and Affect: Mood normal          Invasive Devices  Report    Peripheral Intravenous Line  Duration           Peripheral IV 08/27/22 Left Antecubital 2 days                Current Facility-Administered Medications:     acetaminophen (TYLENOL) tablet 650 mg, 650 mg, Oral, Q6H PRN, Navid Oro MD    chlordiazePOXIDE (LIBRIUM) capsule 10 mg, 10 mg, Oral, Q12H [address], DAGOBERTO Sandoval    folic acid (FOLVITE) tablet 1 mg, 1 mg, Oral, Daily, Navid Oro MD, 1 mg at 08/30/22 1015    LORazepam (ATIVAN) tablet 0 5 mg, 0 5 mg, Oral, Q8H PRN, DAGOBERTO Sandoval    morphine injection 1 mg, 1 mg, Intravenous, Q6H PRN, Zahraa Rivera PA-C    multivitamin-minerals (CENTRUM) tablet 1 tablet, 1 tablet, Oral, Daily, Navid Oro MD, 1 tablet at 08/30/22 1015    nicotine (NICODERM CQ) 21 mg/24 hr TD 24 hr patch 1 patch, 1 patch, Transdermal, Daily, Navid Oro MD, 1 patch at 08/30/22 1015    nicotine polacrilex (NICORETTE) gum 2 mg, 2 mg, Oral, Q2H PRN, DAGOBERTO Sandvoal    ondansetron [facility]) injection 4 mg, 4 mg, Intravenous, Q6H PRN, Navid Oro MD    oxyCODONE (ROXICODONE) IR tablet 2 5 mg, 2 5 mg, Oral, Q6H PRN, Lore Rodriguez PA-C   pantoprazole (PROTONIX) EC tablet 40 mg, 40 mg, Oral, Early Morning, Zahraa DIONNE Rivera, 40 mg at 08/30/22 0534    thiamine tablet 100 mg, 100 mg, Oral, Daily, Zahraa DIONNE Rivera, 100 mg at 08/30/22 1015    Lab Results:   Recent Results (from the past 24 hour(s))   Lipase    Collection Time: 08/30/22  5:30 AM   Result Value Ref Range    Lipase 651 (H) 73 - 393 u/L   Comprehensive metabolic panel    Collection Time: 08/30/22  5:30 AM   Result Value Ref Range    Sodium 139 135 - 147 mmol/L    Potassium 3 9 3 5 - 5 3 mmol/L    Chloride 103 96 - 108 mmol/L    CO2 28 21 - 32 mmol/L    ANION GAP 8 4 - 13 mmol/L    BUN 13 5 - 25 mg/dL    Creatinine 0 84 0 60 - 1 30 mg/dL    Glucose 109 65 - 140 mg/dL    Calcium 8 7 8 3 - 10 1 mg/dL    Corrected Calcium 9 7 8 3 - 10 1 mg/dL     (H) 5 - 45 U/L     (H) 12 - 78 U/L    Alkaline Phosphatase 99 46 - 116 U/L    Total Protein 6 9 6 4 - 8 4 g/dL    Albumin 2 8 (L) 3 5 - 5 0 g/dL    Total Bilirubin 0 74 0 20 - 1 00 mg/dL    eGFR 94 ml/min/1 73sq m   CBC and differential    Collection Time: 08/30/22  5:30 AM   Result Value Ref Range    WBC 7 53 4 31 - 10 16 Thousand/uL    RBC 3 86 (L) 3 88 - 5 62 Million/uL    Hemoglobin 12 1 12 0 - 17 0 g/dL    Hematocrit 37 2 36 5 - 49 3 %    MCV 96 82 - 98 fL    MCH 31 3 26 8 - 34 3 pg    MCHC 32 5 31 4 - 37 4 g/dL    RDW 12 8 11 6 - 15 1 %    MPV 11 5 8 9 - 12 7 fL    Platelets 267 948 - 771 Thousands/uL    nRBC 0 /100 WBCs    Neutrophils Relative 50 43 - 75 %    Immat GRANS % 1 0 - 2 %    Lymphocytes Relative 25 14 - 44 %    Monocytes Relative 13 (H) 4 - 12 %    Eosinophils Relative 10 (H) 0 - 6 %    Basophils Relative 1 0 - 1 %    Neutrophils Absolute 3 76 1 85 - 7 62 Thousands/µL    Immature Grans Absolute 0 07 0 00 - 0 20 Thousand/uL    Lymphocytes Absolute 1 90 0 60 - 4 47 Thousands/µL    Monocytes Absolute 0 99 0 17 - 1 22 Thousand/µL    Eosinophils Absolute 0 75 (H) 0 00 - 0 61 Thousand/µL    Basophils Absolute 0 06 0 00 - 0 10 Thousands/µL   Magnesium    Collection Time: 08/30/22  5:30 AM   Result Value Ref Range    Magnesium 1 8 1 6 - 2 6 mg/dL   Phosphorus    Collection Time: 08/30/22  5:30 AM   Result Value Ref Range    Phosphorus 4 4 (H) 2 3 - 4 1 mg/dL             Imaging Studies: MRI abdomen w wo contrast and mrcp    Result Date: 8/29/2022  Narrative: MRI OF THE ABDOMEN WITH AND WITHOUT CONTRAST WITH MRCP INDICATION: Liver lesion on ct, pancreatitis with elevated LFT, r/o choledocholithiasis and classify liver lesion  COMPARISON: CT abdomen pelvis and right upper quadrant ultrasound 8/27/2022 TECHNIQUE:  The following pulse sequences were obtained:  Coronal and axial T2 with TE of 90 and 180 respectively, axial T2 with fat saturation, axial FIESTA fat-sat, axial T1-weighted in-and-out-of phase, axial DWI/ADC, pre-contrast axial T1 with fat saturation, post-contrast dynamic axial T1 with fat saturation at 20, 70, and 180 seconds, followed by coronal and 7 minute delayed axial T1 with fat saturation  3D MRCP images were obtained with radial thick slabs and projections  3D rendering was performed from the acquisition scanner  Pre- and postcontrast subtraction images were also obtained  IV Contrast:  7 mL of Gadobutrol injection (SINGLE-DOSE) FINDINGS: LOWER CHEST:   Unremarkable  LIVER: Normal in size and configuration  There is loss of background signal on out of phase imaging compatible with fatty infiltration  In segment 8 of the right hepatic lobe there is a 3 6 x 3 1 x 3 6 cm mass which is iso to low T1 signal and predominantly T2 hyperintense with some low signal posteriorly  This appears to demonstrate minimal heterogeneous enhancement on arterial phase imaging with more diffuse internal enhancement seen on portal venous imaging  A rim of capsular enhancement is also present best depicted on delayed imaging  There is some central diffusion restriction  Some microscopic fat may be present within the central mass as well  No discernible capsular retraction or regional intrahepatic biliary dilatation  7 x 5 mm arterial enhancing nodule within the lateral segment left lobe (segment 3) which demonstrates T1 hypointensity and T2 hyperintensity  This remains visible on all postcontrast sequences including delayed imaging  Long TE T2 signal is higher than that of spleen and there is no diffusion restriction on ADC map  This is favored to represent flash enhancement of hemangioma  The hepatic veins and portal veins are patent  BILE DUCTS:  No intrahepatic bile duct dilation  Common bile duct is normal in caliber  No choledocholithiasis, biliary stricture or suspicious mass  GALLBLADDER:  Cholelithiasis without pericholecystic edema  PANCREAS:  Question minimally edematous parenchyma (neck/proximal body) which could be manifestation of recent acute interstitial edematous pancreatitis  No significant peripancreatic fluid collection or evidence of pancreatic necrosis  Tiny calcification seen in the pancreatic head on CT is not clearly visualized on MRI  The pancreatic duct is normal caliber  No evidence of pancreatic divisum  ADRENAL GLANDS:  Normal  SPLEEN:  Normal  KIDNEYS/PROXIMAL URETERS:  No hydroureteronephrosis  No suspicious renal mass  BOWEL:   No dilated loops of bowel  PERITONEUM/RETROPERITONEUM:  No ascites  LYMPH NODES:  12 mm portacaval lymph node series 13/192   5 mm short axis lymph node posterior to the left hepatic lobe above the pancreas  VASCULAR STRUCTURES:  No aneurysm  ABDOMINAL WALL:  Unremarkable  OSSEOUS STRUCTURES:  No suspicious osseous lesion  Degenerative endplate changes seen at the L4-5 level  Impression: 1   3 6 cm indeterminate solid mass segment 8 right hepatic lobe suspicious for malignancy  Leading differential etiologies include fibrolamellar hepatocellular carcinoma (favored) or possibly intrahepatic cholangiocarcinoma  Biopsy is recommended  Background hepatic steatosis   2  Mild maris hepatis adenopathy, nonspecific  Metastatic disease cannot be excluded although the possibility of reactive adenopathy is possible in the setting of recent acute pancreatitis  3  7 mm arterial enhancing lesion in segment 3 left hepatic lobe with features most suggestive of flash enhancement of cavernous hemangioma  Attention on three-month follow-up advised  4  Question minimally edematous pancreatic parenchyma (neck/proximal body) which could be manifestation of recent acute interstitial edematous pancreatitis  No significant peripancreatic fluid collection or evidence of pancreatic necrosis  5  Normal MRCP without evidence of choledocholithiasis  6  Cholelithiasis  The study was marked in [facility] for immediate notification and follow-up  Workstation performed: [id number]     US right upper quadrant    Result Date: 8/27/2022  Narrative: RIGHT UPPER QUADRANT ULTRASOUND INDICATION:     epigastric pain, vomiting after eating beginning thursday  COMPARISON:  None TECHNIQUE:   Real-time ultrasound of the right upper quadrant was performed with a curvilinear transducer with both volumetric sweeps and still imaging techniques  FINDINGS: PANCREAS:  Portions of the pancreas are obscured by bowel gas  Visualized portions of the pancreas are unremarkable  AORTA AND IVC:  Visualized portions are normal for patient age  LIVER: Size:  Within normal range  The liver measures 16 3 cm in the midclavicular line  Contour:  Surface contour is smooth  Parenchyma: There is moderate diffuse increased echogenicity with smooth echotexture and acoustic beam attenuation  Most consistent with moderate hepatic steatosis  There is a hypoechoic mass in the right lobe of the liver measuring 3 7 x 3 2 x 3 7 cm  Limited imaging of the main portal vein shows it to be patent and hepatopetal  There are enlarged maris hepatis nodes and perihepatic lymph nodes as well BILIARY: There is thickened gallbladder wall measuring 5 mm    Gallbladder is contracted  Echogenic non shadowing foci are demonstrated in the gallbladder  Due to the contracted gallbladder is difficult to determine if they represent small polyps or calculi  There is no pericholecystic fluid  Rocky Chant sign is negative  No intrahepatic biliary dilatation  CBD measures 4 0 mm  No choledocholithiasis  KIDNEY: Right kidney measures 11 4 x 6 2 x 7 1 cm  Volume 264 8 mL Kidney within normal limits  ASCITES:   None  Impression: 1  Hepatic steatosis with the mass in the right lobe of the liver  Additional evaluation will be necessary to determine the nature of the mass  Jeana hepatis and perihepatic lymph node enlargement  Malignancy is a diagnosis of exclusion  Evaluation with contrast-enhanced CT or MRI abdomen should be obtained  2   Contracted gallbladder with thickened wall and echogenic foci within it  Findings are not typical for acute cholecystitis  Gallbladder could also be better evaluated on the follow-up CT or MRI exam  The study was marked in [facility] for immediate notification  Workstation performed: [id number]     CT abdomen pelvis with contrast    Result Date: 8/27/2022  Narrative: CT ABDOMEN AND PELVIS WITH IV CONTRAST INDICATION:   further evaluate US findings in liver  COMPARISON:  Right upper quadrant ultrasound from earlier today TECHNIQUE:  CT examination of the abdomen and pelvis was performed  Axial, sagittal, and coronal 2D reformatted images were created from the source data and submitted for interpretation  Radiation dose length product (DLP) for this visit:  1000 6 mGy-cm   This examination, like all CT scans performed in the [facility], was performed utilizing techniques to minimize radiation dose exposure, including the use of iterative  reconstruction and automated exposure control  IV Contrast:  65 mL of iohexol (OMNIPAQUE) Enteric Contrast:  Enteric contrast was not administered   FINDINGS: ABDOMEN LOWER CHEST:  No clinically significant abnormality identified in the visualized lower chest  LIVER/BILIARY TREE:  4 0 cm lesion in segment 8 of the liver central arterial phase and capsular enhancement with portal venous and delayed phase capsular enhancement with relative central washout suspicious for malignancy  No other similar liver lesions  No areas of hepatic vascular thrombosis  Hepatic steatosis is better appreciated on the ultrasound  GALLBLADDER:  Nondistended gallbladder with dependent gallstone  No pericholecystic inflammatory changes  SPLEEN:  Unremarkable  PANCREAS:  Single pancreatic head parenchymal calcifications in keeping with chronic pancreatitis  ADRENAL GLANDS:  Unremarkable  KIDNEYS/URETERS:  No hydronephrosis  Multiple left renal cortical scars  STOMACH AND BOWEL:  Unremarkable  APPENDIX:  No findings to suggest appendicitis  ABDOMINOPELVIC CAVITY:  No ascites  No pneumoperitoneum  Jeana hepatic node measuring 13 mm in short axis #3/22  VESSELS:  Unremarkable for patient's age  PELVIS REPRODUCTIVE ORGANS:  Unremarkable for patient's age  URINARY BLADDER:  Unremarkable  ABDOMINAL WALL/INGUINAL REGIONS:  Unremarkable  OSSEOUS STRUCTURES:  No acute fracture or destructive osseous lesion  Impression: 4 cm segment 8 hepatic lesion suspicious for malignancy, likely primary malignancy or metastatic disease  13 mm jeana hepatic node  The study was marked in [facility] for immediate notification  Workstation performed: [id number]           Trecia Goldmann, CRNP      Please Note: "This note has been constructed using a voice recognition system  Therefore there may be syntax, spelling, and/or grammatical errors   Please call if you have any questions  "**

## 2022-08-30 NOTE — PROGRESS NOTES
Danny 46  Progress Note Midge Combe 1960, 65 y o  male MRN: [id number]  Unit/Bed#: [address] [place] [address] A Encounter: [id number]  Primary Care Provider: No primary care provider on file  Date and time admitted to hospital: 8/27/2022  1:02 PM    * Acute pancreatitis  Assessment & Plan  Presented with epigastric and right upper quadrant pain for 3 days associated with nausea in nonbloody nonbilious vomiting  Symptoms worsened by and followed by eating  · Lipase improved with IVF and bowel rest   · Advanced to low fat diet, tolerating  · Supportive care with antiemetics PRN   · Monitor abdominal exam  · Trend LFT and Lipase   · Encourage alcohol and nicotine cessation  · GI following, appreciate input   · MRCP negative for choledocholithiasis     Hepatic lesion with elevated LFTs  Assessment & Plan  · US RUQ reviewed  · CT A/P: "4 cm segment 8 hepatic lesion suspicious for malignancy, likely primary malignancy or metastatic disease "  · MRCP showed 3 6 cm mass suspicious for FNH or cholangiocarcinoma  · IR consulted   · Planning for US biopsy tomorrow in IR  · NPO at midnight      Hepatitis C  Assessment & Plan  Hepatitis panel positive for Hep C  · Unclear if this is acute or chronic  · Follow-up Hep C genotype and viral load  · Appreciate GI input     Nicotine dependence  Assessment & Plan  Smokes about 1 pack a day    · RN noted patient smoking in the bathroom overnight on 8/28 - patient stated his brother brought him a cigarette   · Nicotine patch and Nicorette gum offered  · Discussed the importance of smoking cessation     Alcohol abuse  Assessment & Plan  Drinks about 3-4 beers on daily basis, last drink was 8/25/22  · Discussed alcohol cessation, patient now amenable   · Thiamine, folic acid, multivitamin  · CIWA protocol   · Wean Librium to 10 mg q12h     Abnormal urinalysis-resolved as of 8/29/2022  Assessment & Plan  · UA noted to have 2+ protein, positive nitrite, negative leukocyte, trace ketone, moderate bilirubin, blood, 2-4 RBC, 0-1 WBC, moderate bacteria  · Patient denies any urinary complaints except frequency  Denies any hematuria or dysuria  · Urine culture with no growth   · No need for treatment       VTE Pharmacologic Prophylaxis: VTE Score: 1 High Risk (Score >/= 5) - Pharmacological DVT Prophylaxis Contraindicated  Sequential Compression Devices Ordered  Patient Centered Rounds: I performed bedside rounds with nursing staff today  Discussions with Specialists or Other Care Team Provider: nursing, CM, GI, appreciate IR input     Education and Discussions with Family / Patient: Updated  (brother) via phone  Time Spent for Care: 30 minutes  More than 50% of total time spent on counseling and coordination of care as described above  Current Length of Stay: 3 day(s)  Current Patient Status: Inpatient   Certification Statement: The patient will continue to require additional inpatient hospital stay due to hepatic lesion and Hep C requiring further workup   Discharge Plan: Anticipate discharge in 48-72 hrs to home  Code Status: Level 1 - Full Code    Subjective:   Patient seen and examined at bedside  Aware liver biopsy will be scheduled for tomorrow  Happy to be able to have breakfast this morning  Would like to go outside for some fresh air  Denies HA, dizziness, CP, SOB, or abdominal pain  Objective:     Vitals:   Temp (24hrs), Av 1 °F (36 7 °C), Min:97 8 °F (36 6 °C), Max:98 6 °F (37 °C)    Temp:  [97 8 °F (36 6 °C)-98 6 °F (37 °C)] 97 9 °F (36 6 °C)  HR:  [56-61] 58  Resp:  [18] 18  BP: (138-161)/(69-82) 159/75  SpO2:  [97 %-99 %] 99 %  Body mass index is 23 49 kg/m²  Input and Output Summary (last 24 hours):   No intake or output data in the 24 hours ending 22 1055    Physical Exam:   Physical Exam  Vitals and nursing note reviewed  Constitutional:       General: He is not in acute distress       Appearance: He is not ill-appearing, toxic-appearing or diaphoretic  Comments: Pleasant gentleman resting in bed on room air, speaking full sentences without difficulty    HENT:      Head: Normocephalic  Mouth/Throat:      Mouth: Mucous membranes are moist    Eyes:      Conjunctiva/sclera: Conjunctivae normal    Cardiovascular:      Rate and Rhythm: Normal rate  Pulmonary:      Effort: Pulmonary effort is normal       Breath sounds: Normal breath sounds  No wheezing, rhonchi or rales  Abdominal:      General: Bowel sounds are normal       Palpations: Abdomen is soft  Musculoskeletal:         General: Normal range of motion  Cervical back: Normal range of motion  Right lower leg: No edema  Left lower leg: No edema  Skin:     General: Skin is warm and dry  Capillary Refill: Capillary refill takes less than 2 seconds  Neurological:      Mental Status: He is alert and oriented to person, place, and time  Mental status is at baseline  Psychiatric:         Mood and Affect: Mood is anxious  Speech: Speech is rapid and pressured  Behavior: Behavior normal  Behavior is cooperative            Additional Data:     Labs:  Results from last 7 days   Lab Units 08/30/22  0530   WBC Thousand/uL 7 53   HEMOGLOBIN g/dL 12 1   HEMATOCRIT % 37 2   PLATELETS Thousands/uL 151   NEUTROS PCT % 50   LYMPHS PCT % 25   MONOS PCT % 13*   EOS PCT % 10*     Results from last 7 days   Lab Units 08/30/22  0530   SODIUM mmol/L 139   POTASSIUM mmol/L 3 9   CHLORIDE mmol/L 103   CO2 mmol/L 28   BUN mg/dL 13   CREATININE mg/dL 0 84   ANION GAP mmol/L 8   CALCIUM mg/dL 8 7   ALBUMIN g/dL 2 8*   TOTAL BILIRUBIN mg/dL 0 74   ALK PHOS U/L 99   ALT U/L 219*   AST U/L 183*   GLUCOSE RANDOM mg/dL 109     Results from last 7 days   Lab Units 08/29/22  0436   INR  1 24*                   Lines/Drains:  Invasive Devices  Report    Peripheral Intravenous Line  Duration           Peripheral IV 08/27/22 Left Antecubital 2 days Imaging: Reviewed radiology reports from this admission including: MRI abdomen/MRCP    Recent Cultures (last 7 days):   Results from last 7 days   Lab Units 08/27/22  1434   URINE CULTURE  No Growth <1000 cfu/mL       Last 24 Hours Medication List:   Current Facility-Administered Medications   Medication Dose Route Frequency Provider Last Rate    acetaminophen  650 mg Oral Q6H PRN Ann-Marie Mckee MD      chlordiazePOXIDE  10 mg Oral Q12H [address] DAGOBERTO Mckeon      folic acid  1 mg Oral Daily Ann-Marie Mckee MD      LORazepam  0 5 mg Oral Q8H PRN DAGOBERTO Mckeon      morphine injection  1 mg Intravenous Q6H PRN Zahraa Rivera PA-C      multivitamin-minerals  1 tablet Oral Daily Ann-Marie Mckee MD      nicotine  1 patch Transdermal Daily Ann-Marie Mckee MD      nicotine polacrilex  2 mg Oral Q2H PRN DAGOBERTO Mckeon      ondansetron  4 mg Intravenous Q6H PRN Ann-Marie Mckee MD      oxyCODONE  2 5 mg Oral Q6H PRN Zahraa Rivera PA-C      pantoprazole  40 mg Oral Early Morning Zahraa Rivera PA-C      thiamine  100 mg Oral Daily DIONNE Ramos          Today, Patient Was Seen By: DAGOBERTO Mckeon    **Please Note: This note may have been constructed using a voice recognition system  **

## 2022-08-30 NOTE — ASSESSMENT & PLAN NOTE
Hepatitis panel positive for Hep C  · Unclear if this is acute or chronic  · Follow-up Hep C genotype and viral load  · Follow-up with GI

## 2022-08-31 ENCOUNTER — APPOINTMENT (OUTPATIENT)
Dept: NON INVASIVE DIAGNOSTICS | Facility: HOSPITAL | Age: 62
DRG: 282 | End: 2022-08-31
Attending: RADIOLOGY
Payer: COMMERCIAL

## 2022-08-31 VITALS
OXYGEN SATURATION: 99 % | HEART RATE: 72 BPM | HEIGHT: 67 IN | BODY MASS INDEX: 23.54 KG/M2 | TEMPERATURE: 97.6 F | DIASTOLIC BLOOD PRESSURE: 80 MMHG | RESPIRATION RATE: 18 BRPM | WEIGHT: 150 LBS | SYSTOLIC BLOOD PRESSURE: 137 MMHG

## 2022-08-31 LAB
ALBUMIN SERPL BCP-MCNC: 2.9 G/DL (ref 3.5–5)
ALP SERPL-CCNC: 104 U/L (ref 46–116)
ALT SERPL W P-5'-P-CCNC: 243 U/L (ref 12–78)
ANION GAP SERPL CALCULATED.3IONS-SCNC: 9 MMOL/L (ref 4–13)
AST SERPL W P-5'-P-CCNC: 206 U/L (ref 5–45)
BILIRUB SERPL-MCNC: 0.75 MG/DL (ref 0.2–1)
BUN SERPL-MCNC: 14 MG/DL (ref 5–25)
CALCIUM ALBUM COR SERPL-MCNC: 9.5 MG/DL (ref 8.3–10.1)
CALCIUM SERPL-MCNC: 8.6 MG/DL (ref 8.3–10.1)
CHLORIDE SERPL-SCNC: 103 MMOL/L (ref 96–108)
CO2 SERPL-SCNC: 29 MMOL/L (ref 21–32)
CREAT SERPL-MCNC: 1.07 MG/DL (ref 0.6–1.3)
GFR SERPL CREATININE-BSD FRML MDRD: 74 ML/MIN/1.73SQ M
GLUCOSE SERPL-MCNC: 111 MG/DL (ref 65–140)
LIPASE SERPL-CCNC: 2712 U/L (ref 73–393)
MAGNESIUM SERPL-MCNC: 1.8 MG/DL (ref 1.6–2.6)
POTASSIUM SERPL-SCNC: 4.3 MMOL/L (ref 3.5–5.3)
PROT SERPL-MCNC: 7.4 G/DL (ref 6.4–8.4)
SODIUM SERPL-SCNC: 141 MMOL/L (ref 135–147)

## 2022-08-31 PROCEDURE — 47000 NEEDLE BIOPSY OF LIVER PERQ: CPT

## 2022-08-31 PROCEDURE — 99232 SBSQ HOSP IP/OBS MODERATE 35: CPT | Performed by: INTERNAL MEDICINE

## 2022-08-31 PROCEDURE — 99239 HOSP IP/OBS DSCHRG MGMT >30: CPT | Performed by: NURSE PRACTITIONER

## 2022-08-31 PROCEDURE — 99152 MOD SED SAME PHYS/QHP 5/>YRS: CPT

## 2022-08-31 PROCEDURE — 83690 ASSAY OF LIPASE: CPT | Performed by: NURSE PRACTITIONER

## 2022-08-31 PROCEDURE — 80053 COMPREHEN METABOLIC PANEL: CPT | Performed by: NURSE PRACTITIONER

## 2022-08-31 PROCEDURE — 88342 IMHCHEM/IMCYTCHM 1ST ANTB: CPT | Performed by: SPECIALIST

## 2022-08-31 PROCEDURE — 83735 ASSAY OF MAGNESIUM: CPT | Performed by: NURSE PRACTITIONER

## 2022-08-31 PROCEDURE — 88307 TISSUE EXAM BY PATHOLOGIST: CPT | Performed by: SPECIALIST

## 2022-08-31 PROCEDURE — 88341 IMHCHEM/IMCYTCHM EA ADD ANTB: CPT | Performed by: SPECIALIST

## 2022-08-31 PROCEDURE — 76942 ECHO GUIDE FOR BIOPSY: CPT | Performed by: RADIOLOGY

## 2022-08-31 PROCEDURE — 88333 PATH CONSLTJ SURG CYTO XM 1: CPT | Performed by: SPECIALIST

## 2022-08-31 PROCEDURE — 99152 MOD SED SAME PHYS/QHP 5/>YRS: CPT | Performed by: RADIOLOGY

## 2022-08-31 PROCEDURE — 88313 SPECIAL STAINS GROUP 2: CPT | Performed by: SPECIALIST

## 2022-08-31 PROCEDURE — 99153 MOD SED SAME PHYS/QHP EA: CPT

## 2022-08-31 PROCEDURE — 47000 NEEDLE BIOPSY OF LIVER PERQ: CPT | Performed by: RADIOLOGY

## 2022-08-31 RX ORDER — FOLIC ACID 1 MG/1
1 TABLET ORAL DAILY
Refills: 0
Start: 2022-09-01 | End: 2022-09-09 | Stop reason: SDUPTHER

## 2022-08-31 RX ORDER — FENTANYL CITRATE 50 UG/ML
INJECTION, SOLUTION INTRAMUSCULAR; INTRAVENOUS CODE/TRAUMA/SEDATION MEDICATION
Status: COMPLETED | OUTPATIENT
Start: 2022-08-31 | End: 2022-08-31

## 2022-08-31 RX ORDER — LANOLIN ALCOHOL/MO/W.PET/CERES
100 CREAM (GRAM) TOPICAL DAILY
Refills: 0
Start: 2022-09-01 | End: 2022-09-09 | Stop reason: SDUPTHER

## 2022-08-31 RX ORDER — NICOTINE 21 MG/24HR
1 PATCH, TRANSDERMAL 24 HOURS TRANSDERMAL DAILY
Qty: 28 PATCH | Refills: 0 | Status: SHIPPED | OUTPATIENT
Start: 2022-09-01 | End: 2022-09-09 | Stop reason: SDUPTHER

## 2022-08-31 RX ORDER — MIDAZOLAM HYDROCHLORIDE 2 MG/2ML
INJECTION, SOLUTION INTRAMUSCULAR; INTRAVENOUS CODE/TRAUMA/SEDATION MEDICATION
Status: COMPLETED | OUTPATIENT
Start: 2022-08-31 | End: 2022-08-31

## 2022-08-31 RX ADMIN — MIDAZOLAM 1 MG: 1 INJECTION INTRAMUSCULAR; INTRAVENOUS at 13:32

## 2022-08-31 RX ADMIN — CHLORDIAZEPOXIDE HYDROCHLORIDE 10 MG: 10 CAPSULE ORAL at 09:39

## 2022-08-31 RX ADMIN — FENTANYL CITRATE 50 MCG: 50 INJECTION, SOLUTION INTRAMUSCULAR; INTRAVENOUS at 13:34

## 2022-08-31 RX ADMIN — MIDAZOLAM 1 MG: 1 INJECTION INTRAMUSCULAR; INTRAVENOUS at 13:34

## 2022-08-31 RX ADMIN — NICOTINE 1 PATCH: 21 PATCH, EXTENDED RELEASE TRANSDERMAL at 09:38

## 2022-08-31 RX ADMIN — FENTANYL CITRATE 50 MCG: 50 INJECTION, SOLUTION INTRAMUSCULAR; INTRAVENOUS at 13:32

## 2022-08-31 RX ADMIN — FOLIC ACID 1 MG: 1 TABLET ORAL at 09:39

## 2022-08-31 RX ADMIN — THIAMINE HCL TAB 100 MG 100 MG: 100 TAB at 09:39

## 2022-08-31 RX ADMIN — PANTOPRAZOLE SODIUM 40 MG: 40 TABLET, DELAYED RELEASE ORAL at 05:37

## 2022-08-31 RX ADMIN — Medication 1 TABLET: at 09:39

## 2022-08-31 NOTE — DISCHARGE INSTRUCTIONS
Percutaneous Liver Biopsy   WHAT YOU NEED TO KNOW:   A PLB is a procedure to remove a sample of tissue from your liver  The sample can be sent to a lab and tested for liver disease, cancer, or infection  After the procedure you may have pain and bruising at the biopsy site  You may also have pain in your right shoulder  These symptoms should get better in 48 to 72 hours  DISCHARGE INSTRUCTIONS:     [facility] patients,  Contact Interventional Radiology at [phone] PATIENTS: Contact Interventional Radiology at [phone]   [facility] PATIENTS: Contact Interventional Radiology at [phone] if:    Fever greater than 101 or chills  You have severe pain in your abdomen  Your abdomen is larger than usual and feels hard  Your neck is more swollen and you have trouble swallowing  You feel weak or dizzy  Your heart is beating faster than usual    Your pain does not get better after you take pain medicine  Your wound is red, swollen, or draining pus  You have nausea or are vomiting  Your skin is itchy, swollen, or you have a rash  You have questions or concerns about your condition or care  Medicines:   Acetaminophen decreases pain and fever  It is available without a doctor's order  Acetaminophen can cause liver damage if not taken correctly  Take your home medicine as directed  Resume your normal diet  Small sips of flat soda will help with mild nausea  Self-care:   Rest as directed  Do not play sports, exercise, or lift anything heavier than 5 pounds for up to 1 week  Apply firm, steady pressure if bleeding occurs  A small amount of bleeding from your wound is possible  Apply pressure with a clean gauze or towel for 5 to 10 minutes  Call 911 if bleeding becomes heavy or does not stop  Ask your healthcare provider when to take your blood thinner or antiplatelet medicine  You may need to wait 24 to 72 hours to take your medicine   This will prevent bleeding  Follow up with your healthcare provider as directed: Write down your questions so you remember to ask them during your visits

## 2022-08-31 NOTE — DISCHARGE SUMMARY
Danny 46  Discharge- Jessica Jara 1960, 65 y o  male MRN: [id number]  Unit/Bed#: 2 [place] [address] A Encounter: [id number]  Primary Care Provider: No primary care provider on file  Date and time admitted to hospital: 8/27/2022  1:02 PM    * Acute pancreatitis  Assessment & Plan  Presented with epigastric and right upper quadrant pain for 3 days associated with nausea in nonbloody nonbilious vomiting  Symptoms worsened by and followed by eating  · Lipase improved with IVF and bowel rest   · Advanced to low fat diet, tolerating  · Lipase start to trend up, but patient asymptomatic   · Encouraged alcohol and nicotine cessation  · Appreciate nutrition recs   · GI following, appreciate input   · MRCP negative for choledocholithiasis   · Follow-up with GI    Hepatic lesion with elevated LFTs  Assessment & Plan  · US RUQ reviewed  · CT A/P: "4 cm segment 8 hepatic lesion suspicious for malignancy, likely primary malignancy or metastatic disease "  · MRCP showed 3 6 cm mass suspicious for FNH or cholangiocarcinoma  · Discussed with patient and his brother via telephone   · S/p US biopsy by IR on 8/31  · Follow-up with GI for results in 1 week     Hepatitis C  Assessment & Plan  Hepatitis panel positive for Hep C  · Unclear if this is acute or chronic  · Follow-up Hep C genotype and viral load  · Follow-up with GI     Nicotine dependence  Assessment & Plan  Smokes about 1 pack a day    · RN noted patient smoking in the bathroom overnight on 8/28 - patient stated his brother brought him a cigarette   · Nicotine patch and Nicorette gum offered  · Discussed the importance of smoking cessation     Alcohol abuse  Assessment & Plan  Drinks about 3-4 beers on daily basis, last drink was 8/25/22  · Discussed alcohol cessation, patient now amenable   · Thiamine, folic acid, multivitamin given during admission  · No signs of withdrawal  · OK to stop librium     Abnormal urinalysis-resolved as of 8/29/2022  Assessment & Plan  · UA noted to have 2+ protein, positive nitrite, negative leukocyte, trace ketone, moderate bilirubin, blood, 2-4 RBC, 0-1 WBC, moderate bacteria  · Patient denies any urinary complaints except frequency  Denies any hematuria or dysuria  · Urine culture with no growth   · No need for treatment         Medical Problems             Resolved Problems  Date Reviewed: 8/31/2022          Resolved    Abnormal urinalysis 8/29/2022     Resolved by  Per Turner              Discharging Physician / Practitioner: Per Turner  PCP: No primary care provider on file  Admission Date:   Admission Orders (From admission, onward)     Ordered        08/27/22 [address]  Once                      Discharge Date: 08/31/22    Consultations During Hospital Stay:  · Gastroenterology  · Interventional Radiology     Procedures Performed:   · Right upper quadrant ultrasound: 1  Hepatic steatosis with the mass in the right lobe of the liver  Additional evaluation will be necessary to determine the nature of the mass  Maris hepatis and perihepatic lymph node enlargement  Malignancy is a diagnosis of exclusion  Evaluation with contrast-enhanced CT or MRI abdomen should be obtained  2   Contracted gallbladder with thickened wall and echogenic foci within it  Findings are not typical for acute cholecystitis  Gallbladder could also be better evaluated on the follow-up CT or MRI exam     · CT abdomen and pelvis: 4 cm segment 8 hepatic lesion suspicious for malignancy, likely primary malignancy or metastatic disease  13 mm maris hepatic node  · MRCP:  1   3 6 cm indeterminate solid mass segment 8 right hepatic lobe suspicious for malignancy  Leading differential etiologies include fibrolamellar hepatocellular carcinoma (favored) or possibly intrahepatic cholangiocarcinoma  Biopsy is recommended  Background hepatic steatosis   2  Mild maris hepatis adenopathy, nonspecific  Metastatic disease cannot be excluded although the possibility of reactive adenopathy is possible in the setting of recent acute pancreatitis  3  7 mm arterial enhancing lesion in segment 3 left hepatic lobe with features most suggestive of flash enhancement of cavernous hemangioma  Attention on three-month follow-up advised   4  Question minimally edematous pancreatic parenchyma (neck/proximal body) which could be manifestation of recent acute interstitial edematous pancreatitis  No significant peripancreatic fluid collection or evidence of pancreatic necrosis  5  Normal MRCP without evidence of choledocholithiasis  6  Cholelithiasis  · Liver mass biopsy on 8/31/2022    Significant Findings / Test Results:   · Acute pancreatitis  · Liver mass suspicious for malignancy    Incidental Findings:   · Liver mass    Test Results Pending at Discharge (will require follow up): · Liver mass biopsy     Outpatient Tests Requested:  · CMP in 2 weeks  · Follow-up with GI to review liver mass biopsy results    Complications:  None    Reason for Admission:  Acute pancreatitis    Hospital Course: Ada Hoyt is a 65 y o  male patient who originally presented to the hospital on 8/27/2022 due to epigastric and right upper quadrant pain for 3 days associated with nausea and nonbloody non bilious vomiting  Symptoms were worsened by food  Patient was found to have acute pancreatitis with a lipase of 4000 along with elevated LFTs  Patient does have a history of daily alcohol and tobacco abuse  Patient was treated with bowel rest and aggressive IV hydration  Lipase started to downtrend  Patient was started on low-fat diet for which she is tolerating without nausea or vomiting  Abdominal pain has resolved  Patient is stable for discharge home  Patient was given information on pancreatitis and low-fat diet  Patient was advised to avoid all alcohol and tobacco products      Additionally, patient was noted to have a liver mass suspicious for malignancy  This was discussed with the patient and his brother  Patient underwent a liver biopsy on 8/31/2022  Patient tolerated procedure well  Patient will discharge home and follow-up with Gastroenterology to review biopsy results  Please see above list of diagnoses and related plan for additional information  Condition at Discharge: stable    Discharge Day Visit / Exam:   Subjective:  Patient seen and examined at bedside  Denies abdominal pain  Tolerating low-fat diet  Denies any headache, dizziness, chest pain, shortness compatible pain, nausea, vomiting, or diarrhea  Strongly desires discharge home  Tolerated liver biopsy without issues  Vitals: Blood Pressure: 137/80 (08/31/22 1543)  Pulse: 61 (08/31/22 1543)  Temperature: 97 6 °F (36 4 °C) (08/31/22 1543)  Temp Source: Oral (08/30/22 0742)  Respirations: 16 (08/31/22 1400)  Height: 5' 7" (170 2 cm) (08/27/22 1335)  Weight - Scale: 68 kg (150 lb) (08/29/22 0544)  SpO2: 99 % (08/31/22 1543)  Exam:   Physical Exam  Vitals and nursing note reviewed  Constitutional:       General: He is not in acute distress  Appearance: He is not toxic-appearing or diaphoretic  Comments: Pleasant, anxious gentleman resting comfortably in bed on room air   HENT:      Head: Normocephalic  Mouth/Throat:      Mouth: Mucous membranes are moist    Eyes:      Conjunctiva/sclera: Conjunctivae normal    Cardiovascular:      Rate and Rhythm: Normal rate  Pulmonary:      Effort: Pulmonary effort is normal       Breath sounds: Normal breath sounds  No wheezing, rhonchi or rales  Abdominal:      General: Bowel sounds are normal  There is no distension  Palpations: Abdomen is soft  Tenderness: There is no abdominal tenderness  Comments: Right upper quadrant band-aid clean dry and intact  No drainage  Musculoskeletal:         General: Normal range of motion  Cervical back: Normal range of motion  Right lower leg: No edema  Left lower leg: No edema  Skin:     General: Skin is warm and dry  Capillary Refill: Capillary refill takes less than 2 seconds  Neurological:      Mental Status: He is alert and oriented to person, place, and time  Mental status is at baseline  Psychiatric:         Mood and Affect: Mood normal          Speech: Speech is rapid and pressured  Behavior: Behavior normal           Discussion with Family: Updated  (brother) via phone  Discharge instructions/Information to patient and family:   See after visit summary for information provided to patient and family  Provisions for Follow-Up Care:  See after visit summary for information related to follow-up care and any pertinent home health orders  Disposition:   Home    Planned Readmission:  None     Discharge Statement:  I spent > 30 minutes discharging the patient  This time was spent on the day of discharge  I had direct contact with the patient on the day of discharge  Greater than 50% of the total time was spent examining patient, answering all patient questions, arranging and discussing plan of care with patient as well as directly providing post-discharge instructions  Additional time then spent on discharge activities  Discharge Medications:  See after visit summary for reconciled discharge medications provided to patient and/or family        **Please Note: This note may have been constructed using a voice recognition system**

## 2022-08-31 NOTE — DISCHARGE INSTR - AVS FIRST PAGE
Liver Mass:  Follow-up with gastroenterology to review liver biopsy results     Pancreatitis:   Low fat diet   Avoid alcohol

## 2022-08-31 NOTE — UTILIZATION REVIEW
Continued Stay Review    Date: 08-31-22                        Current Patient Class: inpatient   Current Level of Care: m/s    HPI:62 y o  male initially admitted on 08-27-22    Assessment/Plan:   IR consult 08-31-22   Specimens: 18 gauge core biopsy x 4  Findings: Successful US guided liver core biopsy of the liver mass  band aid to right lateral abdomen clean dry and intact  Pt on 4 hour bedrest until 1800  Assessment/Recommendation:   Liver mass seen on CT and MR 3 6 cm Segment 8 suspicious for FNH or cholangiocarcinoma    US biopsy 08-31-22     Vital Signs:   Date/Time Temp Pulse Resp BP MAP (mmHg) SpO2 O2 Device Patient Position - Orthostatic VS   08/31/22 09:11:43 97 5 °F (36 4 °C) 65 -- 157/85 109 100 % -- --   08/31/22 05:38:43 98 2 °F (36 8 °C) 68 18 164/88 113 99 % -- --   08/31/22 00:11:37 98 °F (36 7 °C) 68 16 160/90 113 99 % -- --   08/30/22 19:58:10 97 9 °F (36 6 °C) 66 18 160/91 114 99 % -- --   08/30/22 15:52:58 97 9 °F (36 6 °C) 59 18 134/70 91 97 % -- --   08/30/22 11:59:53 98 °F (36 7 °C) 66 18 166/85 112 99 % --        Pertinent Labs/Diagnostic Results:   Results from last 7 days   Lab Units 08/27/22  1735   SARS-COV-2  Negative     Results from last 7 days   Lab Units 08/30/22  0530 08/29/22  0436 08/28/22  0525 08/27/22  1333   WBC Thousand/uL 7 53 7 89 12 42* 15 00*   HEMOGLOBIN g/dL 12 1 11 5* 12 7 14 6   HEMATOCRIT % 37 2 35 5* 38 3 45 0   PLATELETS Thousands/uL 151 116* 132* 188   NEUTROS ABS Thousands/µL 3 76  --  7 24 9 98*         Results from last 7 days   Lab Units 08/31/22  0541 08/30/22  0530 08/29/22  0436 08/28/22  0525 08/27/22  1333   SODIUM mmol/L 141 139 140 138 136   POTASSIUM mmol/L 4 3 3 9 3 6 4 2 3 6   CHLORIDE mmol/L 103 103 103 102 97   CO2 mmol/L 29 28 28 29 28   ANION GAP mmol/L 9 8 9 7 11   BUN mg/dL 14 13 13 11 11   CREATININE mg/dL 1 07 0 84 0 93 0 99 1 12   EGFR ml/min/1 73sq m 74 94 88 81 70   CALCIUM mg/dL 8 6 8 7 8 3 8 1* 8 7   MAGNESIUM mg/dL 1 8 1 8  --   -- --    PHOSPHORUS mg/dL  --  4 4*  --   --   --      Results from last 7 days   Lab Units 08/31/22  0541 08/30/22  0530 08/29/22  0436 08/28/22  0525 08/27/22  1333   AST U/L 206* 183* 154* 164* 239*   ALT U/L 243* 219* 200* 218* 311*   ALK PHOS U/L 104 99 87 90 120*   TOTAL PROTEIN g/dL 7 4 6 9 6 7 6 7 8 4   ALBUMIN g/dL 2 9* 2 8* 2 8* 2 8* 3 6   TOTAL BILIRUBIN mg/dL 0 75 0 74 1 54* 2 48* 1 98*         Results from last 7 days   Lab Units 08/31/22  0541 08/30/22  0530 08/29/22  0436 08/28/22  0525 08/27/22  1333   GLUCOSE RANDOM mg/dL 111 109 100 93 111     Results from last 7 days   Lab Units 08/29/22  0436 08/28/22  0525   PROTIME seconds 15 7* 15 6*   INR  1 24* 1 22*   PTT seconds 30  --      Results from last 7 days   Lab Units 08/29/22  0436   HEP B S AG  Non-reactive   HEP C AB  High Reactive*   HEP B C IGM  Non-reactive   HEP B C TOTAL AB  Non-reactive     Results from last 7 days   Lab Units 08/31/22  0541 08/30/22  0530 08/28/22  0525   LIPASE u/L 2,712* 651* 1,988*       Results from last 7 days   Lab Units 08/27/22  1434   CLARITY UA  Clear   COLOR UA  Concepcion   SPEC GRAV UA  1 025   PH UA  5 5   GLUCOSE UA mg/dl Negative   KETONES UA mg/dl Trace*   BLOOD UA  Moderate*   PROTEIN UA mg/dl 100 (2+)*   NITRITE UA  Positive*   BILIRUBIN UA  Moderate*   UROBILINOGEN UA E U /dl 4 0*   LEUKOCYTES UA  Negative   WBC UA /hpf 0-1   RBC UA /hpf 2-4   BACTERIA UA /hpf Moderate*   EPITHELIAL CELLS WET PREP /hpf None Seen     Results from last 7 days   Lab Units 08/27/22  1735   INFLUENZA A PCR  Negative   INFLUENZA B PCR  Negative   RSV PCR  Negative     Results from last 7 days   Lab Units 08/27/22  1434   URINE CULTURE  No Growth <1000 cfu/mL                 Medications:   Scheduled Medications:  chlordiazePOXIDE, 10 mg, Oral, [id number] ZULEYKA  folic acid, 1 mg, Oral, Daily  multivitamin-minerals, 1 tablet, Oral, Daily  nicotine, 1 patch, Transdermal, Daily  pantoprazole, 40 mg, Oral, Early Morning  thiamine, 100 mg, Oral, Daily      Continuous IV Infusions:     PRN Meds:  acetaminophen, 650 mg, Oral, Q6H PRN  LORazepam, 0 5 mg, Oral, Q8H PRN  morphine injection, 1 mg, Intravenous, Q6H PRN  nicotine polacrilex, 2 mg, Oral, Q2H PRN  ondansetron, 4 mg, Intravenous, Q6H PRN  oxyCODONE, 2 5 mg, Oral, Q6H PRN        Discharge Plan: d    Network Utilization Review Department  ATTENTION: Please call with any questions or concerns to [phone] and carefully listen to the prompts so that you are directed to the right person  All voicemails are confidential   [facility] all requests for admission clinical reviews, approved or denied determinations and any other requests to dedicated fax number below belonging to the campus where the patient is receiving treatment   List of dedicated fax numbers for the Facilities:  [address] DENIALS (Administrative/Medical Necessity) [phone]   [address] (Maternity/NICU/Pediatrics) [phone]   [address] [place] [address] [address] [place] [address]  [address] [facility] [address] [address] [place] [address] Traci Cheko [address] [facility] [place] [address] [phone]

## 2022-08-31 NOTE — SEDATION DOCUMENTATION
Procedure ended, pt tolerated without incident, vss, pt slightly drowsy but easily arousable  Vss, band aid to right lateral abdomen clean dry and intact  Pt on 4 hour bedrest until 1800

## 2022-08-31 NOTE — BRIEF OP NOTE (RAD/CATH)
INTERVENTIONAL RADIOLOGY PROCEDURE NOTE    Date: 8/31/2022    Procedure: IR BIOPSY LIVER MASS     Preoperative diagnosis:   1  Liver mass, right lobe    2  LFT elevation    3  Pancreatitis    4  Alcohol abuse    5  Hepatic lesion with elevated LFTs         Postoperative diagnosis: Same  Surgeon: Jessica Santos MD     Assistant: None  No qualified resident was available  Blood loss: minimal    Specimens: 18 gauge core biopsy x 4    Findings: Successful US guided liver core biopsy of the liver mass  Complications: None immediate      Anesthesia: conscious sedation

## 2022-08-31 NOTE — PROGRESS NOTES
[facility]  Harsh American 65 y o  male MRN: [id number]    Unit/Bed#: [address] [place] [address] A Encounter: [id number]      Assessment/Plan:  1  Acute on chronic pancreatitis  65year-old with significant daily alcohol use for the past 6 years presented with epigastric pain found to have elevated lipase of 4000  CT scan without acute pancreatitis however calcifications noted in keeping with chronic pancreatitis   Possible cholelithiasis on ultrasound   AST and ALT remain elevated and Alk-phos and total bilirubin within normal limits on 8/30  BUN and creatinine within normal limits  No leukocytosis, white blood count 7 53 on 08/30     Reports bowel movements are usually regular  Denies any recent unintentional weight loss   Denies prior known history of pancreatitis   Chronic pancreatitis most likely due to alcohol use       -Low fat diet as tolerated  -supportive care with pain medication and antiemetics as needed   -discussed the importance of complete alcohol cessation   Explained to patient consequences of continued alcohol use in relationship to pancreatitis   Patient reported he will stop consuming alcohol   -recommend primary team providing resources to help with alcohol cessation   - MRI  done 8/29 showed normal MRCP without evidence of choledocholithiasis  Cholelithiasis  Questionable minimal edematous pancreatic parenchyma which could be manifestation of recent acute intestinal edematous pancreatitis  No significant peripancreatic fluid collection or evidence of pancreatic necrosis   Mild maris hepatis adenopathy, nonspecific   Metastatic disease cannot be excluded although the possibility of reactive adenopathy is possible in the setting of recent acute pancreatitis      2  Hepatic lesion with elevated LFTs  Patient with new finding of 4 cm hepatic lesion noted suspicious for malignancy possibly primary or metastatic disease   Patient reports prior to this episode he was feeling well with no decreased appetite, unintentional weight loss or early satiety   He has chronic daily alcohol use for over 6 years   Patient also smokes a pack a day since the age of 16  No history of hepatitis        -MRI done 8/29 showed 3 6 cm indeterminate solid mass segment 8 right hepatic lobe suspicious for malignancy  Leading differential etiologies include fibrolamellar hepatocellular carcinoma or possibility intrahepatic cholangiocarcinoma  Biopsy is recommended  Background hepatic steatosis  7 mm arterial enhancing lesion in segment 3 left hepatic lobe with features most suggestive of flash enhancement of cavernous hemangioma  Attention on three-month follow-up advised  -hepatitis panel positive for hepatitis C  Hepatitis C genotype and viral load pending  Patient aware that if hepatitis C genotype and viral load is positive he will need treatment for hepatitis-C  -AFP 8 5  -For liver biopsy today  Okay to discharge from GI standpoint after liver biopsy  Subjective:   Lying in bed in no acute distress  Denies nausea or vomiting  Denies epigastric or abdominal pain  Patient states he wants to go home  Objective:     Vitals: Blood pressure 148/81, pulse 67, temperature 97 5 °F (36 4 °C), resp  rate 16, height 5' 7" (1 702 m), weight 68 kg (150 lb), SpO2 100 %  ,Body mass index is 23 49 kg/m²  No intake or output data in the 24 hours ending 08/31/22 1352    Physical Exam: Physical Exam  Vitals and nursing note reviewed  Constitutional:       General: He is not in acute distress  Appearance: He is not ill-appearing  Cardiovascular:      Rate and Rhythm: Normal rate and regular rhythm  Pulses: Normal pulses  Heart sounds: Normal heart sounds  Pulmonary:      Effort: Pulmonary effort is normal  No respiratory distress  Breath sounds: Normal breath sounds  No stridor  No wheezing, rhonchi or rales  Abdominal:      General: Bowel sounds are normal  There is no distension        Palpations: Abdomen is soft  There is no mass  Tenderness: There is no abdominal tenderness  There is no guarding or rebound  Hernia: No hernia is present  Musculoskeletal:      Right lower leg: No edema  Left lower leg: No edema  Skin:     General: Skin is warm and dry  Capillary Refill: Capillary refill takes less than 2 seconds  Coloration: Skin is not jaundiced or pale  Neurological:      Mental Status: He is alert and oriented to person, place, and time     Psychiatric:         Mood and Affect: Mood normal          Invasive Devices  Report    Peripheral Intravenous Line  Duration           Peripheral IV 08/27/22 Left Antecubital 4 days                Current Facility-Administered Medications:     acetaminophen (TYLENOL) tablet 650 mg, 650 mg, Oral, Q6H PRN, Eber Griffith MD    chlordiazePOXIDE (LIBRIUM) capsule 10 mg, 10 mg, Oral, Q12H [facility], DAGOBERTO Pablo, 10 mg at 08/31/22 6488    fentanyl citrate (PF) 100 MCG/2ML, , Intravenous, Code/Trauma/Sedation Med, Sylvie Ponce MD, 50 mcg at 10/31/12 3864    folic acid (FOLVITE) tablet 1 mg, 1 mg, Oral, Daily, Eber Griffith MD, 1 mg at 08/31/22 5485    LORazepam (ATIVAN) tablet 0 5 mg, 0 5 mg, Oral, Q8H PRN, DAGOBERTO Pablo    midazolam (VERSED) injection, , , Code/Trauma/Sedation Med, Sylvie Ponce MD, 1 mg at 08/31/22 1334    morphine injection 1 mg, 1 mg, Intravenous, Q6H PRN, Zahraa Rivera PA-C    multivitamin-minerals (CENTRUM) tablet 1 tablet, 1 tablet, Oral, Daily, Eber Grififth MD, 1 tablet at 08/31/22 0939    nicotine (NICODERM CQ) 21 mg/24 hr TD 24 hr patch 1 patch, 1 patch, Transdermal, Daily, Eber Griffith MD, 1 patch at 08/31/22 2382    nicotine polacrilex (NICORETTE) gum 2 mg, 2 mg, Oral, Q2H PRN, DAGOBERTO Pablo    ondansetron [facility]) injection 4 mg, 4 mg, Intravenous, Q6H PRN, Eber Griffith MD    oxyCODONE (ROXICODONE) IR tablet 2 5 mg, 2 5 mg, Oral, Q6H PRN, Mary Ann Ag DIONNE Rivera    pantoprazole (PROTONIX) EC tablet 40 mg, 40 mg, Oral, Early Morning, Zahraa Rivera DIONNE, 40 mg at 08/31/22 0537    thiamine tablet 100 mg, 100 mg, Oral, Daily, Zahraa Rivera PA-YARON, 100 mg at 08/31/22 6930    Lab Results:   Recent Results (from the past 24 hour(s))   Comprehensive metabolic panel    Collection Time: 08/31/22  5:41 AM   Result Value Ref Range    Sodium 141 135 - 147 mmol/L    Potassium 4 3 3 5 - 5 3 mmol/L    Chloride 103 96 - 108 mmol/L    CO2 29 21 - 32 mmol/L    ANION GAP 9 4 - 13 mmol/L    BUN 14 5 - 25 mg/dL    Creatinine 1 07 0 60 - 1 30 mg/dL    Glucose 111 65 - 140 mg/dL    Calcium 8 6 8 3 - 10 1 mg/dL    Corrected Calcium 9 5 8 3 - 10 1 mg/dL     (H) 5 - 45 U/L     (H) 12 - 78 U/L    Alkaline Phosphatase 104 46 - 116 U/L    Total Protein 7 4 6 4 - 8 4 g/dL    Albumin 2 9 (L) 3 5 - 5 0 g/dL    Total Bilirubin 0 75 0 20 - 1 00 mg/dL    eGFR 74 ml/min/1 73sq m   Magnesium    Collection Time: 08/31/22  5:41 AM   Result Value Ref Range    Magnesium 1 8 1 6 - 2 6 mg/dL   Lipase    Collection Time: 08/31/22  5:41 AM   Result Value Ref Range    Lipase 2,712 (H) 73 - 393 u/L             Imaging Studies: MRI abdomen w wo contrast and mrcp    Result Date: 8/29/2022  Narrative: MRI OF THE ABDOMEN WITH AND WITHOUT CONTRAST WITH MRCP INDICATION: Liver lesion on ct, pancreatitis with elevated LFT, r/o choledocholithiasis and classify liver lesion  COMPARISON: CT abdomen pelvis and right upper quadrant ultrasound 8/27/2022 TECHNIQUE:  The following pulse sequences were obtained:  Coronal and axial T2 with TE of 90 and 180 respectively, axial T2 with fat saturation, axial FIESTA fat-sat, axial T1-weighted in-and-out-of phase, axial DWI/ADC, pre-contrast axial T1 with fat saturation, post-contrast dynamic axial T1 with fat saturation at 20, 70, and 180 seconds, followed by coronal and 7 minute delayed axial T1 with fat saturation   3D MRCP images were obtained with radial thick slabs and projections  3D rendering was performed from the acquisition scanner  Pre- and postcontrast subtraction images were also obtained  IV Contrast:  7 mL of Gadobutrol injection (SINGLE-DOSE) FINDINGS: LOWER CHEST:   Unremarkable  LIVER: Normal in size and configuration  There is loss of background signal on out of phase imaging compatible with fatty infiltration  In segment 8 of the right hepatic lobe there is a 3 6 x 3 1 x 3 6 cm mass which is iso to low T1 signal and predominantly T2 hyperintense with some low signal posteriorly  This appears to demonstrate minimal heterogeneous enhancement on arterial phase imaging with more diffuse internal enhancement seen on portal venous imaging  A rim of capsular enhancement is also present best depicted on delayed imaging  There is some central diffusion restriction  Some microscopic fat may be present within the central mass as well  No discernible capsular retraction or regional intrahepatic biliary dilatation  7 x 5 mm arterial enhancing nodule within the lateral segment left lobe (segment 3) which demonstrates T1 hypointensity and T2 hyperintensity  This remains visible on all postcontrast sequences including delayed imaging  Long TE T2 signal is higher than that of spleen and there is no diffusion restriction on ADC map  This is favored to represent flash enhancement of hemangioma  The hepatic veins and portal veins are patent  BILE DUCTS:  No intrahepatic bile duct dilation  Common bile duct is normal in caliber  No choledocholithiasis, biliary stricture or suspicious mass  GALLBLADDER:  Cholelithiasis without pericholecystic edema  PANCREAS:  Question minimally edematous parenchyma (neck/proximal body) which could be manifestation of recent acute interstitial edematous pancreatitis  No significant peripancreatic fluid collection or evidence of pancreatic necrosis  Tiny calcification seen in the pancreatic head on CT is not clearly visualized on MRI   The pancreatic duct is normal caliber  No evidence of pancreatic divisum  ADRENAL GLANDS:  Normal  SPLEEN:  Normal  KIDNEYS/PROXIMAL URETERS:  No hydroureteronephrosis  No suspicious renal mass  BOWEL:   No dilated loops of bowel  PERITONEUM/RETROPERITONEUM:  No ascites  LYMPH NODES:  12 mm portacaval lymph node series 13/192   5 mm short axis lymph node posterior to the left hepatic lobe above the pancreas  VASCULAR STRUCTURES:  No aneurysm  ABDOMINAL WALL:  Unremarkable  OSSEOUS STRUCTURES:  No suspicious osseous lesion  Degenerative endplate changes seen at the L4-5 level  Impression: 1   3 6 cm indeterminate solid mass segment 8 right hepatic lobe suspicious for malignancy  Leading differential etiologies include fibrolamellar hepatocellular carcinoma (favored) or possibly intrahepatic cholangiocarcinoma  Biopsy is recommended  Background hepatic steatosis  2  Mild maris hepatis adenopathy, nonspecific  Metastatic disease cannot be excluded although the possibility of reactive adenopathy is possible in the setting of recent acute pancreatitis  3  7 mm arterial enhancing lesion in segment 3 left hepatic lobe with features most suggestive of flash enhancement of cavernous hemangioma  Attention on three-month follow-up advised  4  Question minimally edematous pancreatic parenchyma (neck/proximal body) which could be manifestation of recent acute interstitial edematous pancreatitis  No significant peripancreatic fluid collection or evidence of pancreatic necrosis  5  Normal MRCP without evidence of choledocholithiasis  6  Cholelithiasis  The study was marked in [facility] for immediate notification and follow-up  Workstation performed: [id number]     US right upper quadrant    Result Date: 8/27/2022  Narrative: RIGHT UPPER QUADRANT ULTRASOUND INDICATION:     epigastric pain, vomiting after eating beginning thursday   COMPARISON:  None TECHNIQUE:   Real-time ultrasound of the right upper quadrant was performed with a curvilinear transducer with both volumetric sweeps and still imaging techniques  FINDINGS: PANCREAS:  Portions of the pancreas are obscured by bowel gas  Visualized portions of the pancreas are unremarkable  AORTA AND IVC:  Visualized portions are normal for patient age  LIVER: Size:  Within normal range  The liver measures 16 3 cm in the midclavicular line  Contour:  Surface contour is smooth  Parenchyma: There is moderate diffuse increased echogenicity with smooth echotexture and acoustic beam attenuation  Most consistent with moderate hepatic steatosis  There is a hypoechoic mass in the right lobe of the liver measuring 3 7 x 3 2 x 3 7 cm  Limited imaging of the main portal vein shows it to be patent and hepatopetal  There are enlarged jeana hepatis nodes and perihepatic lymph nodes as well BILIARY: There is thickened gallbladder wall measuring 5 mm  Gallbladder is contracted  Echogenic non shadowing foci are demonstrated in the gallbladder  Due to the contracted gallbladder is difficult to determine if they represent small polyps or calculi  There is no pericholecystic fluid  Tamar Capuchin sign is negative  No intrahepatic biliary dilatation  CBD measures 4 0 mm  No choledocholithiasis  KIDNEY: Right kidney measures 11 4 x 6 2 x 7 1 cm  Volume 264 8 mL Kidney within normal limits  ASCITES:   None  Impression: 1  Hepatic steatosis with the mass in the right lobe of the liver  Additional evaluation will be necessary to determine the nature of the mass  Jeana hepatis and perihepatic lymph node enlargement  Malignancy is a diagnosis of exclusion  Evaluation with contrast-enhanced CT or MRI abdomen should be obtained  2   Contracted gallbladder with thickened wall and echogenic foci within it  Findings are not typical for acute cholecystitis  Gallbladder could also be better evaluated on the follow-up CT or MRI exam  The study was marked in [facility] for immediate notification   Workstation performed: [id number]     CT abdomen pelvis with contrast    Result Date: 8/27/2022  Narrative: CT ABDOMEN AND PELVIS WITH IV CONTRAST INDICATION:   further evaluate US findings in liver  COMPARISON:  Right upper quadrant ultrasound from earlier today TECHNIQUE:  CT examination of the abdomen and pelvis was performed  Axial, sagittal, and coronal 2D reformatted images were created from the source data and submitted for interpretation  Radiation dose length product (DLP) for this visit:  1000 6 mGy-cm   This examination, like all CT scans performed in the [facility], was performed utilizing techniques to minimize radiation dose exposure, including the use of iterative  reconstruction and automated exposure control  IV Contrast:  65 mL of iohexol (OMNIPAQUE) Enteric Contrast:  Enteric contrast was not administered  FINDINGS: ABDOMEN LOWER CHEST:  No clinically significant abnormality identified in the visualized lower chest  LIVER/BILIARY TREE:  4 0 cm lesion in segment 8 of the liver central arterial phase and capsular enhancement with portal venous and delayed phase capsular enhancement with relative central washout suspicious for malignancy  No other similar liver lesions  No areas of hepatic vascular thrombosis  Hepatic steatosis is better appreciated on the ultrasound  GALLBLADDER:  Nondistended gallbladder with dependent gallstone  No pericholecystic inflammatory changes  SPLEEN:  Unremarkable  PANCREAS:  Single pancreatic head parenchymal calcifications in keeping with chronic pancreatitis  ADRENAL GLANDS:  Unremarkable  KIDNEYS/URETERS:  No hydronephrosis  Multiple left renal cortical scars  STOMACH AND BOWEL:  Unremarkable  APPENDIX:  No findings to suggest appendicitis  ABDOMINOPELVIC CAVITY:  No ascites  No pneumoperitoneum  Jeana hepatic node measuring 13 mm in short axis #3/22  VESSELS:  Unremarkable for patient's age  PELVIS REPRODUCTIVE ORGANS:  Unremarkable for patient's age   URINARY BLADDER:  Unremarkable  ABDOMINAL WALL/INGUINAL REGIONS:  Unremarkable  OSSEOUS STRUCTURES:  No acute fracture or destructive osseous lesion  Impression: 4 cm segment 8 hepatic lesion suspicious for malignancy, likely primary malignancy or metastatic disease  13 mm maris hepatic node  The study was marked in [facility] for immediate notification  Workstation performed: [id number]           DAGOBERTO Ho      Please Note: "This note has been constructed using a voice recognition system  Therefore there may be syntax, spelling, and/or grammatical errors   Please call if you have any questions  "**

## 2022-08-31 NOTE — PLAN OF CARE
Problem: GASTROINTESTINAL - ADULT  Goal: Minimal or absence of nausea and/or vomiting  Description: INTERVENTIONS:  - Administer IV fluids if ordered to ensure adequate hydration  - Maintain NPO status until nausea and vomiting are resolved  - Nasogastric tube if ordered  - Administer ordered antiemetic medications as needed  - Provide nonpharmacologic comfort measures as appropriate  - Advance diet as tolerated, if ordered  - Consider nutrition services referral to assist patient with adequate nutrition and appropriate food choices  Outcome: Progressing     Problem: GASTROINTESTINAL - ADULT  Goal: Maintains or returns to baseline bowel function  Description: INTERVENTIONS:  - Assess bowel function  - Encourage oral fluids to ensure adequate hydration  - Administer IV fluids if ordered to ensure adequate hydration  - Administer ordered medications as needed  - Encourage mobilization and activity  - Consider nutritional services referral to assist patient with adequate nutrition and appropriate food choices  Outcome: Progressing     Problem: Nutrition/Hydration-ADULT  Goal: Nutrient/Hydration intake appropriate for improving, restoring or maintaining nutritional needs  Description: Monitor and assess patient's nutrition/hydration status for malnutrition  Collaborate with interdisciplinary team and initiate plan and interventions as ordered  Monitor patient's weight and dietary intake as ordered or per policy  Utilize nutrition screening tool and intervene as necessary  Determine patient's food preferences and provide high-protein, high-caloric foods as appropriate       INTERVENTIONS:  - Monitor oral intake, urinary output, labs, and treatment plans  - Assess nutrition and hydration status and recommend course of action  - Evaluate amount of meals eaten  - Assist patient with eating if necessary   - Allow adequate time for meals  - Recommend/ encourage appropriate diets, oral nutritional supplements, and vitamin/mineral supplements  - Order, calculate, and assess calorie counts as needed  - Recommend, monitor, and adjust tube feedings and TPN/PPN based on assessed needs  - Assess need for intravenous fluids  - Provide specific nutrition/hydration education as appropriate  - Include patient/family/caregiver in decisions related to nutrition  Outcome: Progressing

## 2022-09-01 LAB
HCV RNA SERPL NAA+PROBE-ACNC: NORMAL IU/ML
HCV RNA SERPL NAA+PROBE-LOG IU: 6.14 LOG10 IU/ML
TEST INFORMATION: NORMAL

## 2022-09-06 ENCOUNTER — DOCUMENTATION (OUTPATIENT)
Dept: OTHER | Facility: HOSPITAL | Age: 62
End: 2022-09-06

## 2022-09-06 PROCEDURE — 88313 SPECIAL STAINS GROUP 2: CPT | Performed by: SPECIALIST

## 2022-09-06 PROCEDURE — 88341 IMHCHEM/IMCYTCHM EA ADD ANTB: CPT | Performed by: SPECIALIST

## 2022-09-06 PROCEDURE — 88342 IMHCHEM/IMCYTCHM 1ST ANTB: CPT | Performed by: SPECIALIST

## 2022-09-06 PROCEDURE — 88333 PATH CONSLTJ SURG CYTO XM 1: CPT | Performed by: SPECIALIST

## 2022-09-06 PROCEDURE — 88307 TISSUE EXAM BY PATHOLOGIST: CPT | Performed by: SPECIALIST

## 2022-09-09 ENCOUNTER — OFFICE VISIT (OUTPATIENT)
Dept: FAMILY MEDICINE CLINIC | Facility: CLINIC | Age: 62
End: 2022-09-09

## 2022-09-09 VITALS
SYSTOLIC BLOOD PRESSURE: 148 MMHG | TEMPERATURE: 98.6 F | DIASTOLIC BLOOD PRESSURE: 83 MMHG | RESPIRATION RATE: 18 BRPM | OXYGEN SATURATION: 99 % | HEIGHT: 67 IN | BODY MASS INDEX: 24.14 KG/M2 | HEART RATE: 80 BPM | WEIGHT: 153.8 LBS

## 2022-09-09 DIAGNOSIS — K85.90 ACUTE PANCREATITIS, UNSPECIFIED COMPLICATION STATUS, UNSPECIFIED PANCREATITIS TYPE: ICD-10-CM

## 2022-09-09 DIAGNOSIS — F17.219 CIGARETTE NICOTINE DEPENDENCE WITH NICOTINE-INDUCED DISORDER: ICD-10-CM

## 2022-09-09 DIAGNOSIS — C22.0 HEPATOCELLULAR CARCINOMA (HCC): Primary | ICD-10-CM

## 2022-09-09 DIAGNOSIS — F10.10 ALCOHOL ABUSE: ICD-10-CM

## 2022-09-09 DIAGNOSIS — B19.20 HEPATITIS C VIRUS INFECTION WITHOUT HEPATIC COMA, UNSPECIFIED CHRONICITY: ICD-10-CM

## 2022-09-09 LAB
HCV GENTYP SERPL NAA+PROBE: NORMAL
HCV PLEASE NOTE: NORMAL

## 2022-09-09 PROCEDURE — 99203 OFFICE O/P NEW LOW 30 MIN: CPT | Performed by: FAMILY MEDICINE

## 2022-09-09 RX ORDER — LANOLIN ALCOHOL/MO/W.PET/CERES
100 CREAM (GRAM) TOPICAL DAILY
Qty: 30 TABLET | Refills: 2 | Status: SHIPPED | OUTPATIENT
Start: 2022-09-09 | End: 2022-09-19

## 2022-09-09 RX ORDER — FOLIC ACID 1 MG/1
1 TABLET ORAL DAILY
Qty: 30 TABLET | Refills: 2 | Status: SHIPPED | OUTPATIENT
Start: 2022-09-09 | End: 2022-09-19

## 2022-09-09 RX ORDER — NICOTINE 21 MG/24HR
1 PATCH, TRANSDERMAL 24 HOURS TRANSDERMAL DAILY
Qty: 28 PATCH | Refills: 0 | Status: SHIPPED | OUTPATIENT
Start: 2022-09-09 | End: 2022-09-19

## 2022-09-09 NOTE — ASSESSMENT & PLAN NOTE
Resolved   Initially presented to the hospital with epigastric and right upper quadrant pain for 3 days associated with nausea and nonbloody nonbilious vomiting  Lipase elevated  Improved  With IVF and bowel rest, diet advanced to low-fat diet  - Encourage alcohol nicotine cessation  - continue nutrition recs   - Should follow-up with GI

## 2022-09-09 NOTE — PATIENT INSTRUCTIONS
How to Stop Smoking   WHAT YOU NEED TO KNOW:   You will improve your health and the health of others around you if you stop smoking  Your risk for heart and lung disease, cancer, stroke, heart attack, and vision problems will also decrease  Your adolescent can help prevent or stop harm to his or her brain or body  This will help him or her become a healthy adult  You can benefit from quitting no matter how long you have smoked  DISCHARGE INSTRUCTIONS:   Prepare to stop smoking:  Nicotine is a highly addictive drug found in cigarettes  Withdrawal symptoms can happen when you stop smoking and make it hard to quit  These include anxiety, depression, irritability, trouble sleeping, and increased appetite  You increase your chances of success if you prepare to quit  Set a quit date  Hollis Nim a date that is within the next 2 weeks  Do not pick a day that you think may be stressful or busy  Write down the day or [place] it on your calender  Tell friends and family that you plan to quit  Explain that you may have withdrawal symptoms when you try to quit  Ask them to support you  They may be able to encourage you and help reduce your stress to make it easier for you to quit  Make a list of your reasons for quitting  Put the list somewhere you will see it every day, such as your refrigerator  You can look at the list when you have a craving  Remove all tobacco and nicotine products from your home, car, and workplace  Also, remove anything else that will tempt you to smoke, such as lighters, matches, or ashtrays  Clean your car, home, and places at work that smell like smoke  The smell of smoke can trigger a craving  Identify triggers that make you want to smoke  This may include activities, feelings, or people  Also write down 1 way you can deal with each of your triggers  For example, if you want to smoke as soon as you wake up, plan another activity during this time, such as exercise      Make a plan for how you will quit  Learn about the tools that can help you quit, such as medicine, counseling, or nicotine replacement therapy  Choose at least 2 options to help you quit  Help your adolescent make a plan to quit  The plan will be more successful if your adolescent makes his or her own decisions  Do not try to pressure him or her to quit immediately or in a certain way  Be supportive and offer help if needed  Tools to help you stop smoking:   Counseling  from a trained healthcare provider can provide you with support and skills to quit smoking  The provider will also teach you to manage your withdrawal symptoms and cravings  You may receive counseling from one counselor, in group therapy, or through phone therapy called a quit line  Nicotine replacement therapy (NRT)  such as nicotine patches, gum, or lozenges may help reduce your nicotine cravings  You may get these without a doctor's order  Do not use e-cigarettes or smokeless tobacco in place of cigarettes or to help you quit  They still contain nicotine  Prescription medicines  such as nasal sprays or nicotine inhalers may help reduce your withdrawal symptoms  Other medicines may also be used to reduce your urge to smoke  Ask your healthcare provider about these medicines  You may need to start certain medicines 2 weeks before your quit date for them to work well  Hypnosis  is a practice that helps guide you through thoughts and feelings  Hypnosis may help decrease your cravings and make you more willing to quit  Acupuncture therapy  uses very thin needles to balance energy channels in the body  This is thought to help decrease cravings and symptoms of nicotine withdrawal     Support groups  let you talk to others who are trying to quit or have already quit  It may be helpful to speak with others about how they quit  Manage your cravings:   Avoid situations, people, and places that tempt you to smoke    Go to nonsmoking places, such as libraries or restaurants  Understand what tempts you and try to avoid these things  Keep your hands busy  Hold things such as a stress ball or pen  Put candy or toothpicks in your mouth  Keep lollipops, sugarless gum, or toothpicks with you at all times  Do not have alcohol or caffeine  These drinks may tempt you to smoke  Drink healthy liquids such as water or juice instead  Reward yourself when you resist your cravings  Rewards will motivate you and help you stay positive  Do an activity that distracts you from your craving  Examples include cleaning, creating art, or gardening  Prevent weight gain after you quit:  You may gain a few pounds after you quit smoking  It is healthier for you to gain a few pounds than to continue to smoke  The following can help you prevent weight gain:  Eat a variety of healthy foods  Healthy foods include fruits, vegetables, whole-grain breads, low-fat dairy products, beans, lean meats, and fish  Eat healthy snacks, such as low-fat yogurt, if you get hungry between meals  Drink water before, during, and between meals  This will make your stomach feel full and help prevent you from overeating  Ask your healthcare provider how much liquid to drink each day and which liquids are best for you  Be physically active  Activity may help reduce your cravings and reduce stress  Take a walk or do some kind of physical activity every day  Ask your healthcare provider which activities are right for you  For support and more information:   [employer]  [address] [place] [address],[address]  [address]  Phone: [facility] 080  Phone: 4- 667 - 345-3159  Web Address: Sherryle SumAll  cynthia    Smokefree  gov  Phone: 9- 418 - 369-9244  Web Address: www smokefree  [facility] 21 2022 Information is for End User's use only and may not be sold, redistributed or otherwise used for commercial purposes   All illustrations and images included in CareNotes® are the copyrighted property of [employer]  or Yadira Tinoco   The above information is an  only  It is not intended as medical advice for individual conditions or treatments  Talk to your doctor, nurse or pharmacist before following any medical regimen to see if it is safe and effective for you

## 2022-09-09 NOTE — ASSESSMENT & PLAN NOTE
Prior to hospitalization smoke 1 PPD  Currently pt reports smoking only 1 cigarette throughout the day   Was discharged on nicotine patch and gum however was not able to afford it  Pt reports nicotine patch being very effective and ridding of craving completely  - will reorder nicotine patch 21 mg qd to new pharmacy (pt wanted one closer to home) as pt states he just got his paycheck and can afford it  - education provided on NRT  Will use 21 mg patch for about 6 weeks then plan to taper down to 14 mg then 7 mg until eventually off  Encouraged to stop smoking completely during NRT  - Pt is very motivated to quit at this time due to the new health issues   - social work consult due to financial difficulties and no insurance, not being able to afford patches and medications  - will schedule annual physical in 1 month   Can f/u at that time

## 2022-09-09 NOTE — PROGRESS NOTES
Assessment/Plan:    Nicotine dependence  Prior to hospitalization smoke 1 PPD  Currently pt reports smoking only 1 cigarette throughout the day   Was discharged on nicotine patch and gum however was not able to afford it  Pt reports nicotine patch being very effective and ridding of craving completely  - will reorder nicotine patch 21 mg qd to new pharmacy (pt wanted one closer to home) as pt states he just got his paycheck and can afford it  - education provided on NRT  Will use 21 mg patch for about 6 weeks then plan to taper down to 14 mg then 7 mg until eventually off  Encouraged to stop smoking completely during NRT  - Pt is very motivated to quit at this time due to the new health issues   - social work consult due to financial difficulties and no insurance, not being able to afford patches and medications  - will schedule annual physical in 1 month  Can f/u at that time    Acute pancreatitis  Resolved   Initially presented to the hospital with epigastric and right upper quadrant pain for 3 days associated with nausea and nonbloody nonbilious vomiting  Lipase elevated  Improved  With IVF and bowel rest, diet advanced to low-fat diet  - Encourage alcohol nicotine cessation  - continue nutrition recs   - Should follow-up with GI    Alcohol abuse  Hx drinking 3-4 beers on daily basis, last drink was 8/25/2022  Pt reports stopped drinking since discharge except for 1 beer   -  Continue to encourage alcohol cessation  -  Discharged on folic acid and thiamine however patient has been unable to afford medications  -  Sent medications to new pharmacy closer to patient  If he can afford them  -  Social work consult for the above    Hepatocellular carcinoma ([facility] [place] [address] )  RUQ US with suspicious mass in R lobe of liver   CT A/P 8/27 showed "4 cm segment 8 hepatic lesion suspicious for malignancy, likely primary malignancy or metastatic disease "  MRCP 8/29 showed 3 6 cm mass suspicious for FNH or cholangiocarcinoma  S/p US guided biopsy with IR on 8/31  Tissue exam: Liver, Liver mass, right lobe Hepatocellular carcinoma  - discussed with pt today  He reports he was already told it was cancer  - highly motivated for alcohol and nicotine cessation   - Pt did not make GI appointment because he did not realize he had to after making PCP appointment  He will call the number provided to him by the hospital for appt asap next week  - also placed stat GI referral and message sent to referral specialist for assistance, in case he wants to go to a location closer to him in [place] rather than [place]   - regardless, he still needs GI appt asap no matter the location to discuss further management            Diagnoses and all orders for this visit:    Hepatocellular carcinoma ([facility] [address] )  -     Ambulatory Referral to Gastroenterology; Future    Alcohol abuse  -     Ambulatory Referral to Social Work Care Management Program; Future  -     folic acid (FOLVITE) 1 mg tablet; Take 1 tablet (1 mg total) by mouth daily  -     thiamine 100 MG tablet; Take 1 tablet (100 mg total) by mouth daily    Cigarette nicotine dependence with nicotine-induced disorder  -     nicotine (NICODERM CQ) 21 mg/24 hr TD 24 hr patch; Place 1 patch on the skin daily    Hepatitis C virus infection without hepatic coma, unspecified chronicity  -     Ambulatory Referral to Gastroenterology; Future    Acute pancreatitis, unspecified complication status, unspecified pancreatitis type          Subjective:      Patient ID: Gage Carl is a 65 y o  male  HPI  66 yo male new patient presenting for ED/hospital f/u  Recently admitted [facility] 8/27-8/31/22 initially due to epigastric and right upper quadrant pain for 3 days associated with nausea and nonbloody non bilious vomiting  Patient was found to have acute pancreatitis with a lipase of 4000 along with elevated LFTs  Treated with bowel rest and aggressive IV hydration  Lipase started to downtrend  Patient was started on low-fat diet  Abdominal pain has resolved  Patient was discharged with information on pancreatitis and low-fat diet  Patient was advised to avoid all alcohol and tobacco products  Additionally, during hospitalization, patient was noted to have a liver mass suspicious for malignancy  This was discussed with the patient and his brother  Patient underwent a liver biopsy on 8/31/2022  Patient tolerated procedure well  Patient was discharged home to follow-up with Gastroenterology to review biopsy results  Hx alcoholism  Smoker since 16years old  Currently smoking 1 cigarette all day to save up money and quit smoking  Takes 2 puffs then puts it out and keeps in pocket  Previously smoking 1 PPD  Pt reports using patch in hospital worked and had no craving, but he was unable to afford it when leaving hospital  He states he just got paycheck and wants to buy patch now  Stopped drinking since leaving the hospital  Had one can of beer  Hospitalized for pancreatitis  Resolved, no abdominal pain, no vomiting  Pt reports staying on diet and having regular BM's  At first BM's were very hard but now becoming more regular  No insurance, applying for [employer]  Open to speaking with social work  The following portions of the patient's history were reviewed and updated as appropriate: allergies, current medications, past family history, past medical history, past social history, past surgical history and problem list     Review of Systems   Constitutional: Negative for chills and fever  HENT: Negative for congestion and rhinorrhea  Respiratory: Negative for cough and shortness of breath  Cardiovascular: Negative for chest pain and leg swelling  Gastrointestinal: Negative for abdominal pain, constipation, diarrhea, nausea and vomiting  Musculoskeletal: Negative for myalgias  Skin: Negative for rash and wound  Neurological: Negative for dizziness, light-headedness and headaches     Psychiatric/Behavioral: Negative for agitation and confusion  Objective:    /83   Pulse 80   Temp 98 6 °F (37 °C) (Temporal)   Resp 18   Ht 5' 6 7" (1 694 m)   Wt 69 8 kg (153 lb 12 8 oz)   SpO2 99%   BMI 24 31 kg/m²        Physical Exam  Vitals reviewed  Constitutional:       General: He is not in acute distress  Appearance: He is well-developed  HENT:      Head: Normocephalic and atraumatic  Eyes:      Extraocular Movements: Extraocular movements intact  Conjunctiva/sclera: Conjunctivae normal    Cardiovascular:      Rate and Rhythm: Normal rate and regular rhythm  Heart sounds: Normal heart sounds  No murmur heard  Pulmonary:      Effort: Pulmonary effort is normal  No respiratory distress  Breath sounds: Normal breath sounds  No wheezing or rales  Abdominal:      General: Bowel sounds are normal  There is no distension  Palpations: Abdomen is soft  Tenderness: There is no abdominal tenderness  Musculoskeletal:         General: No tenderness or deformity  Normal range of motion  Cervical back: Normal range of motion and neck supple  Right lower leg: No edema  Left lower leg: No edema  Skin:     General: Skin is warm and dry  Findings: No rash  Neurological:      General: No focal deficit present  Mental Status: He is alert  Mental status is at baseline     Psychiatric:      Comments: Rapid speech               Xiomara Espinoza MD, PGY3  [facility]  Date: 9/9/2022 Time: 10:54 AM

## 2022-09-09 NOTE — ASSESSMENT & PLAN NOTE
RUQ US with suspicious mass in R lobe of liver   CT A/P 8/27 showed "4 cm segment 8 hepatic lesion suspicious for malignancy, likely primary malignancy or metastatic disease "  MRCP 8/29 showed 3 6 cm mass suspicious for FNH or cholangiocarcinoma  S/p US guided biopsy with IR on 8/31  Tissue exam: Liver, Liver mass, right lobe Hepatocellular carcinoma  - discussed with pt today  He reports he was already told it was cancer  - highly motivated for alcohol and nicotine cessation   - Pt did not make GI appointment because he did not realize he had to after making PCP appointment   He will call the number provided to him by the hospital for appt asap next week  - also placed stat GI referral and message sent to referral specialist for assistance, in case he wants to go to a location closer to him in [facility] rather than [place]   - regardless, he still needs GI appt asap no matter the location to discuss further management

## 2022-09-09 NOTE — ASSESSMENT & PLAN NOTE
Hx drinking 3-4 beers on daily basis, last drink was 8/25/2022  Pt reports stopped drinking since discharge except for 1 beer   -  Continue to encourage alcohol cessation  -  Discharged on folic acid and thiamine however patient has been unable to afford medications  -  Sent medications to new pharmacy closer to patient  If he can afford them  -  Social work consult for the above

## 2022-09-10 ENCOUNTER — TELEPHONE (OUTPATIENT)
Dept: OTHER | Facility: HOSPITAL | Age: 62
End: 2022-09-10

## 2022-09-10 NOTE — TELEPHONE ENCOUNTER
Biopsy results from recent hospitalization show evidence of hepatocellular carcinoma  I called and spoke with the patient on the phone  Patient aware of the diagnosis  Patient saw family medicine yesterday who further discussed the diagnosis and made a referral to gastroenterology for further evaluation and treatment   Patient has a scheduled appointment with GI on 9/14/22 at [address] [place] [address]

## 2022-09-19 ENCOUNTER — CONSULT (OUTPATIENT)
Dept: GASTROENTEROLOGY | Facility: CLINIC | Age: 62
End: 2022-09-19
Payer: COMMERCIAL

## 2022-09-19 VITALS
TEMPERATURE: 98 F | WEIGHT: 154 LBS | BODY MASS INDEX: 24.17 KG/M2 | SYSTOLIC BLOOD PRESSURE: 176 MMHG | HEART RATE: 84 BPM | HEIGHT: 67 IN | DIASTOLIC BLOOD PRESSURE: 98 MMHG

## 2022-09-19 DIAGNOSIS — K86.0 ALCOHOL-INDUCED CHRONIC PANCREATITIS (HCC): Primary | ICD-10-CM

## 2022-09-19 DIAGNOSIS — Z12.11 COLON CANCER SCREENING: ICD-10-CM

## 2022-09-19 DIAGNOSIS — C22.0 HEPATOCELLULAR CARCINOMA (HCC): ICD-10-CM

## 2022-09-19 DIAGNOSIS — B18.2 CHRONIC HEPATITIS C WITHOUT HEPATIC COMA (HCC): ICD-10-CM

## 2022-09-19 PROCEDURE — 99214 OFFICE O/P EST MOD 30 MIN: CPT | Performed by: PHYSICIAN ASSISTANT

## 2022-09-19 NOTE — PROGRESS NOTES
Assessment and Plan    #1  Hepatocellular carcinoma: dx via bx during recent hospitalization   -discussed with patient that he needs to be seen by surgical oncology to discuss treatment options such as TACE, surgical resection  -discussed importance of follow up for this  -main issue at this time is insurance, he lives in [place], per admission at [facility], patient had medicaid pending but it was listed as [address] in chart which is likely not accurate, patient has no insurance card, is trying to get insurance through [employer]  Discussed importance of having active insurance so that he can be treated appropriately  #2  Hepatitis C infection: suspect from tattoo at a young age that was not done professional, denies hx of IVDU  -will send message to Meenu Benavidez to keep patient on radar for hep C treatment, patient without cirrhosis, had recent imaging, however would prefer patient be seen by surgical oncology to determine plan prior to starting any hep C treatment     #3  Chronic alcohol induced pancreatitis: patient reports only having 3 beers in the last 2 weeks, had been drinking 15 per day in the past  -discussed complete alcohol cessation with patient for both the pancreas as well as to allow for hepatitis C treatment  -patient has no symptoms such as weight loss, abdominal pain, or diarrhea at this time     #4   Colon cancer screening  -patient refusing colonoscopy  -could consider cologuard however he is a heavy tobacco user and also has hepatocellular cancer so this may not be the most appropriate as he is at increased risk for colorectal cancer  -patient would like to focus on issues above, can rediscuss this at follow up     Will schedule patient f/u in 2 months   --------------------------------------------------------------------------------------------------------------------    Chief Complaint: f/u [address] , chronic pancreatitis, hep C    HPI: Lolis Santoro is a 65 y o  male with a history of chronic pancreatitis, hepatitis C treatment naive, hepatocellular carcinoma, alcohol abuse, and tobacco abuse who presents today for follow up  Patient was recently hospitalized and diagnosed with hepatitis-C, chronic pancreatitis, and a liver mass which was biopsied and is hepatocellular carcinoma  He has had issues with his health insurance  It appears that there was medical assistance pending when he was in the hospital and there is some documentation in his chart that he has [place] however he lives in [place] and does not have an actual insurance card  He also has reportedly trying to get [employer] as well  This time patient denies any GI symptoms  Denies any nausea, vomiting, lack of appetite, abdominal pain, diarrhea, constipation, blood in the stool, black tarry stool  Denies any unexplained weight loss  Reports that he is eating well  He reports that since leaving the hospital he has only had about 3 cans of beer in the last 2 weeks  Prior to this he reports that he had been drinking upwards of 15 cans a day in the past   Denies any IV drug use history  Denies using any type of drugs for the last 17 years  He is trying to get patches to help with quitting smoking        Review of Systems:   General: negative for fatigue, fever, night sweats or unexpected weight loss  Psychological: negative for anxiety or depression  Ophthalmic: negative for blurry vision or scleral icterus  ENT: negative for headaches, oral lesions, sore throat, vocal changes or dysphagia  Hematological and Lymphatic: negative for pallor or swollen lymph nodes  Respiratory: negative for cough, shortness of breath or wheezing  Cardiovascular: negative for chest pain, edema or murmur  Gastrointestinal: as mentioned in HPI  Genito-Urinary: negative for dysuria or incontinence  Musculoskeletal: negative for joint pain, joint stiffness or joint swelling  Dermatological: negative for pruritus, rash, or jaundice    Current Medications  Current Outpatient Medications   Medication Sig Dispense Refill    folic acid (FOLVITE) 1 mg tablet Take 1 tablet (1 mg total) by mouth daily (Patient not taking: Reported on 9/19/2022) 30 tablet 2    nicotine (NICODERM CQ) 21 mg/24 hr TD 24 hr patch Place 1 patch on the skin daily (Patient not taking: Reported on 9/19/2022) 28 patch 0    thiamine 100 MG tablet Take 1 tablet (100 mg total) by mouth daily (Patient not taking: Reported on 9/19/2022) 30 tablet 2     No current facility-administered medications for this visit  Past Medical History  Past Medical History:   Diagnosis Date    Hepatitis C     Pancreatitis        Past Surgical History  Past Surgical History:   Procedure Laterality Date    IR BIOPSY LIVER MASS  8/31/2022       Past Social History   Social History     Socioeconomic History    Marital status: Single     Spouse name: None    Number of children: None    Years of education: None    Highest education level: None   Occupational History    None   Tobacco Use    Smoking status: Current Every Day Smoker     Packs/day: 1 00    Smokeless tobacco: Never Used    Tobacco comment: now down to half pack/day 09/19/22   Vaping Use    Vaping Use: Never used   Substance and Sexual Activity    Alcohol use: Not Currently     Alcohol/week: 1 0 standard drink     Types: 1 Cans of beer per week     Comment: one 12 oz  can yesterday 09/19/22, down from a case a day    Drug use: Never    Sexual activity: None   Other Topics Concern    None   Social History Narrative    None     Social Determinants of Health     Financial Resource Strain: Low Risk     Difficulty of Paying Living Expenses: Not hard at all   Food Insecurity: No Food Insecurity    Worried About Running Out of Food in the Last Year: Never true    Livier of Food in the Last Year: Never true   Transportation Needs: No Transportation Needs    Lack of Transportation (Medical):  No    Lack of Transportation (Non-Medical):  No   Physical Activity: Inactive    Days of Exercise per Week: 0 days    Minutes of Exercise per Session: 0 min   Stress: No Stress Concern Present    Feeling of Stress : Not at all   Social Connections: Not on file   Intimate Partner Violence: Not At Risk    Fear of Current or Ex-Partner: No    Emotionally Abused: No    Physically Abused: No    Sexually Abused: No   Housing Stability: Low Risk     Unable to Pay for Housing in the Last Year: No    Number of Places Lived in the Last Year: 1    Unstable Housing in the Last Year: No       The following portions of the patient's history were reviewed and updated as appropriate: allergies, current medications, past family history, past medical history, past social history, past surgical history and problem list     Vital Signs  Vitals:    09/19/22 0806   BP: (!) 176/98   BP Location: Left arm   Patient Position: Sitting   Cuff Size: Standard   Pulse: 84   Temp: 98 °F (36 7 °C)   TempSrc: Temporal   Weight: 69 9 kg (154 lb)   Height: 5' 6 7" (1 694 m)       Physical Exam:  General appearance: alert, cooperative, no distress  HEENT: normocephalic, anicteric, no eye erythema or discharge, no oropharyngeal thrush  Neck: supple, trachea midline, no adenopathy  Lungs: CTA b/l, no rales, rhonchi, or wheezing, unlabored respirations  Heart: RRR, no murmur, rubs, or gallops  Abdomen: soft, non-tender, non-distended, normal bowel sounds, no masses or organomegaly  Rectal: deferred  Extremities: no cyanosis, clubbing, or edema  Musculoskeletal: normal gait  Skin: color and texture normal, no jaundice, no rashes or lesions  Psychiatric: alert and oriented, normal affect and behavior

## 2022-09-19 NOTE — PATIENT INSTRUCTIONS
Stop all alcohol use    Call surgical oncology phone number to schedule appt    Check on insurance with [employer], our nurse will call you about hepatitis C treatment

## 2022-09-20 ENCOUNTER — TELEPHONE (OUTPATIENT)
Dept: HEMATOLOGY ONCOLOGY | Facility: CLINIC | Age: 62
End: 2022-09-20

## 2022-09-20 ENCOUNTER — TELEPHONE (OUTPATIENT)
Dept: GASTROENTEROLOGY | Facility: CLINIC | Age: 62
End: 2022-09-20

## 2022-09-20 NOTE — TELEPHONE ENCOUNTER
----- Message from Amara Fuentes MD sent at 9/20/2022  9:44 AM EDT -----  Let's do 10/17  ----- Message -----  From: Eva Cole  Sent: 9/20/2022   9:35 AM EDT  To: Amara Fuentes MD    Hi Dr Rubens Valdez:    The next available appointment in [facility] will be on October 17  Is that OK with you ? If not, the next available appointment in [address] [place] [address] is on 10/11  Please advise  Thank you     ----- Message -----  From: Amara Fuentes MD  Sent: 9/19/2022   4:46 PM EDT  To: Radha Duke PA-C, #    Hi! Can you please schedule Mr Linda Loredo to see me at [address]  He has a new diagnosis of [address]  and has chronic HCV infection  Thanks!     Anish Kapoor

## 2022-09-20 NOTE — TELEPHONE ENCOUNTER
Called patient and scheduled ov with Dr Robson Mata on 10/17 at 10:30 AM  at [facility]

## 2022-09-21 ENCOUNTER — PATIENT OUTREACH (OUTPATIENT)
Dept: FAMILY MEDICINE CLINIC | Facility: CLINIC | Age: 62
End: 2022-09-21

## 2022-09-21 ENCOUNTER — PATIENT OUTREACH (OUTPATIENT)
Dept: HEMATOLOGY ONCOLOGY | Facility: CLINIC | Age: 62
End: 2022-09-21

## 2022-09-21 DIAGNOSIS — C22.0 HEPATOCELLULAR CARCINOMA (HCC): Primary | ICD-10-CM

## 2022-09-21 NOTE — PROGRESS NOTES
I reviewed chart and upcoming appointments, I reached out to the pathaology authorizing physician to confirm if the patient was informed with their diagnosis, I am waiting for response

## 2022-09-21 NOTE — PROGRESS NOTES
RIVER GA received referral from provide Candance Gala, MD due to financial difficulties, lack of insurance, and inability to afford patches and medications  RIVER GA completed chart review and noted that patient was hospitalized from 8/27/22-8/31/22 due to acute pancreatitis  While inpatient, patient met with RIVER  Patient reported that he rents a room at [facility]  Patient reported that he is currently unemployed and receives financial assistance from his friend, Sarah Anderson  Patient's application for MA was pending approval at that time  Patient also reported applying for [facility]  At office visit on 9/9, patient reported smoking only 1 cigarette a day compared to 1 pack a day prior to hospitalization  Patient reported at office visit that he was interested in continuing nicotine patch and expressed that he just received a paycheck and can afford it  Provider noted that nicotine patch would be reordered to new pharmacy  Patient also reported a decrease in alcohol consumption since discharge from hospital  RIVER GA noted on 9/19/22 consult with Gastroenterology, patient reported that he is no longer taking Folic Acid, Nicotine Patch, or Thiamine  According to telephone encounter from 9/19, patient was actually approved for [facility] and not [place] Medicaid as his current address [place] temporary  RIVER GA is aware of concerns with patient's insurance  If he plans to continue to reside in [place] and receive care in [place] then he will need to have his insurance transferred from [place] to [place]  He can call [employer] to follow up on the transfer  If he plans to switch to [place] for care he will need to find an office close to him that accept his insurance  RIVER GA outreached patient to follow up on insurance questions  RIVER GA introduced self and explained role  Patient reported that he is aware that [place] MA is not accepted in [place]  He is in the process of applying for [employer]   Patient reported that he was just hired for a full time job and the agency does offer insurance  However, he is not sure how long he will have to wait before the insurance would be available to him  Patient reported that he hopes to have [employer] in the next few days  Patient reported that he has not been able to afford out of pocket cost for patches or prescribed medication, thus, he is not taking them at the moment  Patient stated that he is smoking about 1/2 pack of cigarettes a day and is only drinking about one can of alcohol a day  Patient reported that he would like a follow up call in a few days to discuss status of [employer]  SW CM will continue to follow and provide psychosocial support as necessary

## 2022-09-23 ENCOUNTER — TELEPHONE (OUTPATIENT)
Dept: SURGICAL ONCOLOGY | Facility: CLINIC | Age: 62
End: 2022-09-23

## 2022-09-23 NOTE — TELEPHONE ENCOUNTER
09/23/22  Intake received  Ins[facility]  [id number]  So long as pt treat in [place]  No outreach needed

## 2022-09-28 ENCOUNTER — PATIENT OUTREACH (OUTPATIENT)
Dept: HEMATOLOGY ONCOLOGY | Facility: CLINIC | Age: 62
End: 2022-09-28

## 2022-09-28 NOTE — PROGRESS NOTES
Review of chart-scheduled patient for ct chest [email or web address]   No prep required

## 2022-09-29 ENCOUNTER — PATIENT OUTREACH (OUTPATIENT)
Dept: CASE MANAGEMENT | Facility: OTHER | Age: 62
End: 2022-09-29

## 2022-09-29 ENCOUNTER — PATIENT OUTREACH (OUTPATIENT)
Dept: HEMATOLOGY ONCOLOGY | Facility: CLINIC | Age: 62
End: 2022-09-29

## 2022-09-29 DIAGNOSIS — C22.0 HEPATOCELLULAR CARCINOMA (HCC): Primary | ICD-10-CM

## 2022-09-29 NOTE — PROGRESS NOTES
Phone outreach to the pt, I introduced myself and explained my role  I went over his upcoming appointment with Surg Onc for 10/18 with Dr Cholo Brannon who will discuss with him any surgical options available  I informed the patient that we scheduled him for a CT Chest for completion of radiology studies for 10/6 and explained diagnostic purpose  He agreed and confirmed understanding of this  The patient reports "feeling good, no problems" at this time and denies any complications at present  The patient reports having a good support system with his 2 brother who live close  He denies any barriers or difficulty getting to and from any appointments or tests and reports dives himself  I also informed him if this were to change Felipa Willoughby can provide a ride service free of charge to and from his appointments  He denies any physical barriers  The patient denies any pain at present  The patient reports still smokes but is down to 5 half cigaretts a day, the patient report prefers using a nicotine patch and refused smoking cessation program The patient does understand his diagnosis and appeared calm when speaking with him  The patient denies any difficulty eating or nausea  I did place a referral to Social Work and I explained what SW can offer and the patient reluctantly agreed  At this point he declined any other resource options  We completed the General Assessment together,  I discussed with him and explained in detail about possible port placement for Chemotherapy if this is planned for his treatment he confirmed understanding  I provided my contact information and Ivette Cuevas and instructed him to please give me a call if he had any needs or questions  I thanked him for his time  The patient did call me back a few minutes later and confirmed that he does have new insurance with [facility] #[id number] and will have his card in about 2 weeks  I will make sure this information gets recorded

## 2022-10-05 ENCOUNTER — PATIENT OUTREACH (OUTPATIENT)
Dept: CASE MANAGEMENT | Facility: OTHER | Age: 62
End: 2022-10-05

## 2022-10-05 NOTE — PROGRESS NOTES
OSW placed outreach call to Mr Jarred Kaufman today  LM on VM with return call information  Will continue to follow

## 2022-10-06 ENCOUNTER — HOSPITAL ENCOUNTER (OUTPATIENT)
Dept: RADIOLOGY | Facility: HOSPITAL | Age: 62
Discharge: HOME | End: 2022-10-06
Payer: COMMERCIAL

## 2022-10-06 DIAGNOSIS — C22.0 HEPATOCELLULAR CARCINOMA (HCC): ICD-10-CM

## 2022-10-06 PROCEDURE — G1004 CDSM NDSC: HCPCS

## 2022-10-06 PROCEDURE — 71250 CT THORAX DX C-: CPT

## 2022-10-10 ENCOUNTER — PATIENT OUTREACH (OUTPATIENT)
Dept: FAMILY MEDICINE CLINIC | Facility: CLINIC | Age: 62
End: 2022-10-10

## 2022-10-10 NOTE — PROGRESS NOTES
RIVER GA previously spoke to patient regarding insurance, medications, cigarette use, and alcohol use  At the time, patient reported that he was in the process of applying for [employer]  At the time, patient also reported that he was unable to afford medications due to out of pocket costs  Patient reported that he was smoking about 1/2 pack of cigarettes a day and was drinking about one can of alcohol a day  Upon chart review, it appears that patient now has insurance with [facility] #[id number]  Oncology RIVER has also attempted to outreach patient  RIVER GA outreached patient today to follow up  Patient did not answer the phone  RIVER GA left a voicemail requesting a return call  RIVER GA will continue to follow and provide psychosocial support as necessary

## 2022-10-11 ENCOUNTER — TELEPHONE (OUTPATIENT)
Dept: FAMILY MEDICINE CLINIC | Facility: CLINIC | Age: 62
End: 2022-10-11

## 2022-10-11 ENCOUNTER — OFFICE VISIT (OUTPATIENT)
Dept: FAMILY MEDICINE CLINIC | Facility: CLINIC | Age: 62
End: 2022-10-11

## 2022-10-11 VITALS
HEART RATE: 88 BPM | SYSTOLIC BLOOD PRESSURE: 168 MMHG | DIASTOLIC BLOOD PRESSURE: 99 MMHG | HEIGHT: 66 IN | WEIGHT: 160.6 LBS | TEMPERATURE: 98.1 F | RESPIRATION RATE: 20 BRPM | BODY MASS INDEX: 25.81 KG/M2 | OXYGEN SATURATION: 97 %

## 2022-10-11 DIAGNOSIS — F10.10 ALCOHOL ABUSE: ICD-10-CM

## 2022-10-11 DIAGNOSIS — Z00.00 ANNUAL PHYSICAL EXAM: Primary | ICD-10-CM

## 2022-10-11 DIAGNOSIS — F17.219 CIGARETTE NICOTINE DEPENDENCE WITH NICOTINE-INDUCED DISORDER: ICD-10-CM

## 2022-10-11 PROCEDURE — 99396 PREV VISIT EST AGE 40-64: CPT | Performed by: FAMILY MEDICINE

## 2022-10-11 NOTE — PATIENT INSTRUCTIONS

## 2022-10-11 NOTE — ASSESSMENT & PLAN NOTE
Current everyday smoker, 3 cigarettes a day  Encouraged smoking cessation and educated NRT  Was on nicotine patch, currently not on any NRT  Patient is motivated, currently working to obtain [employer], could not afford nicotine patches  Patient is in contact with  as well    Patient to follow-up with PCP in 3 months

## 2022-10-11 NOTE — PROGRESS NOTES
[address] [facility]    NAME: Jessica Jara  AGE: 65 y o  SEX: male  : 1960     DATE: 10/11/2022     Assessment and Plan:     Problem List Items Addressed This Visit        Other    Alcohol abuse     Currently reports drinking 1-2 beers on daily basis  Increased on alcohol cessation again  Patient reports he has not been taking folic acid and thiamine given insurance issues  Patient currently working with  to get Medicare  Nicotine dependence     Current everyday smoker, 3 cigarettes a day  Encouraged smoking cessation and educated NRT  Was on nicotine patch, currently not on any NRT  Patient is motivated, currently working to [employer], could not afford nicotine patches  Patient is in contact with  as well  Patient to follow-up with PCP in 3 months         Annual physical exam - Primary     - Screening for cardiovascular disease: pt has labs recently not due for now, reviewed lab work  - Screening for colorectal cancer:  Patient following up with GI for had CC, currently deferring colonoscopy, per chart review, re-discussion the next follow-up with GI about colonoscopy  - Vaccinations:  Declined, counseling given   - Depression screening: neg  - Counseled the patient on healthy lifestyle habits and increase physical activity                 Immunizations and preventive care screenings were discussed with patient today  Appropriate education was printed on patient's after visit summary  Counseling:  Alcohol/drug use: discussed moderation in alcohol intake, the recommendations for healthy alcohol use, and avoidance of illicit drug use  Dental Health: discussed importance of regular tooth brushing, flossing, and dental visits    Injury prevention: discussed safety/seat belts, safety helmets, smoke detectors, carbon dioxide detectors, and smoking near bedding or upholstery  · Exercise: the importance of regular exercise/physical activity was discussed  Recommend exercise 3-5 times per week for at least 30 minutes  Return in about 3 months (around 1/11/2023) for Recheck with PCP   Chief Complaint:     Chief Complaint   Patient presents with   • Annual Exam      History of Present Illness:     Adult Annual Physical   Patient here for a comprehensive physical exam  The patient reports Patient with history hepatocellular carcinoma, currently following up with GI, is here to see Oncology as referred by GI  Patient presents today for annual physical   Denies any acute concerns today  Patient reports he is currently frustrated trying to get Medicaid insurance  Reports he is still smoking 3 cigarettes a day, has not been using nicotine patches because he could not afford them  Working with  for [employer]  Patient reports drinking 1 can of beer a day at least      Diet and Physical Activity  · Diet/Nutrition: limited junk food, low fat diet and consuming 3-5 servings of fruits/vegetables daily  · Exercise: no formal exercise  Depression Screening  PHQ-2/9 Depression Screening         General Health  · Sleep: sleeps poorly  · Hearing: normal - bilateral   · Vision: no vision problems  · Dental: no dental visits for >1 year   Health  · Symptoms include: none     Review of Systems:     Review of Systems   Constitutional: Negative for chills and fever  HENT: Negative for congestion and sore throat  Respiratory: Negative for cough and shortness of breath  Cardiovascular: Negative for chest pain  Gastrointestinal: Negative for abdominal pain  Musculoskeletal: Negative for back pain and neck pain  Skin: Negative for rash  Neurological: Negative for weakness and headaches  Psychiatric/Behavioral: Negative for agitation and behavioral problems        Past Medical History:     Past Medical History:   Diagnosis Date   • Hepatitis C    • Pancreatitis       Past Surgical History:     Past Surgical History:   Procedure Laterality Date   • IR BIOPSY LIVER MASS  8/31/2022      Family History:     History reviewed  No pertinent family history  Social History:     Social History     Socioeconomic History   • Marital status: Single     Spouse name: None   • Number of children: None   • Years of education: None   • Highest education level: None   Occupational History   • None   Tobacco Use   • Smoking status: Current Every Day Smoker     Packs/day: 0 50   • Smokeless tobacco: Never Used   Vaping Use   • Vaping Use: Never used   Substance and Sexual Activity   • Alcohol use: Not Currently     Alcohol/week: 1 0 standard drink     Types: 1 Cans of beer per week     Comment: one 12 oz  can yesterday 09/19/22, down from a case a day   • Drug use: Never   • Sexual activity: Not Currently   Other Topics Concern   • None   Social History Narrative   • None     Social Determinants of Health     Financial Resource Strain: Low Risk    • Difficulty of Paying Living Expenses: Not hard at all   Food Insecurity: No Food Insecurity   • Worried About Running Out of Food in the Last Year: Never true   • Ran Out of Food in the Last Year: Never true   Transportation Needs: No Transportation Needs   • Lack of Transportation (Medical): No   • Lack of Transportation (Non-Medical):  No   Physical Activity: Inactive   • Days of Exercise per Week: 0 days   • Minutes of Exercise per Session: 0 min   Stress: No Stress Concern Present   • Feeling of Stress : Not at all   Social Connections: Not on file   Intimate Partner Violence: Not At Risk   • Fear of Current or Ex-Partner: No   • Emotionally Abused: No   • Physically Abused: No   • Sexually Abused: No   Housing Stability: Low Risk    • Unable to Pay for Housing in the Last Year: No   • Number of Places Lived in the Last Year: 1   • Unstable Housing in the Last Year: No      Current Medications:     No current outpatient medications on file  No current facility-administered medications for this visit  Allergies:     No Known Allergies   Physical Exam:     /99 (BP Location: Left arm, Patient Position: Sitting, Cuff Size: Large)   Pulse 88   Temp 98 1 °F (36 7 °C) (Temporal)   Resp 20   Ht 5' 6" (1 676 m)   Wt 72 8 kg (160 lb 9 6 oz)   SpO2 97%   BMI 25 92 kg/m²     Physical Exam  Vitals and nursing note reviewed  Constitutional:       Appearance: He is well-developed  HENT:      Head: Normocephalic and atraumatic  Eyes:      Conjunctiva/sclera: Conjunctivae normal    Cardiovascular:      Rate and Rhythm: Normal rate and regular rhythm  Heart sounds: No murmur heard  Pulmonary:      Effort: Pulmonary effort is normal  No respiratory distress  Breath sounds: Normal breath sounds  Abdominal:      Palpations: Abdomen is soft  Tenderness: There is no abdominal tenderness  Musculoskeletal:         General: Normal range of motion  Cervical back: Neck supple  Right lower leg: No edema  Left lower leg: No edema  Skin:     General: Skin is warm and dry  Neurological:      Mental Status: He is alert and oriented to person, place, and time     Psychiatric:         Mood and Affect: Mood normal          Behavior: Behavior normal           Sunny Dean MD  [address]

## 2022-10-11 NOTE — ASSESSMENT & PLAN NOTE
- Screening for cardiovascular disease: pt has labs recently not due for now, reviewed lab work  - Screening for colorectal cancer:  Patient following up with GI for had CC, currently deferring colonoscopy, per chart review, re-discussion the next follow-up with GI about colonoscopy  - Vaccinations:  Declined, counseling given   - Depression screening: neg  - Counseled the patient on healthy lifestyle habits and increase physical activity

## 2022-10-11 NOTE — TELEPHONE ENCOUNTER
Pt greater than 15 minutes was marked No Show, arrived 23 minutes late and rescheduled, pt states he was stuck at work

## 2022-10-11 NOTE — ASSESSMENT & PLAN NOTE
Currently reports drinking 1-2 beers on daily basis  Increased on alcohol cessation again  Patient reports he has not been taking folic acid and thiamine given insurance issues  Patient currently working with  to get Medicare

## 2022-10-12 ENCOUNTER — PATIENT OUTREACH (OUTPATIENT)
Dept: CASE MANAGEMENT | Facility: OTHER | Age: 62
End: 2022-10-12

## 2022-10-12 NOTE — LETTER
October 12, 2022           Dear Mr ManceraGabbie Sheets you for speaking with me today  Enclosed is the paper application for [facility]  You are also able to apply for food stamps (SNAP) with the same application  Please call me if you have any questions or concerns       Sincerely,        ABNER Tillman, OSW-C

## 2022-10-12 NOTE — PROGRESS NOTES
Biopsychosocial and Barriers Assessment    Cancer Diagnosis: Hepatocellular Cancer  Home/Cell Phone: [phone]  Emergency Contact: Alejandra Mcguire (brother) [phone]  Marital Status: single  Interpretation concerns, speaks another language, preferred language: English  Cultural concerns: none identified  Ability to read or write: yes    Caregiver/Support: brother  Children: did not address  Child/Elder care: did not address    Housing: Apartment building  Home Setup: steps to apartment  Lives With: roommate  Daily Living Activities: independent  [address]: none  Ambulation: independent    Preferred Pharmacy: [facility] [address] with insurance: No insurance at this time-applying for MA  High co-pays with medication coverage:No insurance at this time-applying for MA  No medication coverage: No insurance at this time-applying for MA    Primary Care Provider: Sri Pelayo MD [facility]  Hx of [facility] [address]: not addressed  Hx of Short term rehab: not addressed  Mental Health Hx: unknown  Substance Abuse Hx: Active ETOH and tobacco use  Employment: working FT   Status/Location: not addressed  Ability to pay bills: he is behind on bills but is working OT to get caught up  POA/LW/AD: not addressed  Transportation Plan/Concerns: drives self      What do you know about your Cancer Diagnosis    What has your doctor told you about your cancer diagnosis: I have a lot of other things to worry about other than my cancer  What has your doctor told you about your cancer treatment: I have to meet with the doctor yet  What specific concerns do you have about your diagnosis and treatment: None at this time    Have you been made aware of any hair loss associated with treatment: N/A    Additional Comments:    Limited biopsychosocial assessment completed today  Called pt and introduced self and role  Mr Aisha Carrasco stated he does not have any emotional support needs   He stated "I don't worry about that stuff  I need brakes on my truck and I'm not worrying about my cancer " "I don't need a support group or anything like that " Mr Grayson Walton stated he is working overnight from 11pm to 7am and 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  He goes home to sleep then goes back to work at night  He stated he is behind on bills but is hopeful working overtime will get him caught up  OSW explained [address], however he did not appear to be interested in program at this time  Discussed his current health insurance concerns  He stated he signed up for [address], but he "hung up" on the representative/call regarding health insurance because of frustration and confusion  He stated he "does not like automated calls, texting, emails or anything that doesn't involve speaking to a real body or getting paper mail " He stated "I am a simple man and I don't like these new ways of dealing with people "  After further discussion, Mr rGayson Walton confirmed he is not sure he is signed up for any insurance  OSW offered to mail him the paper application for [place] Medical Assistance, and he stated this would be extremely helpful  He shared he will sit at his desk and be able to read through and complete at home which is more desirable for him  He is receptive to another call from OSW in 10-14 days  OSW sent paper application in mail to pt's address  Sent email to oncology finance team and nurse navigation explaining above  Will continue to follow

## 2022-10-13 ENCOUNTER — PATIENT OUTREACH (OUTPATIENT)
Dept: HEMATOLOGY ONCOLOGY | Facility: CLINIC | Age: 62
End: 2022-10-13

## 2022-10-16 NOTE — PROGRESS NOTES
[facility] - Outpatient Consultation  Maura Cole 65 y o  male MRN: [id number]  Encounter: [id number]    PCP:  Yoel Aguilar MD, [phone]  Referring Provider:  Roberto Carlos Fournier MD, [phone]    Patient: Maura Cole, 1960  Reason for Referral: [facility]      ASSESSMENT/PLAN:  65 y o  male with history of chronic HCV infection and EtOH use disorder in early remission with suspected cirrhosis who presents for initial evaluation for [facility]  He has history of EtOH abuse as well as untreated HCV GT1a infection that was likely acquired through tattoos as a teenager  In August 2022, he was admitted for EtOH pancreatitis and was incidentally found to have 3 6 cm lesion in segment 8 with minimal enhancement in the arterial phase but with delayed enhancement with pseudocapsule in the portal phase  He underwent biopsy of this lesion which was confirmed to be [facility]  He likely has advanced liver disease given history of longstanding EtOH/HCV infection, liver synthetic dysfunction and thrombocytopenia  Additionally, he has temporal/muscle wasting and sarcopenia on exam but has no clinical evidence of hepatic decompensation  He has no radiographic evidence of pHTN and liver biopsy was unfortunately not performed on non-malignant tissue to confirm the presence of cirrhosis  Will order US elastography to assess hepatic fibrosis as LSM, although this will likely be an overestimation in the context of EtOH hepatitis  If he is confirmed to have pHTN, he will need to have an EGD to screen for varices  I advised that he should completely abstain from EtOH moving forward to prevent progression of liver disease and hepatic decompensation  He should also be treated for chronic HCV infection, but timing will be determined based on his [facility]  course  Regarding his [facility] , we discussed curative therapies, including surgical resection, ablation and liver transplantation as his tumor is within Jose criteria   If is confirmed to have pHTN, would recommend LT evaluation with LDT as a bridge to transplant  However, he would be a high risk candidate given early remission from EtOH use and unstable social situation and ?lack of support  He will return in 6 weeks or sooner if needed  Thank you for the opportunity to consult in his care  1  BCLC stage B HCC, 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 pHTN  - MELD-Na 9, CTP A6, send ABO  - US elastography  - Will likely need EGD to screen for varices  - Advised complete abstinence    3  Chronic HCV infection, GT1a with [id number] VL  - HIV  - HAV IgG, vaccinate if non-immune  - HCV Fibrosure   - Timing of treatment pending [place] [address]  treatment course but would recommend Mayvret or Epclusa depending on insurance    4  Colorectal cancer screening   - No history of CRC screening, COY ordered     I made him aware of the symptoms of hepatic decompensation: evident confusion, vomiting blood, black tarry stool, or large amounts of red blood in stool, and abdominal swelling  In addition to seeking local medical attention urgently, he will notify me if this happens before the next visit  Mahesh Knowles MD  Division of Gastroenterology and Hepatology  [address]    ============================================================================  CC/HPI: 65 y o  male with history of chronic HCV infection and EtOH use disorder with pancreatitis who presents for initial evaluation for [place] [address]  He was admitted in August 2022 for severe epigastric pain found to EtOH pancreatitis  He had an MRI abdomen with MRCP which showed 3 6 cm lesion in segment 8 with minimal arterial enhancement as well diffuse internal enhancement of the portal phase without capsular retraction or intrahepatic biliary dilatation   His AFP was 8 5 and he underwent IR guided liver biopsy which showed [address]  He later had a CT chest in October 2022 which showed subCM pulmonary nodules  He reports drinking 15 beers daily but stopped drinking 2-3 weeks ago  He had a DUI 2-3 years ago and denies history of complicated withdrawal      He has never been treated for his HCV infection  Also reports home tattoo during his teenage years as well as prior intranasal cocaine use  He is still smoking cigarettes but has significantly cut down his tobacco intake  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 [facility] because he could not afford his rent during the pandemic  He lives alone but has two children  ROS: Complete review of systems otherwise negative  PAST MEDICAL/SURGICAL HISTORY:  Past Medical History:   Diagnosis Date   • Hepatitis C    • Pancreatitis         Past Surgical History:   Procedure Laterality Date   • IR BIOPSY LIVER MASS  8/31/2022       FAMILY/SOCIAL HISTORY:  History reviewed  No pertinent family history  Social History     Tobacco Use   • Smoking status: Current Every Day Smoker     Packs/day: 0 50   • Smokeless tobacco: Never Used   Vaping Use   • Vaping Use: Never used   Substance Use Topics   • Alcohol use: Not Currently     Alcohol/week: 1 0 standard drink     Types: 1 Cans of beer per week     Comment: one 12 oz  can yesterday 09/19/22, down from a case a day   • Drug use: Never       MEDICATIONS:  No current outpatient medications on file prior to visit  No current facility-administered medications on file prior to visit       No Known Allergies    PHYSICAL EXAM:  BP (!) 184/97 (BP Location: Left arm, Patient Position: Sitting, Cuff Size: Standard)   Pulse 80   Ht 5' 6" (1 676 m)   Wt 72 2 kg (159 lb 3 2 oz)   BMI 25 70 kg/m²   GENERAL: NAD, AAO  HEENT: +scleral icterus, OP clear, MMM, temporal wasting   PULMONARY: CTAB  CARDIAC: RRR, no murmurs  ABDOMEN: S/ND/NT, normoactive BS, no hepatomegaly, spleen not palpable  EXTREMITIES: no edema  SKIN: no rashes, no palmar erythema, no spider angiomata   NEURO: normal gait, no tremor, no asterixis     LABS/RADIOLOGY/ENDOSCOPY:  Lab Results   Component Value Date    WBC 7 53 08/30/2022    HGB 12 1 08/30/2022    HCT 37 2 08/30/2022     08/30/2022    BUN 14 08/31/2022    CREATININE 1 07 08/31/2022    K 4 3 08/31/2022     08/31/2022    CO2 29 08/31/2022    ALKPHOS 104 08/31/2022     (H) 08/31/2022     (H) 08/31/2022    CALCIUM 8 6 08/31/2022    EGFR 74 08/31/2022    TRIG 80 08/28/2022    HDL 28 (L) 08/28/2022    INR 1 24 (H) 08/29/2022    PTT 30 08/29/2022     AFP 8 5    CT A/P with IV contrast (8/27/2022)  4 cm segment 8 hepatic lesion suspicious for malignancy, likely primary malignancy or metastatic disease      13 mm maris hepatic node  MRI abdomen with and without contrast with MRCP (8/29/2022)  1   3 6 cm indeterminate solid mass segment 8 right hepatic lobe suspicious for malignancy  Leading differential etiologies include fibrolamellar hepatocellular carcinoma (favored) or possibly intrahepatic cholangiocarcinoma  Biopsy is recommended  Background hepatic steatosis      2  Mild maris hepatis adenopathy, nonspecific  Metastatic disease cannot be excluded although the possibility of reactive adenopathy is possible in the setting of recent acute pancreatitis      3  7 mm arterial enhancing lesion in segment 3 left hepatic lobe with features most suggestive of flash enhancement of cavernous hemangioma  Attention on three-month follow-up advised      4  Question minimally edematous pancreatic parenchyma (neck/proximal body) which could be manifestation of recent acute interstitial edematous pancreatitis  No significant peripancreatic fluid collection or evidence of pancreatic necrosis        5  Normal MRCP without evidence of choledocholithiasis      6   Cholelithiasis      CT chest (10/6/2022)  No convincing evidence of metastatic disease in the thorax      Several nonspecific scattered pulmonary nodules measuring up to 3 mm in size  Recommend follow-up chest CT in 3-6 months to ensure stability given known malignancy  Liver biopsy (8/31/2022)  A  Liver, Liver mass, right lobe:  - Hepatocellular carcinoma  See comment      Comment: Immunohistochemical stains performed with adequate controls (A1) demonstrate that the tumor is positive for CAM5 2, Arginase and HSA (patchy)  Polyclonal CEA shows canalicular staining pattern with diffuse CD34 positive staining  There is thickening of trabeculae highlighted by Reticulin  The tumor is negative for CK7, CDX2, NKX3 1, TTF1, SATB2, CK20, CK19 and CK7  Intradepartmental consultation with concurs with the diagnosis (Kim Red)  The final diagnosis is communicated with Dr Noemy Guerrero via [facility] Text at 3:01 pm on 9/6/22       MELD-Na score: 9 at 8/31/2022  5:41 AM  MELD score: 9 at 8/31/2022  5:41 AM  Calculated from:  Serum Creatinine: 1 07 mg/dL at 8/31/2022  5:41 AM  Serum Sodium: 141 mmol/L (Using max of 137 mmol/L) at 8/31/2022  5:41 AM  Total Bilirubin: 0 75 mg/dL (Using min of 1 mg/dL) at 8/31/2022  5:41 AM  INR(ratio): 1 24 at 8/29/2022  4:36 AM  Age: 62 years

## 2022-10-17 ENCOUNTER — OFFICE VISIT (OUTPATIENT)
Dept: GASTROENTEROLOGY | Facility: CLINIC | Age: 62
End: 2022-10-17
Payer: COMMERCIAL

## 2022-10-17 ENCOUNTER — TELEPHONE (OUTPATIENT)
Dept: GASTROENTEROLOGY | Facility: CLINIC | Age: 62
End: 2022-10-17

## 2022-10-17 ENCOUNTER — TELEPHONE (OUTPATIENT)
Dept: SURGICAL ONCOLOGY | Facility: CLINIC | Age: 62
End: 2022-10-17

## 2022-10-17 ENCOUNTER — APPOINTMENT (OUTPATIENT)
Dept: LAB | Facility: CLINIC | Age: 62
End: 2022-10-17
Payer: COMMERCIAL

## 2022-10-17 VITALS
WEIGHT: 159.2 LBS | HEIGHT: 66 IN | HEART RATE: 80 BPM | DIASTOLIC BLOOD PRESSURE: 97 MMHG | SYSTOLIC BLOOD PRESSURE: 184 MMHG | BODY MASS INDEX: 25.58 KG/M2

## 2022-10-17 DIAGNOSIS — B18.2 CHRONIC HEPATITIS C WITHOUT HEPATIC COMA (HCC): ICD-10-CM

## 2022-10-17 DIAGNOSIS — C22.0 HEPATOCELLULAR CARCINOMA (HCC): ICD-10-CM

## 2022-10-17 DIAGNOSIS — B18.2 CHRONIC HEPATITIS C WITHOUT HEPATIC COMA (HCC): Primary | ICD-10-CM

## 2022-10-17 DIAGNOSIS — K85.20 ALCOHOL-INDUCED ACUTE PANCREATITIS, UNSPECIFIED COMPLICATION STATUS: ICD-10-CM

## 2022-10-17 DIAGNOSIS — Z12.11 COLON CANCER SCREENING: ICD-10-CM

## 2022-10-17 DIAGNOSIS — K70.9 ALCOHOLIC LIVER DISEASE (HCC): ICD-10-CM

## 2022-10-17 LAB
ABO GROUP BLD: NORMAL
AFP-TM SERPL-MCNC: 17 NG/ML (ref 0.5–8)
ALBUMIN SERPL BCP-MCNC: 3.6 G/DL (ref 3.5–5)
ALP SERPL-CCNC: 123 U/L (ref 46–116)
ALT SERPL W P-5'-P-CCNC: 186 U/L (ref 12–78)
ANION GAP SERPL CALCULATED.3IONS-SCNC: 7 MMOL/L (ref 4–13)
AST SERPL W P-5'-P-CCNC: 181 U/L (ref 5–45)
BASOPHILS # BLD AUTO: 0.05 THOUSANDS/ΜL (ref 0–0.1)
BASOPHILS NFR BLD AUTO: 1 % (ref 0–1)
BILIRUB SERPL-MCNC: 0.67 MG/DL (ref 0.2–1)
BUN SERPL-MCNC: 9 MG/DL (ref 5–25)
CALCIUM SERPL-MCNC: 9.1 MG/DL (ref 8.3–10.1)
CHLORIDE SERPL-SCNC: 107 MMOL/L (ref 96–108)
CO2 SERPL-SCNC: 22 MMOL/L (ref 21–32)
CREAT SERPL-MCNC: 0.87 MG/DL (ref 0.6–1.3)
EOSINOPHIL # BLD AUTO: 0.45 THOUSAND/ΜL (ref 0–0.61)
EOSINOPHIL NFR BLD AUTO: 6 % (ref 0–6)
ERYTHROCYTE [DISTWIDTH] IN BLOOD BY AUTOMATED COUNT: 13.1 % (ref 11.6–15.1)
GFR SERPL CREATININE-BSD FRML MDRD: 93 ML/MIN/1.73SQ M
GLUCOSE SERPL-MCNC: 101 MG/DL (ref 65–140)
HCT VFR BLD AUTO: 43.1 % (ref 36.5–49.3)
HGB BLD-MCNC: 13.6 G/DL (ref 12–17)
IMM GRANULOCYTES # BLD AUTO: 0.03 THOUSAND/UL (ref 0–0.2)
IMM GRANULOCYTES NFR BLD AUTO: 0 % (ref 0–2)
INR PPP: 1.13 (ref 0.84–1.19)
LYMPHOCYTES # BLD AUTO: 3.08 THOUSANDS/ΜL (ref 0.6–4.47)
LYMPHOCYTES NFR BLD AUTO: 40 % (ref 14–44)
MCH RBC QN AUTO: 31.1 PG (ref 26.8–34.3)
MCHC RBC AUTO-ENTMCNC: 31.6 G/DL (ref 31.4–37.4)
MCV RBC AUTO: 98 FL (ref 82–98)
MONOCYTES # BLD AUTO: 0.82 THOUSAND/ΜL (ref 0.17–1.22)
MONOCYTES NFR BLD AUTO: 11 % (ref 4–12)
NEUTROPHILS # BLD AUTO: 3.25 THOUSANDS/ΜL (ref 1.85–7.62)
NEUTS SEG NFR BLD AUTO: 42 % (ref 43–75)
NRBC BLD AUTO-RTO: 0 /100 WBCS
PLATELET # BLD AUTO: 165 THOUSANDS/UL (ref 149–390)
PMV BLD AUTO: 12.3 FL (ref 8.9–12.7)
POTASSIUM SERPL-SCNC: 4.1 MMOL/L (ref 3.5–5.3)
PROT SERPL-MCNC: 8.1 G/DL (ref 6.4–8.4)
PROTHROMBIN TIME: 14.7 SECONDS (ref 11.6–14.5)
RBC # BLD AUTO: 4.38 MILLION/UL (ref 3.88–5.62)
RH BLD: POSITIVE
SODIUM SERPL-SCNC: 136 MMOL/L (ref 135–147)
WBC # BLD AUTO: 7.68 THOUSAND/UL (ref 4.31–10.16)

## 2022-10-17 PROCEDURE — 99215 OFFICE O/P EST HI 40 MIN: CPT | Performed by: STUDENT IN AN ORGANIZED HEALTH CARE EDUCATION/TRAINING PROGRAM

## 2022-10-17 PROCEDURE — 86900 BLOOD TYPING SEROLOGIC ABO: CPT

## 2022-10-17 PROCEDURE — 82977 ASSAY OF GGT: CPT

## 2022-10-17 PROCEDURE — 86901 BLOOD TYPING SEROLOGIC RH(D): CPT

## 2022-10-17 PROCEDURE — 82172 ASSAY OF APOLIPOPROTEIN: CPT

## 2022-10-17 PROCEDURE — 84460 ALANINE AMINO (ALT) (SGPT): CPT

## 2022-10-17 PROCEDURE — 82247 BILIRUBIN TOTAL: CPT

## 2022-10-17 PROCEDURE — 85610 PROTHROMBIN TIME: CPT

## 2022-10-17 PROCEDURE — 85025 COMPLETE CBC W/AUTO DIFF WBC: CPT

## 2022-10-17 PROCEDURE — 36415 COLL VENOUS BLD VENIPUNCTURE: CPT

## 2022-10-17 PROCEDURE — 83010 ASSAY OF HAPTOGLOBIN QUANT: CPT

## 2022-10-17 PROCEDURE — 82105 ALPHA-FETOPROTEIN SERUM: CPT

## 2022-10-17 PROCEDURE — 83883 ASSAY NEPHELOMETRY NOT SPEC: CPT

## 2022-10-17 PROCEDURE — 80053 COMPREHEN METABOLIC PANEL: CPT

## 2022-10-17 PROCEDURE — 86301 IMMUNOASSAY TUMOR CA 19-9: CPT

## 2022-10-17 RX ORDER — POLYETHYLENE GLYCOL 3350, SODIUM SULFATE ANHYDROUS, SODIUM BICARBONATE, SODIUM CHLORIDE, POTASSIUM CHLORIDE 236; 22.74; 6.74; 5.86; 2.97 G/4L; G/4L; G/4L; G/4L; G/4L
4000 POWDER, FOR SOLUTION ORAL ONCE
Qty: 4000 ML | Refills: 0 | Status: SHIPPED | OUTPATIENT
Start: 2022-10-17 | End: 2022-10-17

## 2022-10-17 NOTE — PATIENT INSTRUCTIONS
Complete your labs today  Schedule EGD  Schedule ultrasound elastography   Schedule MRI in November   I will discuss your options for treatment for your [address]

## 2022-10-17 NOTE — TELEPHONE ENCOUNTER
Tc to pt to discuss upcoming appt withy Dr Quinn Garces tomorrow 10/18  Asked pt if he had a different insurance other than the [place] Medicaid that we have on file  Pt stated he had his [employer] card in his wallet and would bring that to tomorrow's appt

## 2022-10-17 NOTE — TELEPHONE ENCOUNTER
Can you please x out of patient's chart he is here for an appt and I cannot get in to do his check in please and thank you

## 2022-10-18 ENCOUNTER — TELEPHONE (OUTPATIENT)
Dept: SURGICAL ONCOLOGY | Facility: CLINIC | Age: 62
End: 2022-10-18

## 2022-10-18 ENCOUNTER — PATIENT OUTREACH (OUTPATIENT)
Dept: HEMATOLOGY ONCOLOGY | Facility: CLINIC | Age: 62
End: 2022-10-18

## 2022-10-18 LAB — CANCER AG19-9 SERPL-ACNC: 28 U/ML (ref 0–35)

## 2022-10-18 NOTE — TELEPHONE ENCOUNTER
I left a voicemail with the hopeline phone number if the parent would like to reschedule the missed appointment

## 2022-10-20 ENCOUNTER — DOCUMENTATION (OUTPATIENT)
Dept: HEMATOLOGY ONCOLOGY | Facility: CLINIC | Age: 62
End: 2022-10-20

## 2022-10-20 ENCOUNTER — PATIENT OUTREACH (OUTPATIENT)
Dept: HEMATOLOGY ONCOLOGY | Facility: CLINIC | Age: 62
End: 2022-10-20

## 2022-10-20 LAB
A2 MACROGLOB SERPL-MCNC: 463 MG/DL (ref 110–276)
ALT SERPL W P-5'-P-CCNC: 175 IU/L (ref 0–55)
APO A-I SERPL-MCNC: 134 MG/DL (ref 101–178)
BILIRUB SERPL-MCNC: 0.6 MG/DL (ref 0–1.2)
COMMENT: ABNORMAL
FIBROSIS SCORING:: ABNORMAL
FIBROSIS STAGE SERPL QL: ABNORMAL
GGT SERPL-CCNC: 400 IU/L (ref 0–65)
HAPTOGLOB SERPL-MCNC: 50 MG/DL (ref 32–363)
INTERPRETATIONS: ABNORMAL
LIVER FIBR SCORE SERPL CALC.FIBROSURE: 0.94 (ref 0–0.21)
NECROINFLAMM ACTIVITY SCORING:: ABNORMAL
NECROINFLAMMATORY ACT GRADE SERPL QL: ABNORMAL
NECROINFLAMMATORY ACT SCORE SERPL: 0.9 (ref 0–0.17)
SERVICE CMNT-IMP: ABNORMAL

## 2022-10-20 NOTE — PROGRESS NOTES
Spoke with Olivia Herndon at [employer] member eligibility regarding patients membership  He has NO health coverage  His membership entitles him to the basic discounts etc AARP provides  Any coverage be it medical, dental, or vision would require application and fees

## 2022-10-20 NOTE — PROGRESS NOTES
RECTAL/GI MULTIDISCIPLINARY CASE REVIEW    DATE: 10/20/2022      PRESENTING DOCTOR: Dr. Manpreet Michel      DIAGNOSIS: Hepatocellular Carcinoma     Latoya Sen was presented at the Rectal/GI Multidisciplinary Conference today. PHYSICIAN RECOMMENDED PLAN:    - Recommended to continue for upper endoscopy for 10/27/22.  - Recommended to follow up with Dr. Jimena Jaimes for possible resection.   - Recommended for ultrasound elastography. Team agreed to plan. The final treatment plan will be left to the discretion of the patient and the treating physician. DISCLAIMERS:  TO THE TREATING PHYSICIAN:  This conference is a meeting of clinicians from various specialty areas who evaluate and discuss patients for whom a multidisciplinary treatment approach is being considered. Please note that the above opinion was a consensus of the conference attendees and is intended only to assist in quality care of the discussed patient. The responsibility for follow up on the input given during the conference, along with any final decisions regarding plan of care, is that of the patient and the patient's provider. Accordingly, appointments have only been recommended based on this information and have NOT been scheduled unless otherwise noted. TO THE PATIENT:  This summary is a brief record of major aspects of your cancer treatment. You may choose to share a copy with any of your doctors or nurses. However, this is not a detailed or comprehensive record of your care.       NCCN guidelines were readily available for review at this discussion

## 2022-10-20 NOTE — PROGRESS NOTES
Phone outreach to the patient to update him on tumor board recommendations to set an appointment with Dr Jana Agee at [facility] and to schedule him for a elastography ultrasound  I left a message on patient's vm to return my call  The patient returned call shortly after LM  I discussed with him his appointments with Dr Jana Agee and his ultrasound elastography and the appointment time and dates  The patient confirmed understanding

## 2022-10-24 ENCOUNTER — PATIENT OUTREACH (OUTPATIENT)
Dept: FAMILY MEDICINE CLINIC | Facility: CLINIC | Age: 62
End: 2022-10-24

## 2022-10-24 NOTE — PROGRESS NOTES
RIVER GA previously outreached patient to assist with insurance needs  Patient has not responded to outreached from 10/10  RIVER GA reviewed chart and noted that OSW spoke to patient on 10/12 and sent patient a copy of [place] MA application as patient reported at the time that he was unsure if his [employer] was actually approved and active  Further review of chart notes from 10/20 completed by oncology medical assistance states that patient does not have any health coverage with his [employer]  RIVER GA attempted to outreach patient regarding this information and to determine if he has initiated the process of applying for [place] MA  Patient did not answer the phone  RIVER GA left a voicemail requesting a return call  RIVER GA will send an unable to reach letter as this is the second attempt to reach patient with no response  RIVER GA will remain available to provide psychosocial support as necessary

## 2022-10-24 NOTE — LETTER
[address] [facility] [address] [phone]    Re: social work resources   10/24/2022       Dear Mich Ledbetter,    We tried to reach you by phone on 10/10 and 10/24 and was unfortunately unable to reach you regarding support with your insurance benefits    If you would like further assistance please reach out to [address] as soon as possible at: [phone]    Sincerely,         RIVER Woodward

## 2022-10-26 ENCOUNTER — PATIENT OUTREACH (OUTPATIENT)
Dept: HEMATOLOGY ONCOLOGY | Facility: CLINIC | Age: 62
End: 2022-10-26

## 2022-10-26 ENCOUNTER — PATIENT OUTREACH (OUTPATIENT)
Dept: CASE MANAGEMENT | Facility: OTHER | Age: 62
End: 2022-10-26

## 2022-10-26 RX ORDER — SODIUM CHLORIDE, SODIUM LACTATE, POTASSIUM CHLORIDE, CALCIUM CHLORIDE 600; 310; 30; 20 MG/100ML; MG/100ML; MG/100ML; MG/100ML
125 INJECTION, SOLUTION INTRAVENOUS CONTINUOUS
OUTPATIENT
Start: 2022-10-26

## 2022-10-26 NOTE — PROGRESS NOTES
OSW called pt today  He was in the truck going home from work  He joked stating "I have all those papers in the back of the truck (meaning the Medical Assistance application)  He then confirmed that he did receive the application but has not looked at the application yet  He then talked about his diet and eating healthier  He stated "I really think my body will heal itself  I just have to keep telling my body to keep healing " He stated "wait until they do the tests, they will see I have an improvement!"   Mr Zaynab Portillo then stated someone talked to him about a transplant  He responded that he would not take a transplant if there was someone younger and had an opportunity to live many years that was also on the list for transplant  He was perseverative and tangential at times  OSW encouraged him to look at [employer] application and offered support if needed  Offered to call him again and he was agreeable, however stated not to call him after Thanksgiving because he goes hunting  Will follow up prior to that date

## 2022-10-26 NOTE — PROGRESS NOTES
Received call from the patient asking which doctor he has to see today  I informed him he has an appointment next Wednesday with Dr Scooter Brewer at [facility] [place]  The patient confirmed understanding

## 2022-10-28 ENCOUNTER — TELEPHONE (OUTPATIENT)
Dept: SURGICAL ONCOLOGY | Facility: CLINIC | Age: 62
End: 2022-10-28

## 2022-10-28 NOTE — TELEPHONE ENCOUNTER
10/28/22  Shireen Salamanca sent an e mail for a status on the patient and his progress for getting assistance in [place]  I placed a call to the patient and he says that he and Lazarus Randy had a conversation a couple of days ago and she was going to get him an assistance application  He attempted to call what he believes was Medicaid  His attempts to call Medicaid always make him frustrated because he cannot figure out what options on the menu to pick  He wants to speak with a person obviously  So I gave him my contact information to update me and I'll speak with him again  I will also speak with Marquise Chaudhari for the progress in getting the patient the application for assistance  The patient was very grateful for my call and wants to take care of his situation  The patient just called me and said that the application from Tanya Calderon for medical assistance was in mail  He will fill this out today immediately  Thanked me again

## 2022-10-28 NOTE — TELEPHONE ENCOUNTER
Called patient to verify if he will be doing self pay for appt on 11/2/22 with Dr Zaida Gaston due to his [employer]

## 2022-10-31 ENCOUNTER — PATIENT OUTREACH (OUTPATIENT)
Dept: FAMILY MEDICINE CLINIC | Facility: CLINIC | Age: 62
End: 2022-10-31

## 2022-10-31 ENCOUNTER — PATIENT OUTREACH (OUTPATIENT)
Dept: CASE MANAGEMENT | Facility: OTHER | Age: 62
End: 2022-10-31

## 2022-10-31 NOTE — PROGRESS NOTES
RIVER GA received return call from patient  Patient reported that he completed the MA/SNAP application to the best of his ability  RIVER GA provided him with the address to drop off the application to [facility]  Patient reported that he will request assistance while there and take copies of required documents  Patient reported that his James Josue is currently in the shop so he will walk to the assistance office  Patient is concerned about costs of appointments and reported that he will no longer go to them if he needs to spend his 'entire paycheck'  RIVER GA explained that [facility] does have ivy care that he can apply for  RIVER GA stated that the number for financial counselors will be on the bills that he receives  Patient also made statements such as getting a passport and going to [facility] and he would rather a transplant go to a patient that is younger than him  Patient also reported that he is currently smoking again due to his stress  RIVER GA provided supportive counseling  RIVER GA will continue to follow and provide psychosocial support as necessary

## 2022-10-31 NOTE — PROGRESS NOTES
RIVER GA received voicemail from patient on 10/28  He reported that he received [place] MA assistance application and he will be working on completing the necessary paperwork  RIVER GA outreached patient to follow up  Patient did not answer the phone  RIVER GA left a detailed voicemail regarding the importance of completing the application and advised him to please call RIVER GA if he needs any further assistance or if he has any further questions  RIVER GA will continue to follow and provide psychosocial support as necessary

## 2022-10-31 NOTE — PROGRESS NOTES
OSW received update from colleague, Lefty Golden CM  Pt is reporting to her he will hand deliver MA application to [facility] tomorrow, 11/1  Discussed [facility] program  Explained as per policy, pt cannot apply for this until final determination is made regarding MA  Emailed oncology finance team regarding this update and inquiring if his scheduled appointment with surgical oncology on 11/2 needs to be changed  Will work mutually with Lillian [employer] regarding support to pt

## 2022-11-02 ENCOUNTER — TELEPHONE (OUTPATIENT)
Dept: HEMATOLOGY ONCOLOGY | Facility: CLINIC | Age: 62
End: 2022-11-02

## 2022-11-02 ENCOUNTER — PATIENT OUTREACH (OUTPATIENT)
Dept: FAMILY MEDICINE CLINIC | Facility: CLINIC | Age: 62
End: 2022-11-02

## 2022-11-02 NOTE — PROGRESS NOTES
RIVER GA received IB Message regarding patient inbound call to department  Patient requested a return call  RIVER GA returned patient's call  Patient reported that he has submitted his MA application and was informed that he will need to provide pay stubs and tax returns for last year  He was informed that it may take a few weeks to process his application  Patient requested to reschedule his Oncology appointment that was originally scheduled for today  He would prefer to wait to attend any further appointments until his MA application is processed  RIVER GA assisted with rescheduling his MA application for 46/17 at 1:15pm   Patient was receptive and thankful  Patient further explained that he is currently receiving support from two local [employer] and is hopeful that his SNAP portion of his application is approved  RIVER GA will continue to follow and provide psychosocial support as necessary

## 2022-11-02 NOTE — TELEPHONE ENCOUNTER
Appointment Cancellation Or Reschedule     Person calling in Patient    If other than patient calling, are they listed on the communication consent form? Provider Dr Scooter Brewer   Office Visit Date and Time 11/02 at 1:45pm   Office Visit Location [facility]   Did patient want to reschedule their office appointment? If so, when was it scheduled to? Yes, 11/30 at 1:15pm   Did you have STAR scheduled for this appointment? no   Do you need STAR set up for your new appointment? If yes, please send to "PATIENT RIDESHARE" pool for STAR rescheduling no   If you are cancelling appointment, can we notify STAR to cancel ride? If yes, please send to "PATIENT RIDESHARE" pool for STAR to cancel service no   Is this patient calling to reschedule an infusion appointment? no   When is their next infusion appointment? n/a   Is this patient a Chemo patient? no   Reason for Cancellation or Reschedule Insurance      If the patient is a treatment patient, please route this to the office nurse  If the patient is not on treatment, please route to the office MA  If the patient is a surgical oncology patient, please route to surg/onc clinical pool

## 2022-11-03 ENCOUNTER — TELEPHONE (OUTPATIENT)
Dept: GASTROENTEROLOGY | Facility: CLINIC | Age: 62
End: 2022-11-03

## 2022-11-03 NOTE — TELEPHONE ENCOUNTER
lmom confirming pt's colonoscopy/egd scheduled on 11/10/22 at [facility] with Dr Mikki Drew  Informed [facility] would be calling the day prior with the arrival time  Informed of clear liquid diet day prior as well as the bowel cleansing preparation  Informed would need a  the day of the procedure due to being under sedation  I asked pt to please call back if has not received instructions or if has any questions  Advised pt to contact insurance if has any questions regarding coverage for procedure

## 2022-11-09 ENCOUNTER — TELEPHONE (OUTPATIENT)
Dept: GASTROENTEROLOGY | Facility: CLINIC | Age: 62
End: 2022-11-09

## 2022-11-09 NOTE — TELEPHONE ENCOUNTER
Received message from Milton Jimenez informing pt's procedure scheduled tomorrow needs to be rescheduled due to pt not being aware needed to have covid testing 6 days prior to the procedure  I called ptalex asking him to please call back to reschedule  Will call pt again in one week if do not hear back from him

## 2022-11-15 ENCOUNTER — TELEPHONE (OUTPATIENT)
Dept: GASTROENTEROLOGY | Facility: AMBULARY SURGERY CENTER | Age: 62
End: 2022-11-15

## 2022-11-16 ENCOUNTER — PATIENT OUTREACH (OUTPATIENT)
Dept: CASE MANAGEMENT | Facility: OTHER | Age: 62
End: 2022-11-16

## 2022-11-16 NOTE — TELEPHONE ENCOUNTER
Called pt and received message the person you are trying to reach is not accepting calls at this time  Please try your call again later  Will call pt again in one week to try to r/s

## 2022-11-16 NOTE — PROGRESS NOTES
OSW attempted to reach [facility] today  Pt did not answer, and recording stated the person at this number is not accepting calls at this time  OSW called pt's brother, Wandy Galicia ([phone]) however he did not answer  OSW was able to leave a VM  Explained reason for call and provided return call information  Will continue to follow

## 2022-11-21 ENCOUNTER — TELEPHONE (OUTPATIENT)
Dept: GASTROENTEROLOGY | Facility: AMBULARY SURGERY CENTER | Age: 62
End: 2022-11-21

## 2022-11-22 ENCOUNTER — PATIENT OUTREACH (OUTPATIENT)
Dept: CASE MANAGEMENT | Facility: OTHER | Age: 62
End: 2022-11-22

## 2022-11-30 ENCOUNTER — TELEPHONE (OUTPATIENT)
Dept: CARDIAC SURGERY | Facility: CLINIC | Age: 62
End: 2022-11-30

## 2022-12-01 ENCOUNTER — PATIENT OUTREACH (OUTPATIENT)
Dept: CASE MANAGEMENT | Facility: OTHER | Age: 62
End: 2022-12-01

## 2022-12-01 NOTE — PROGRESS NOTES
OSW called pt as he requested  He stated he had a nice Thanksgiving and ate too much  He acknowledged he missed his appointment on Monday, stating "I just forgot about it and that's that " He stated he now wants to wait until after the holidays and January 1st to re-schedule his appointments  He stated this time of the year is too much and he has a lot to do  He wants to enjoy his Anthony with his daughter and grandkids  OSW noted he has no new appointments scheduled at this time  OSW explained this writer can follow up with him next month, and he requested a call after 1/1/23  He wished this writer a nice holiday  OSW updated oncology finance and nurse navigator of above

## 2022-12-29 ENCOUNTER — PATIENT OUTREACH (OUTPATIENT)
Dept: FAMILY MEDICINE CLINIC | Facility: CLINIC | Age: 62
End: 2022-12-29

## 2022-12-29 NOTE — PROGRESS NOTES
Chart review indicates RIVER was following patient who is in the process of applying for MA  Had submitted the application prior to 32/5 and was in the process of submitting pay stubs and tax returns at that time  OSW note indicates that patient did not attend oncology appt at the end of November and requested a call back after the holidays on 1/1/23 to reschedule the appointment  FARAZ outreached patient to follow up on MA approval, he did not answer, FARAZ to follow

## 2023-01-03 ENCOUNTER — PATIENT OUTREACH (OUTPATIENT)
Dept: CASE MANAGEMENT | Facility: OTHER | Age: 63
End: 2023-01-03

## 2023-01-03 NOTE — PROGRESS NOTES
OSW placed outreach call to Mr Néstor Del Cid today, however he did not answer LM on VM with return call information  Will continue to follow

## 2023-01-17 ENCOUNTER — PATIENT OUTREACH (OUTPATIENT)
Dept: CASE MANAGEMENT | Facility: OTHER | Age: 63
End: 2023-01-17

## 2023-01-17 NOTE — PROGRESS NOTES
OSW called pt x2 today  The first call that was placed, he answered but was driving home from his job and was unable to talk  He requested call later to allow time to get home  Called at 4:35 pm and received VM  Left a message requesting return call  At 4:40, pt returned call  He stated he has to get some sleep to prepare for the next job this evening and did not have time to talk to this writer  He stated he must get about 4 hours to sleep  He asked if he could call tomorrow during his lunch break  Explained that is not a problem and confirmed he has call back number  Will await call

## 2023-02-08 ENCOUNTER — PATIENT OUTREACH (OUTPATIENT)
Dept: CASE MANAGEMENT | Facility: OTHER | Age: 63
End: 2023-02-08

## 2023-02-08 NOTE — PROGRESS NOTES
OSW placed call to Mr Ann-Marie Cobos today  LM on VM  OSW will email oncology group as pt has not followed through with insurance changes or scheduling any new consultations after many months

## 2023-02-09 ENCOUNTER — PATIENT OUTREACH (OUTPATIENT)
Dept: CASE MANAGEMENT | Facility: OTHER | Age: 63
End: 2023-02-09

## 2023-02-09 NOTE — PROGRESS NOTES
OSW reviewed with oncology team and supervisor  Will remove pt from caseload at this time as the required attempts have been made to provide support and discuss next steps with follow up for oncology care with no response from pt

## 2023-03-23 ENCOUNTER — PATIENT OUTREACH (OUTPATIENT)
Dept: FAMILY MEDICINE CLINIC | Facility: CLINIC | Age: 63
End: 2023-03-23

## 2023-03-23 NOTE — PROGRESS NOTES
RIVER CM attempted outreach to Pt this day to follow-up on overall status and any/all referral needs  Per chart review, Pt had been working on applying for MA and application for same had been submitted prior to 11/2/22  However, previous SW unable to reach Pt to determine status of same  Call placed to Pt this day to follow-up on status and needs  Pt did not answer call this day  VM left requesting return call to review Pt's status and any/all needs at this time  RIVER CM will continue to follow and assist PRN

## 2023-04-06 ENCOUNTER — PATIENT OUTREACH (OUTPATIENT)
Dept: FAMILY MEDICINE CLINIC | Facility: CLINIC | Age: 63
End: 2023-04-06

## 2023-04-06 NOTE — LETTER
04/06/23    Dear Celia French,    I am a Care Manager with [address] [place] [id number] [phone]    We have made several attempts to call you by phone  We request that you reach back out to the [address], at [phone], so that we can assist with your care needs       Sincerely,         IRMA Verduzco  Outpatient Care Manager

## 2023-04-06 NOTE — PROGRESS NOTES
RIVER GA attempted second outreach to Pt this day to follow-up on status and needs  Per chart review, RIVER GA would like to determine the status of Pt's application for MA as same was noted to be submitted prior to 11/2/22  Second attempt call placed to Pt this day  However, Pt did not answer  VM left requesting return call to follow-up on Pt's status and needs  UTR letter to be sent to Pt d/t lack of response  RIVER GA to close referral if no response is received from Pt  RIVER GA will remain available for any future needs or referrals for Pt

## 2023-10-24 ENCOUNTER — TELEPHONE (OUTPATIENT)
Dept: GASTROENTEROLOGY | Facility: CLINIC | Age: 63
End: 2023-10-24

## 2023-10-24 NOTE — TELEPHONE ENCOUNTER
Pt stopped in office to schedule with Dr Treva Tam.  Scheduled with PA in [facility], will bring insurance card once received

## 2023-10-27 ENCOUNTER — TELEPHONE (OUTPATIENT)
Age: 63
End: 2023-10-27

## 2023-10-27 NOTE — TELEPHONE ENCOUNTER
Patients GI provider:  HAI Johnson    Number to return call: [phone]     Reason for call: Pt calling asking how to prepare for appt, wanting to know if anything needs to be done prior or if fasting is necessary.     Scheduled procedure/appointment date if applicable: Appt: 53/04/4865

## 2023-10-27 NOTE — TELEPHONE ENCOUNTER
Spoke with patient. Explained to patient that there is no preparation for his office visit with Kyara Huber on 11/17/23. Explained that she will just be talking with him about the reason he was referred and at that time, additional testing may be ordered for another day that he would need to possibly prep for.

## 2023-11-15 ENCOUNTER — TELEPHONE (OUTPATIENT)
Dept: GASTROENTEROLOGY | Facility: AMBULARY SURGERY CENTER | Age: 63
End: 2023-11-15

## 2023-11-16 ENCOUNTER — TELEPHONE (OUTPATIENT)
Dept: GASTROENTEROLOGY | Facility: CLINIC | Age: 63
End: 2023-11-16

## 2023-11-29 ENCOUNTER — NURSE TRIAGE (OUTPATIENT)
Age: 63
End: 2023-11-29

## 2023-11-29 NOTE — TELEPHONE ENCOUNTER
Reason for Disposition   Information only question and nurse able to answer    Answer Assessment - Initial Assessment Questions  1. REASON FOR CALL or QUESTION:      Pt. Calling asking to have a letter confirming his hepatology appointment coming up on 1/5/24, pt. Needs to give letter the court to verify that he has an appointment in order to be given a continuance, pt. Will  letter at [address] office tomorrow 11/30/23 by 11 am    Protocols used:  Information Only Call - No Triage-ADULT-[place]

## 2023-11-29 NOTE — LETTER
November 29, 2023    Mark Higgins  [address]      To Whom It May Concern: This letter is to confirm Mark Romankatiaon has an appointment with [facility] on 1/5/24. Please contact our office at [phone] with any questions or concerns.     Sincerely,       Lyndsay Hart RN

## 2023-11-29 NOTE — TELEPHONE ENCOUNTER
Letter completed as requested. [facility] GI office is aware patient will  there tomorrow.

## 2024-02-20 ENCOUNTER — APPOINTMENT (EMERGENCY)
Dept: RADIOLOGY | Facility: HOSPITAL | Age: 64
End: 2024-02-20
Payer: COMMERCIAL

## 2024-02-20 ENCOUNTER — HOSPITAL ENCOUNTER (EMERGENCY)
Facility: HOSPITAL | Age: 64
Discharge: HOME | End: 2024-02-20
Attending: EMERGENCY MEDICINE
Payer: COMMERCIAL

## 2024-02-20 VITALS
BODY MASS INDEX: 24.91 KG/M2 | RESPIRATION RATE: 18 BRPM | TEMPERATURE: 97.7 F | HEIGHT: 66 IN | WEIGHT: 155 LBS | DIASTOLIC BLOOD PRESSURE: 94 MMHG | HEART RATE: 86 BPM | OXYGEN SATURATION: 97 % | SYSTOLIC BLOOD PRESSURE: 172 MMHG

## 2024-02-20 DIAGNOSIS — S82.892A CLOSED FRACTURE OF LEFT ANKLE, INITIAL ENCOUNTER: Primary | ICD-10-CM

## 2024-02-20 PROCEDURE — 99283 EMERGENCY DEPT VISIT LOW MDM: CPT

## 2024-02-20 PROCEDURE — 99284 EMERGENCY DEPT VISIT MOD MDM: CPT | Performed by: EMERGENCY MEDICINE

## 2024-02-20 PROCEDURE — 73610 X-RAY EXAM OF ANKLE: CPT

## 2024-02-20 PROCEDURE — 73630 X-RAY EXAM OF FOOT: CPT

## 2024-02-20 NOTE — ED PROVIDER NOTES
History  Chief Complaint   Patient presents with    Ankle Pain     Right ankle pain after tripping on it on Sunday. Pt in triage with a bag of food and drink from [facility] and instructed not to eat or drink anything prior to being seen by provider     Patient is a 63-year-old white male with history of pancreatitis, hepatitis C who reports left lateral ankle pain.  States he injured the ankle 2 days ago when he was pushing a truck and slipped on icy surface twisting the ankle.  States he has been getting around using a cane.  Denies numbness or tingling in the left foot.  Denies head trauma or LOC.  Denies neck pain or back pain.  Denies any other complaints.        Prior to Admission Medications   Prescriptions Last Dose Informant Patient Reported? Taking?   polyethylene glycol (Golytely) 4000 mL solution   No No   Sig: Take 4,000 mL by mouth once for 1 dose Take 4000 mL by mouth once for 1 dose. Use as directed      Facility-Administered Medications: None       Past Medical History:   Diagnosis Date    Hepatitis C     Pancreatitis        Past Surgical History:   Procedure Laterality Date    IR BIOPSY LIVER MASS  8/31/2022       History reviewed. No pertinent family history.  I have reviewed and agree with the history as documented.    E-Cigarette/Vaping    E-Cigarette Use Never User      E-Cigarette/Vaping Substances    Nicotine No     THC No     CBD No     Flavoring No     Other No     Unknown No      Social History     Tobacco Use    Smoking status: Every Day     Current packs/day: 0.50     Types: Cigarettes    Smokeless tobacco: Never   Vaping Use    Vaping status: Never Used   Substance Use Topics    Alcohol use: Not Currently     Alcohol/week: 1.0 standard drink of alcohol     Types: 1 Cans of beer per week     Comment: one 12 oz. can yesterday 09/19/22, down from a case a day    Drug use: Never       Review of Systems   Respiratory:  Negative for shortness of breath.    Cardiovascular:  Negative for chest pain.    Gastrointestinal:  Negative for abdominal pain.   Musculoskeletal:  Positive for arthralgias and joint swelling.   Neurological:  Negative for numbness and headaches.       Physical Exam  Physical Exam  Vitals and nursing note reviewed.   Constitutional:       General: He is not in acute distress.     Appearance: Normal appearance. He is not ill-appearing, toxic-appearing or diaphoretic.   Cardiovascular:      Rate and Rhythm: Normal rate and regular rhythm.      Pulses: Normal pulses.      Heart sounds: Normal heart sounds.   Pulmonary:      Effort: Pulmonary effort is normal.      Breath sounds: Normal breath sounds.   Musculoskeletal:         General: Swelling and tenderness present.      Comments: Tenderness, swelling, ecchymosis left lateral ankle.  There is no tenderness over the proximal left fibula.  No tenderness over the base of the fifth metatarsal.  Remainder of the left leg exam is nontender and neurovascularly intact   Skin:     General: Skin is warm and dry.      Capillary Refill: Capillary refill takes less than 2 seconds.   Neurological:      Mental Status: He is alert.         Vital Signs  ED Triage Vitals   Temperature Pulse Respirations Blood Pressure SpO2   02/20/24 1324 02/20/24 1328 02/20/24 1324 02/20/24 1328 02/20/24 1328   97.7 °F (36.5 °C) 86 18 (!) 172/94 97 %      Temp src Heart Rate Source Patient Position - Orthostatic VS BP Location FiO2 (%)   -- -- -- -- --             Pain Score       02/20/24 1324       10 - Worst Possible Pain           Vitals:    02/20/24 1328   BP: (!) 172/94   Pulse: 86         Visual Acuity      ED Medications  Medications - No data to display    Diagnostic Studies  Results Reviewed       None                   XR foot 3+ views LEFT   ED Interpretation by Omar Teixeira PA-C (02/20 1450)   Calcaneal spur  No acute osseous abn in foot      XR ankle 3+ views LEFT   ED Interpretation by Omar Teixeira PA-C (02/20 1450)   Distal fibula fx                   Procedures  Procedures         ED Course                               SBIRT 20yo+      Flowsheet Row Most Recent Value   Initial Alcohol Screen: US AUDIT-C     1. How often do you have a drink containing alcohol? 0 Filed at: 02/20/2024 1324   2. How many drinks containing alcohol do you have on a typical day you are drinking?  0 Filed at: 02/20/2024 1324   3a. Male UNDER 65: How often do you have five or more drinks on one occasion? 0 Filed at: 02/20/2024 1324   3b. FEMALE Any Age, or MALE 65+: How often do you have 4 or more drinks on one occassion? 0 Filed at: 02/20/2024 1324   Audit-C Score 0 Filed at: 02/20/2024 1324   MAMIE: How many times in the past year have you...    Used an illegal drug or used a prescription medication for non-medical reasons? Never Filed at: 02/20/2024 1324                      Medical Decision Making  63-year-old white male presenting with left lateral ankle injury occurring 2 days ago when he slipped on ice.  Differential diagnosis includes ankle sprain, ankle fracture.  I ordered an x-ray of the left ankle.  I independently interpreted as distal fibula fracture.  Patient was offered Tylenol or Motrin for pain and declined.  An ambulatory referral was placed to orthopedics.  He was also advised he may  follow-up with orthopedic locally. Elevation advised. Return precautions reviewed.     Amount and/or Complexity of Data Reviewed  Radiology: ordered and independent interpretation performed.             Disposition  Final diagnoses:   Closed fracture of left ankle, initial encounter     Time reflects when diagnosis was documented in both MDM as applicable and the Disposition within this note       Time User Action Codes Description Comment    2/20/2024  3:08 PM Omar Teixeira Add [S82.892A] Closed fracture of left ankle, initial encounter           ED Disposition       ED Disposition   Discharge    Condition   Stable    Date/Time   Tue Feb 20, 2024 4748    Comment   Eric Yeager  discharge to home/self care.                   Follow-up Information       Follow up With Specialties Details Why Contact Info Additional Information    [facility]   [address]  [place] [address] [phone] [facility] [place], [address], [place], [place], [address] [phone]            Discharge Medication List as of 2/20/2024  3:15 PM        CONTINUE these medications which have NOT CHANGED    Details   polyethylene glycol (Golytely) 4000 mL solution Take 4,000 mL by mouth once for 1 dose Take 4000 mL by mouth once for 1 dose. Use as directed, Starting Mon 10/17/2022, Normal                 PDMP Review       None            ED Provider  Electronically Signed by             Omar Teixeira PA-C  02/20/24 1504       Omar Teixeira PA-C  02/20/24 5591

## 2024-02-20 NOTE — Clinical Note
Eric Yeager was seen and treated in our emergency department on 2/20/2024.                Diagnosis:     Eric  .    He may return on this date:     Eric Ramos is excused from work through Friday February 23rd     If you have any questions or concerns, please don't hesitate to call.      Omar Teixeira PA-C    ______________________________           _______________          _______________  Hospital Representative                              Date                                Time

## 2024-02-20 NOTE — DISCHARGE INSTRUCTIONS
Elevation advised    Follow up with [facility]. Call for appointment this week    Return to ED for increased pain, worsening symptoms

## 2024-02-21 ENCOUNTER — APPOINTMENT (OUTPATIENT)
Dept: RADIOLOGY | Facility: AMBULARY SURGERY CENTER | Age: 64
End: 2024-02-21
Attending: ORTHOPAEDIC SURGERY
Payer: COMMERCIAL

## 2024-02-21 ENCOUNTER — OFFICE VISIT (OUTPATIENT)
Dept: OBGYN CLINIC | Facility: CLINIC | Age: 64
End: 2024-02-21
Payer: COMMERCIAL

## 2024-02-21 VITALS — WEIGHT: 158 LBS | HEIGHT: 66 IN | BODY MASS INDEX: 25.39 KG/M2

## 2024-02-21 DIAGNOSIS — S82.832A OTHER CLOSED FRACTURE OF DISTAL END OF LEFT FIBULA, INITIAL ENCOUNTER: Primary | ICD-10-CM

## 2024-02-21 DIAGNOSIS — S82.892A CLOSED FRACTURE OF LEFT ANKLE, INITIAL ENCOUNTER: ICD-10-CM

## 2024-02-21 DIAGNOSIS — S82.832A OTHER CLOSED FRACTURE OF DISTAL END OF LEFT FIBULA, INITIAL ENCOUNTER: ICD-10-CM

## 2024-02-21 PROCEDURE — 27786 TREATMENT OF ANKLE FRACTURE: CPT | Performed by: ORTHOPAEDIC SURGERY

## 2024-02-21 PROCEDURE — 99204 OFFICE O/P NEW MOD 45 MIN: CPT | Performed by: ORTHOPAEDIC SURGERY

## 2024-02-21 PROCEDURE — 73600 X-RAY EXAM OF ANKLE: CPT

## 2024-02-21 NOTE — PROGRESS NOTES
James R Lachman, M.D.  Attending, Orthopaedic Surgery  Foot and Ankle  [facility]      ORTHOPAEDIC FOOT AND ANKLE CLINIC VISIT     Assessment:     Encounter Diagnoses   Name Primary?    Other closed fracture of distal end of left fibula, initial encounter Yes    Closed fracture of left ankle, initial encounter             Plan:   The patient verbalized understanding of exam findings and treatment plan. We engaged in the shared decision-making process and treatment options were discussed at length with the patient. Surgical and conservative management discussed today along with risks and benefits.  Patient has a minimally displaced distal fibula fracture sustained on 2/20/24 with a medial clear space measurement of 3.5 mm. Amenable to non operative treatments.   He was placed in a short leg cast today in the office. He may be WBAT in the cast with a cast shoe  Begin Aspirin 325 MG for dvt ppx  Vit D and Calcium  Out of cast next visit with repeat weight bearing x rays of the left ankle. Plan to transition into CAM boot  Return in about 3 weeks (around 3/13/2024).    Radiograhpic stress exam    Performed by: James R Lachman, MD  Authorized by: James R Lachman, MD    Universal Protocol:     Verbal consent obtained?: Yes      Risks and benefits: Risks, benefits and alternatives were discussed      Radiology Images displayed and confirmed. If images not available, report reviewed: Yes      Patient identity confirmed:  Verbally with patient  Condition:  Instability  Laterality:  Left  Patient Tolerance:  Patient tolerated the procedure well with no immediate complications  Procedure And Findings:      Radiographic stress x ray of left ankle  Fracture / Dislocation Treatment    Date/Time: 2/21/2024 10:30 AM    Performed by: James R Lachman, MD  Authorized by: James R Lachman, MD    Patient Location:  Clinic  [place] Protocol:  Consent: Verbal consent obtained.  Risks and benefits: risks,  benefits and alternatives were discussed  Consent given by: patient  Site marked: the operative site was marked  Radiology Images displayed and confirmed. If images not available, report reviewed: imaging studies available  Patient identity confirmed: verbally with patient    Injury location:  Ankle  Location details:  Left ankle  Injury type:  Fracture  Fracture type: lateral malleolus    Fracture type: lateral malleolus    Neurovascular status: Neurovascularly intact    Distal perfusion: normal    Neurological function: normal    Range of motion: reduced    Immobilization:  Cast  Cast type:  Short leg  Supplies used:  Aluminum splint, cotton padding and fiberglass  Neurovascular status: Neurovascularly intact    Distal perfusion: normal    Neurological function: normal    Range of motion: unchanged    Patient tolerance:  Patient tolerated the procedure well with no immediate complications          History of Present Illness:   Chief Complaint:   Chief Complaint   Patient presents with    Left Ankle - Pain     Broken ankle per patient. In a splint and wheel chair.      Eric Yeager is a 63 y.o. male who is being seen for left ankle fracture. Patient slip and fall injuring his left ankle on 2/20/24.  Pain is localized at lateral ankle with minimal radiating and described as sharp and severe. Patient denies numbness, tingling or radicular pain.  Denies history of neuropathy.  Patient does smoke, does not have diabetes and does not take blood thinners.  Patient denies family history of anesthesia complications and has not had any complications with anesthesia.     Pain/symptom timing:  Worse during the day when active  Pain/symptom context:  Worse with activites and work  Pain/symptom modifying factors:  Rest makes better, activities make worse  Pain/symptom associated signs/symptoms: none    Prior treatment   NSAIDsYes   Injections No   Bracing/Orthotics Yes    Physical Therapy No     Orthopedic Surgical History:   See  "below    Past Medical, Surgical and Social History:  Past Medical History:  has a past medical history of Hepatitis C and Pancreatitis.  Problem List: does not have any pertinent problems on file.  Past Surgical History:  has a past surgical history that includes IR biopsy liver mass (8/31/2022).  Family History: family history is not on file.  Social History:  reports that he has been smoking. He has never used smokeless tobacco. He reports that he does not currently use alcohol after a past usage of about 1.0 standard drink of alcohol per week. He reports that he does not use drugs.  Current Medications: has a current medication list which includes the following prescription(s): golytely.  Allergies: has No Known Allergies.     Review of Systems:  General- denies fever/chills  HEENT- denies hearing loss or sore throat  Eyes- denies eye pain or visual disturbances, denies red eyes  Respiratory- denies cough or SOB  Cardio- denies chest pain or palpitations  GI- denies abdominal pain  Endocrine- denies urinary frequency  Urinary- denies pain with urination  Musculoskeletal- Negative except noted above  Skin- denies rashes or wounds  Neurological- denies dizziness or headache  Psychiatric- denies anxiety or difficulty concentrating    Physical Exam:   Ht 5' 6\" (1.676 m)   Wt 71.7 kg (158 lb)   BMI 25.50 kg/m²   General/Constitutional: No apparent distress: well-nourished and well developed.  Eyes: normal ocular motion  Cardio: RRR, Normal S1S2, No m/r/g  Lymphatic: No appreciable lymphadenopathy  Respiratory: Non-labored breathing, CTA b/l no w/c/r  Vascular: No edema, swelling or tenderness, except as noted in detailed exam.  Integumentary: No impressive skin lesions present, except as noted in detailed exam.  Neuro: No ataxia or tremors noted  Psych: Normal mood and affect, oriented to person, place and time. Appropriate affect.  Musculoskeletal: Normal, except as noted in detailed exam and in HPI.    Examination "    Left    Gait Non weight bearing   Musculoskeletal Tender to palpation at lateral ankle    Skin Normal.      Nails Normal    Range of Motion  Not assessed due to fracture    Stability Not assessed due to fracture    Muscle Strength Not assessed due to fracture    Neurologic Normal    Sensation Intact to light touch throughout sural, saphenous, superficial peroneal, deep peroneal and medial/lateral plantar nerve distributions.  Glendo-Edgar 5.07 filament (10g) testing deferred.    Cardiovascular Brisk capillary refill < 2 seconds,intact DP and PT pulses    Special Tests None      Imaging Studies:   1 views of the left ankle, stress view were taken, reviewed and interpreted independently that demonstrate medial clear space widening of 3.5 mm. Reviewed by me personally.        James R. Lachman, MD  Foot & Ankle Surgery   Department of Orthopaedic Surgery  [facility]      I personally performed the service.    James R. Lachman, MD    Scribe Attestation      I,:  Joe Cee am acting as a scribe while in the presence of the attending physician.:       I,:  James R Lachman, MD personally performed the services described in this documentation    as scribed in my presence.:

## 2024-02-21 NOTE — LETTER
February 21, 2024     Patient: Eric Yeager  YOB: 1960  Date of Visit: 2/21/2024      To Whom it May Concern:    Eric Yeager is under my professional care. Eric was seen in my office on 2/21/2024. Eric is unable to return to work due to ankle fracture until his next follow up visit.    If you have any questions or concerns, please don't hesitate to call.         Sincerely,          James R Lachman, MD        CC: No Recipients

## 2024-02-21 NOTE — PATIENT INSTRUCTIONS
"WBAT in short leg cast     Begin aspirin to prevent blood clots  Purchase a compression stocking (Knee high, 20-30mm Hg) to be worn at all times while awake for your next visit when the cast comes off.  Recommend taking the following supplements: Vitamin D3-4000 units per day and Calcium 1200 mg per day. This will help with bone healing.    Care of Casts and Splints    Casts and splints support and protect injured bones and soft tissue. When you break a bone, your doctor will put the pieces back together in the right position. Casts and splints hold the bones in place while they heal. They also reduce pain, swelling, and muscle spasm.    In some cases, splints and casts are applied following surgery.    Splints or \"half-casts\" provide less support than casts. However, splints can be adjusted to accommodate swelling from injuries easier than enclosed casts. Your doctor will decide which type of support is best for you.    Types of Splints and Casts  Casts are custom-made. They must fit the shape of your injured limb correctly to provide the best support. Casts can be made of plaster or fiberglass -- a plastic that can be shaped.  Splints or half-casts can also be custom-made, especially if an exact fit is necessary. Other times, a ready-made splint will be used. These off-the-shelf splints are made in a variety of shapes and sizes, and are much easier and faster to use. They have Velcro straps which make the splints easy to put on, take off, and adjust.    Materials  Fiberglass or plaster materials form the hard supportive layer in splints and casts.  Fiberglass is lighter in weight and stronger than plaster. In addition, x-rays can \"see through\" fiberglass better than through plaster. This is important because your doctor will probably schedule additional x-rays after your splint or cast has been applied. X-rays can show whether the bones are healing well or have moved out of place.  Plaster is less expensive than " "fiberglass and shapes better than fiberglass for some uses.    Application  Both fiberglass and plaster splints and casts use padding, usually cotton, as a protective layer next to the skin. Both materials come in strips or rolls which are dipped in water and applied over the padding covering the injured area.  The splint or cast must fit the shape of the injured arm or leg correctly to provide the best possible support. Generally, the splint or cast also covers the joint above and below the broken bone.  In many cases, a splint is applied to a fresh injury first. As swelling subsides, a full cast may replace the splint.  Sometimes, it may be necessary to replace a cast as swelling goes down and the cast gets \"too big.\" As a fracture heals, the cast may be replaced by a splint to make it easier to perform physical therapy exercises.        Apply ice to the splint or cast and elevate your leg to reduce swelling.  Warning Signs  Swelling can create a lot of pressure under your cast. This can lead to problems. If you experience any of the following symptoms, contact your doctor's office immediately for advice.  Increased pain and the feeling that the splint or cast is too tight. This may be caused by swelling.   Numbness and tingling in your hand or foot. This may be caused by too much pressure on the nerves.   Burning and stinging. This may be caused by too much pressure on the skin.   Excessive swelling below the cast. This may mean the cast is slowing your blood circulation.   Loss of active movement of toes or fingers. This requires an urgent evaluation by your doctor.      Getting Used to a Splint or Cast  Swelling due to your injury may cause pressure in your splint or cast for the first 48 to 72 hours. This may cause your injured arm or leg to feel snug or tight in the splint or cast. If you have a splint, your doctor will show you how to adjust it to accommodate the swelling.  It is very important to keep the " "swelling down. This will lessen pain and help your injury heal. To help reduce swelling:  Elevate. It is very important to elevate your injured arm or leg for the first 24 to 72 hours. Prop your injured arm or leg up above your heart by putting it on pillows or some other support. You will have to recline if the splint or cast is on your leg. Elevation allows clear fluid and blood to drain \"downhill\" to your heart.   Exercise. Move your uninjured, but swollen fingers or toes gently and often. Moving them often will prevent stiffness.   Ice. Apply ice to the splint or cast. Place the ice in a dry plastic bag or ice pack and loosely wrap it around the splint or cast at the level of the injury. Ice that is packed in a rigid container and touches the cast at only one point will not be effective.  Taking Care of Your Splint or Cast  Your doctor will explain any restrictions on using your injured arm or leg while it is healing. You must follow your doctor's instructions carefully to make sure your bone heals properly. The following information provides general guidelines only, and is not a substitute for your doctor's advice.  After you have adjusted to your splint or cast for a few days, it is important to keep it in good condition. This will help your recovery.  Keep your splint or cast dry. Moisture weakens plaster and damp padding next to the skin can cause irritation. Use two layers of plastic or purchase waterproof shields to keep your splint or cast dry while you shower or bathe. Even if the cast is covered, do not submerge it or hold it under running water. A small pinhole in the cast cover can cause the injury to get soaked.   Walking casts. Do not walk on a \"walking cast\" until it is completely dry and hard. It takes about one hour for fiberglass, and two to three days for plaster to become hard enough to walk on.   Avoid dirt. Keep dirt, sand, and powder away from the inside of your splint or cast.   Padding. " "Do not pull out the padding from your splint or cast.   Itching. Do not stick objects such as coat hangers inside the splint or cast to scratch itching skin. Do not apply powders or deodorants to itching skin. If itching persists, contact your doctor.   Trimming. Do not break off rough edges of the cast or trim the cast before asking your doctor.   Skin. Inspect the skin around the cast. If your skin becomes red or raw around the cast, contact your doctor.   Inspect the cast regularly. If it becomes cracked or develops soft spots, contact your doctor's office.  Use common sense. You have a serious injury and you must protect your cast from damage so it can protect your injury while it heals.  After the initial swelling has subsided, proper splint or cast support will usually allow you to continue your daily activities with a minimum of inconvenience.  Cast Removal  Never remove the cast yourself. You may cut your skin or prevent proper healing of your injury.  Your doctor will use a cast saw to remove your cast. The saw vibrates, but does not rotate. If the blade of the saw touches the padding inside the hard shell of the cast, the padding will vibrate with the blade and will protect your skin. Cast saws make noise and may feel \"hot\" from friction, but will not harm you -- \"their bark is worse than their bite.\"  If you do feel pain while the cast is being removed, let your doctor or an assistant know and they will be able to make adjustments.  The saw vibrates but does not rotate. Cast saws make noise but will not harm you.    "

## 2024-03-07 ENCOUNTER — TELEPHONE (OUTPATIENT)
Age: 64
End: 2024-03-07

## 2024-03-15 ENCOUNTER — TELEPHONE (OUTPATIENT)
Age: 64
End: 2024-03-15

## 2024-03-15 NOTE — TELEPHONE ENCOUNTER
Caller: Chandler lafleur- [employer]     Doctor: Lachman    Reason for call: Asked if we received paperwork from them,Patient told them he dropped off paperwork.  I did not see any paperwork from [employer] they stated that is a different company, they asked for the fax number and asked if it could be done today.     Call back#: [phone]

## 2024-03-19 ENCOUNTER — OFFICE VISIT (OUTPATIENT)
Dept: OBGYN CLINIC | Facility: CLINIC | Age: 64
End: 2024-03-19
Payer: COMMERCIAL

## 2024-03-19 ENCOUNTER — APPOINTMENT (OUTPATIENT)
Dept: RADIOLOGY | Facility: AMBULARY SURGERY CENTER | Age: 64
End: 2024-03-19
Attending: ORTHOPAEDIC SURGERY
Payer: COMMERCIAL

## 2024-03-19 VITALS — WEIGHT: 158 LBS | BODY MASS INDEX: 25.39 KG/M2 | HEIGHT: 66 IN

## 2024-03-19 DIAGNOSIS — Z01.89 ENCOUNTER FOR LOWER EXTREMITY COMPARISON IMAGING STUDY: ICD-10-CM

## 2024-03-19 DIAGNOSIS — S82.832A OTHER CLOSED FRACTURE OF DISTAL END OF LEFT FIBULA, INITIAL ENCOUNTER: ICD-10-CM

## 2024-03-19 DIAGNOSIS — S82.832A OTHER CLOSED FRACTURE OF DISTAL END OF LEFT FIBULA, INITIAL ENCOUNTER: Primary | ICD-10-CM

## 2024-03-19 PROCEDURE — 73600 X-RAY EXAM OF ANKLE: CPT

## 2024-03-19 PROCEDURE — 73610 X-RAY EXAM OF ANKLE: CPT

## 2024-03-19 PROCEDURE — 99213 OFFICE O/P EST LOW 20 MIN: CPT | Performed by: ORTHOPAEDIC SURGERY

## 2024-03-19 NOTE — PROGRESS NOTES
"      James R Lachman, M.D.  Attending, Orthopaedic Surgery  Foot and Ankle  [facility]      ORTHOPAEDIC FOOT AND ANKLE CLINIC VISIT     Assessment:     Encounter Diagnoses   Name Primary?    Other closed fracture of distal end of left fibula, initial encounter Yes    Encounter for lower extremity comparison imaging study             Plan:   The patient verbalized understanding of exam findings and treatment plan. We engaged in the shared decision-making process and treatment options were discussed at length with the patient. Surgical and conservative management discussed today along with risks and benefits.  Patient with minimally displaced distal fibula fracture sustained on 2/20/24 with a medial clear space measurement of 3.5 mm. Amenable to non operative treatments.   Repeat Xrays today reveal ***  Transition to cam boot   Continue Aspirin 325 mg BID for DVT prophylaxis  Continue Vit D and Calcium  repeat x rays next visit of the left ankle weight bearing   No follow-ups on file.      History of Present Illness:   Chief Complaint:   Chief Complaint   Patient presents with    Left Ankle - Follow-up     Eric Yeager is a 63 y.o. male who is being seen in follow-up for left fibula. When we last saw he we recommended WBAT in short left cast with cast shoe.  Pain has *** improved. Residual pain is localized at *** with minimal radiating and described as sharp and severe.      Pain/symptom timing:  Worse during the day when active  Pain/symptom context:  Worse with activites and work  Pain/symptom modifying factors:  Rest makes better, activities make worse  Pain/symptom associated signs/symptoms: none    Prior treatment   NSAIDs{ :76799::\"Yes\"}   Injections { :26699::\"Yes\"}   Bracing/Orthotics { :29126::\"Yes\"}    Physical Therapy { :25207::\"Yes\"}     Orthopedic Surgical History:   See below    Past Medical, Surgical and Social History:  Past Medical History:  has a past medical history of Hepatitis " "C and Pancreatitis.  Problem List: does not have any pertinent problems on file.  Past Surgical History:  has a past surgical history that includes IR biopsy liver mass (8/31/2022).  Family History: family history is not on file.  Social History:  reports that he has been smoking. He has never used smokeless tobacco. He reports that he does not currently use alcohol after a past usage of about 1.0 standard drink of alcohol per week. He reports that he does not use drugs.  Current Medications: has a current medication list which includes the following prescription(s): golytely.  Allergies: has No Known Allergies.     Review of Systems:  General- denies fever/chills  HEENT- denies hearing loss or sore throat  Eyes- denies eye pain or visual disturbances, denies red eyes  Respiratory- denies cough or SOB  Cardio- denies chest pain or palpitations  GI- denies abdominal pain  Endocrine- denies urinary frequency  Urinary- denies pain with urination  Musculoskeletal- Negative except noted above  Skin- denies rashes or wounds  Neurological- denies dizziness or headache  Psychiatric- denies anxiety or difficulty concentrating    Physical Exam:   Ht 5' 6\" (1.676 m)   Wt 71.7 kg (158 lb)   BMI 25.50 kg/m²   General/Constitutional: No apparent distress: well-nourished and well developed.  Eyes: normal ocular motion  Lymphatic: No appreciable lymphadenopathy  Respiratory: Non-labored breathing  Vascular: No edema, swelling or tenderness, except as noted in detailed exam.  Integumentary: No impressive skin lesions present, except as noted in detailed exam.  Neuro: No ataxia or tremors noted  Psych: Normal mood and affect, oriented to person, place and time. Appropriate affect.  Musculoskeletal: Normal, except as noted in detailed exam and in HPI.    Examination    Left    Gait { :02792::\"Normal\"}   Musculoskeletal Tender to palpation at ***    Skin Normal.  ***Well-healed incisions.    Nails Normal    Range of Motion  *** " degrees dorsiflexion, *** degrees plantarflexion  Subtalar motion: ***    Stability Stable    Muscle Strength 5/5 tibialis anterior  5/5 gastrocnemius-soleus  5/5 posterior tibialis  5/5 peroneal/eversion strength  5/5 EHL  5/5 FHL    Neurologic Normal    Sensation *** Intact to light touch throughout sural, saphenous, superficial peroneal, deep peroneal and medial/lateral plantar nerve distributions.  [place]gar 5.07 filament (10g) testing *** deferred.    Cardiovascular Brisk capillary refill < 2 seconds,intact DP and PT pulses    Special Tests None      Imaging Studies:   No new imaging    3 views of the Left *** were obtained, reviewed and interpreted independently which demonstrate ***. Reviewed by me personally.    MRI available for review of left *** which demonstrates ***. Reviewed by me personally.    CT available for review of Left *** which shows ***. Reviewed by me personally.        James R. Lachman, MD  Foot & Ankle Surgery   Department of Orthopaedic Surgery  [facility]      I personally performed the service.    James R. Lachman, MD

## 2024-03-19 NOTE — PATIENT INSTRUCTIONS
Weightbearing as tolerated in CAM boot  Do not need to wear the boot for sleep or showering but should wear it any time you are walking on it. Total of 3 weeks and then initiate PT    Recommend taking the following supplements: Vitamin D 4000 units per day and Calcium 1200 mg per day. This will help with bone healing.     Avoid NSAIDs like Motrin/Aleve/Ibuprofen.  Avoid steroids/nicotine products/ rheumatoid medications like DMARDs if possible.    Aspirin 81 mg BID for blood clot prevention.      Knee high compression stocking 20-30mm HG of pressure

## 2024-03-19 NOTE — PROGRESS NOTES
James R Lachman, M.D.  Attending, Orthopaedic Surgery  Foot and Ankle  [facility]      ORTHOPAEDIC FOOT AND ANKLE CLINIC VISIT     Assessment:     Encounter Diagnoses   Name Primary?    Other closed fracture of distal end of left fibula, initial encounter Yes    Encounter for lower extremity comparison imaging study         Plan:   The patient verbalized understanding of exam findings and treatment plan. We engaged in the shared decision-making process and treatment options were discussed at length with the patient. Surgical and conservative management discussed today along with risks and benefits.  His Xrays remain stable today, ankle well aligned. Fracture healing  Will transition out of the cast into the walking boot today.  Vitamin D and calcium.  Aspirin to prevent blood clots.  Return in about 3 weeks (around 4/9/2024) for Recheck.      History of Present Illness:   Chief Complaint:   Chief Complaint   Patient presents with    Left Ankle - Follow-up     Erci Yeager is a 63 y.o. male who is being seen in follow-up for left fibula fracture. When we last saw he we recommended WBAT in Cast.  Pain has continued to improve. Residual pain is localized at fracture site with minimal radiating.     Pain/symptom timing:  Worse during the day when active  Pain/symptom context:  Worse with activites and work  Pain/symptom modifying factors:  Rest makes better, activities make worse  Pain/symptom associated signs/symptoms: none    Prior treatment   NSAIDsYes   Injections No   Bracing/Orthotics Yes    Physical Therapy No     Orthopedic Surgical History:   See below    Past Medical, Surgical and Social History:  Past Medical History:  has a past medical history of Hepatitis C and Pancreatitis.  Problem List: does not have any pertinent problems on file.  Past Surgical History:  has a past surgical history that includes IR biopsy liver mass (8/31/2022).  Family History: family history is not on  "file.  Social History:  reports that he has been smoking. He has never used smokeless tobacco. He reports that he does not currently use alcohol after a past usage of about 1.0 standard drink of alcohol per week. He reports that he does not use drugs.  Current Medications: has a current medication list which includes the following prescription(s): golytely.  Allergies: has No Known Allergies.     Review of Systems:  General- denies fever/chills  HEENT- denies hearing loss or sore throat  Eyes- denies eye pain or visual disturbances, denies red eyes  Respiratory- denies cough or SOB  Cardio- denies chest pain or palpitations  GI- denies abdominal pain  Endocrine- denies urinary frequency  Urinary- denies pain with urination  Musculoskeletal- Negative except noted above  Skin- denies rashes or wounds  Neurological- denies dizziness or headache  Psychiatric- denies anxiety or difficulty concentrating    Physical Exam:   Ht 5' 6\" (1.676 m)   Wt 71.7 kg (158 lb)   BMI 25.50 kg/m²   General/Constitutional: No apparent distress: well-nourished and well developed.  Eyes: normal ocular motion  Lymphatic: No appreciable lymphadenopathy  Respiratory: Non-labored breathing  Vascular: No edema, swelling or tenderness, except as noted in detailed exam.  Integumentary: No impressive skin lesions present, except as noted in detailed exam.  Neuro: No ataxia or tremors noted  Psych: Normal mood and affect, oriented to person, place and time. Appropriate affect.  Musculoskeletal: Normal, except as noted in detailed exam and in HPI.    Examination    Left    Gait Antalgic   Musculoskeletal Tender to palpation at fracture site    Skin Normal.     Nails Normal    Range of Motion  20 degrees dorsiflexion, 30 degrees plantarflexion  Subtalar motion: normal    Stability Stable    Muscle Strength 5/5 tibialis anterior  5/5 gastrocnemius-soleus  5/5 posterior tibialis  5/5 peroneal/eversion strength  5/5 EHL  5/5 FHL    Neurologic Normal  "   Sensation  Intact to light touch throughout sural, saphenous, superficial peroneal, deep peroneal and medial/lateral plantar nerve distributions.  [place]-Edgar 5.07 filament (10g) testing deferred.    Cardiovascular Brisk capillary refill < 2 seconds,intact DP and PT pulses    Special Tests None      Imaging Studies:     3 views of the Left ankle were obtained, reviewed and interpreted independently which demonstrate fibular fracture stable alignment with fracture healing. Reviewed by me personally.    James R. Lachman, MD  Foot & Ankle Surgery   Department of Orthopaedic Surgery  [facility]      I personally performed the service.    James R. Lachman, MD

## 2024-03-21 ENCOUNTER — TELEPHONE (OUTPATIENT)
Dept: OBGYN CLINIC | Facility: HOSPITAL | Age: 64
End: 2024-03-21

## 2024-03-21 NOTE — TELEPHONE ENCOUNTER
Caller: Raegan- [employer]    Doctor: Dr. Lachman    Reason for call: We received a State Disability form from [employer], they stated they have not received it back. I did advise office has 10-14 business days to complete.    Raegan verbalized understanding.     I did not see in the log, scanned into chart on 03/18/24.    Call back#: [phone]

## 2024-04-09 ENCOUNTER — APPOINTMENT (OUTPATIENT)
Dept: RADIOLOGY | Facility: AMBULARY SURGERY CENTER | Age: 64
End: 2024-04-09
Attending: ORTHOPAEDIC SURGERY
Payer: COMMERCIAL

## 2024-04-09 ENCOUNTER — OFFICE VISIT (OUTPATIENT)
Dept: OBGYN CLINIC | Facility: CLINIC | Age: 64
End: 2024-04-09

## 2024-04-09 VITALS — HEIGHT: 66 IN | WEIGHT: 158 LBS | BODY MASS INDEX: 25.39 KG/M2

## 2024-04-09 DIAGNOSIS — S82.832A OTHER CLOSED FRACTURE OF DISTAL END OF LEFT FIBULA, INITIAL ENCOUNTER: ICD-10-CM

## 2024-04-09 DIAGNOSIS — S82.832A OTHER CLOSED FRACTURE OF DISTAL END OF LEFT FIBULA, INITIAL ENCOUNTER: Primary | ICD-10-CM

## 2024-04-09 DIAGNOSIS — Z01.89 ENCOUNTER FOR LOWER EXTREMITY COMPARISON IMAGING STUDY: ICD-10-CM

## 2024-04-09 PROCEDURE — 73600 X-RAY EXAM OF ANKLE: CPT

## 2024-04-09 PROCEDURE — 73610 X-RAY EXAM OF ANKLE: CPT

## 2024-04-09 PROCEDURE — 99024 POSTOP FOLLOW-UP VISIT: CPT | Performed by: ORTHOPAEDIC SURGERY

## 2024-04-09 NOTE — PATIENT INSTRUCTIONS
You may begin weaning your boot and transitioning to a sneaker (Today). It is important to do this gradually to avoid aggravating the healing process.    Today, you may come out of the boot into a sneaker for 1 hours.  2. Tomorrow, you may come out of the boot into a sneaker for 2 hours,  3. The next day, you may come out of the boot into a sneaker for 3 hours.  4. Continue this (adding 1 hours per day) as you tolerate. For example, if you do 6 hours out of the boot into a sneaker and your foot swells more than usual at night and it is difficult to control the discomfort, do not advance to 7 hours the next day, stay at 6 hours until you are able to tolerate it.    It is essential to follow this protocol and not modify this.  No matter when you begin weaning the boot, it will be difficult and there will be swelling and soreness.  If you spend more time than is recommended in the boot, this process becomes longer and more painful.    Elevation, Ice and tylenol and staying off of it at night will be important to aide in this transition out of the boot. Swelling and soreness are normal as you begin to do more with the injured leg.    May DC aspirin/lovenox, no longer needed.  Continue PT  Compression stocking (Knee high, 20-30mm Hg) to be worn at all times while awake.  Recommend taking the following supplements: Vitamin D3-4000 units per day and Calcium 1200 mg per day. This will help with bone healing.

## 2024-04-09 NOTE — PROGRESS NOTES
James R Lachman, M.D.  Attending, Orthopaedic Surgery  Foot and Ankle  [facility]      ORTHOPAEDIC FOOT AND ANKLE CLINIC VISIT     Assessment:     Encounter Diagnoses   Name Primary?    Other closed fracture of distal end of left fibula, initial encounter Yes    Encounter for lower extremity comparison imaging study             Plan:   The patient verbalized understanding of exam findings and treatment plan. We engaged in the shared decision-making process and treatment options were discussed at length with the patient. Surgical and conservative management discussed today along with risks and benefits.  Nonop treatment of left fibula fracture  Xrays stable today  Nicotine user- likely will slow bone healing  Work restrictions provided  See back here in 8 weeks for another Xrays      History of Present Illness:   Chief Complaint:   Chief Complaint   Patient presents with    Left Ankle - Follow-up, Swelling, Pain     Eric Yeager is a 63 y.o. male who is being seen in follow-up for left fibula fracture. When we last saw he we recommended CAM boot.  Pain has improved. Residual pain is localized at fracture site with minimal radiating.      Pain/symptom timing:  Worse during the day when active  Pain/symptom context:  Worse with activites and work  Pain/symptom modifying factors:  Rest makes better, activities make worse  Pain/symptom associated signs/symptoms: none    Prior treatment   NSAIDsNo   Injections No   Bracing/Orthotics Yes    Physical Therapy No     Orthopedic Surgical History:   See below    Past Medical, Surgical and Social History:  Past Medical History:  has a past medical history of Hepatitis C and Pancreatitis.  Problem List: does not have any pertinent problems on file.  Past Surgical History:  has a past surgical history that includes IR biopsy liver mass (8/31/2022).  Family History: family history is not on file.  Social History:  reports that he has been smoking. He has  "never used smokeless tobacco. He reports that he does not currently use alcohol after a past usage of about 1.0 standard drink of alcohol per week. He reports that he does not use drugs.  Current Medications: has a current medication list which includes the following prescription(s): golytely.  Allergies: has No Known Allergies.     Review of Systems:  General- denies fever/chills  HEENT- denies hearing loss or sore throat  Eyes- denies eye pain or visual disturbances, denies red eyes  Respiratory- denies cough or SOB  Cardio- denies chest pain or palpitations  GI- denies abdominal pain  Endocrine- denies urinary frequency  Urinary- denies pain with urination  Musculoskeletal- Negative except noted above  Skin- denies rashes or wounds  Neurological- denies dizziness or headache  Psychiatric- denies anxiety or difficulty concentrating    Physical Exam:   Ht 5' 6\" (1.676 m)   Wt 71.7 kg (158 lb)   BMI 25.50 kg/m²   General/Constitutional: No apparent distress: well-nourished and well developed.  Eyes: normal ocular motion  Lymphatic: No appreciable lymphadenopathy  Respiratory: Non-labored breathing  Vascular: No edema, swelling or tenderness, except as noted in detailed exam.  Integumentary: No impressive skin lesions present, except as noted in detailed exam.  Neuro: No ataxia or tremors noted  Psych: Normal mood and affect, oriented to person, place and time. Appropriate affect.  Musculoskeletal: Normal, except as noted in detailed exam and in HPI.    Examination    Left    Gait Normal   Musculoskeletal Tender to palpation at lateral malleolus    Skin Normal.      Nails Normal    Range of Motion  20 degrees dorsiflexion, 30 degrees plantarflexion  Subtalar motion: normal    Stability Stable    Muscle Strength 5/5 tibialis anterior  5/5 gastrocnemius-soleus  5/5 posterior tibialis  5/5 peroneal/eversion strength  5/5 EHL  5/5 FHL    Neurologic Normal    Sensation  Intact to light touch throughout sural, saphenous, " superficial peroneal, deep peroneal and medial/lateral plantar nerve distributions.  [place]gar 5.07 filament (10g) testing deferred.    Cardiovascular Brisk capillary refill < 2 seconds,intact DP and PT pulses    Special Tests None      Imaging Studies:     3 views of the Left ankle were obtained, reviewed and interpreted independently which demonstrate no interval displacement. Reviewed by me personally.        James R. Lachman, MD  Foot & Ankle Surgery   Department of Orthopaedic Surgery  [facility]      I personally performed the service.    James R. Lachman, MD

## 2024-06-04 ENCOUNTER — OFFICE VISIT (OUTPATIENT)
Dept: OBGYN CLINIC | Facility: CLINIC | Age: 64
End: 2024-06-04
Payer: COMMERCIAL

## 2024-06-04 VITALS — BODY MASS INDEX: 24.8 KG/M2 | WEIGHT: 158 LBS | HEIGHT: 67 IN

## 2024-06-04 DIAGNOSIS — S82.832A OTHER CLOSED FRACTURE OF DISTAL END OF LEFT FIBULA, INITIAL ENCOUNTER: Primary | ICD-10-CM

## 2024-06-04 DIAGNOSIS — Z01.89 ENCOUNTER FOR LOWER EXTREMITY COMPARISON IMAGING STUDY: ICD-10-CM

## 2024-06-04 PROCEDURE — 99213 OFFICE O/P EST LOW 20 MIN: CPT | Performed by: ORTHOPAEDIC SURGERY

## 2024-06-04 NOTE — LETTER
June 4, 2024     Patient: Eric Yeager  YOB: 1960  Date of Visit: 6/4/2024      To Whom it May Concern:    Eric Yeager is under my professional care. Eric was seen in my office on 6/4/2024. Eric may return to work without restrictions or limitations.     If you have any questions or concerns, please don't hesitate to call.         Sincerely,          James R Lachman, MD        CC: No Recipients

## 2024-06-04 NOTE — PROGRESS NOTES
James R Lachman, M.D.  Attending, Orthopaedic Surgery  Foot and Ankle  [facility]      ORTHOPAEDIC FOOT AND ANKLE CLINIC VISIT     Assessment:     Encounter Diagnoses   Name Primary?    Other closed fracture of distal end of left fibula, initial encounter Yes    Encounter for lower extremity comparison imaging study             Plan:   The patient verbalized understanding of exam findings and treatment plan. We engaged in the shared decision-making process and treatment options were discussed at length with the patient. Surgical and conservative management discussed today along with risks and benefits.  Patient is 3.5 months s/p left distal fibula fracture, treating non operatively. DOI: 2/20/24, doing well. Patient refused x rays today.  Wearing a supportive sneaker without pain  Gradually return to normal activities as tolerated with modifications to avoid pain.   Rest, ice and elevation prn for pain control  Work note provided at patient's request  Return if symptoms worsen or fail to improve.      History of Present Illness:   Chief Complaint:   Chief Complaint   Patient presents with    Follow-up     Follow up of the left ankle. Wants a work note to go back to work.     Eric Yeager is a 63 y.o. male who is being seen in follow-up for left fibula fracture. When we last saw he we recommended WBAT in a sneaker.  Pain has improved. Residual pain is localized at lateral ankle with minimal radiating and described as mild and aching     Pain/symptom timing:  Worse during the day when active  Pain/symptom context:  Worse with activites and work  Pain/symptom modifying factors:  Rest makes better, activities make worse  Pain/symptom associated signs/symptoms: none    Prior treatment   NSAIDsYes   Injections No   Bracing/Orthotics Yes    Physical Therapy No     Orthopedic Surgical History:   See below    Past Medical, Surgical and Social History:  Past Medical History:  has a past medical  "history of Hepatitis C and Pancreatitis.  Problem List: does not have any pertinent problems on file.  Past Surgical History:  has a past surgical history that includes IR biopsy liver mass (8/31/2022).  Family History: family history is not on file.  Social History:  reports that he has been smoking. He has never used smokeless tobacco. He reports that he does not currently use alcohol after a past usage of about 1.0 standard drink of alcohol per week. He reports that he does not use drugs.  Current Medications: has a current medication list which includes the following prescription(s): golytely.  Allergies: has No Known Allergies.     Review of Systems:  General- denies fever/chills  HEENT- denies hearing loss or sore throat  Eyes- denies eye pain or visual disturbances, denies red eyes  Respiratory- denies cough or SOB  Cardio- denies chest pain or palpitations  GI- denies abdominal pain  Endocrine- denies urinary frequency  Urinary- denies pain with urination  Musculoskeletal- Negative except noted above  Skin- denies rashes or wounds  Neurological- denies dizziness or headache  Psychiatric- denies anxiety or difficulty concentrating    Physical Exam:   Ht 5' 7\" (1.702 m)   Wt 71.7 kg (158 lb)   BMI 24.75 kg/m²   General/Constitutional: No apparent distress: well-nourished and well developed.  Eyes: normal ocular motion  Lymphatic: No appreciable lymphadenopathy  Respiratory: Non-labored breathing  Vascular: No edema, swelling or tenderness, except as noted in detailed exam.  Integumentary: No impressive skin lesions present, except as noted in detailed exam.  Neuro: No ataxia or tremors noted  Psych: Normal mood and affect, oriented to person, place and time. Appropriate affect.  Musculoskeletal: Normal, except as noted in detailed exam and in HPI.    Examination    Left    Gait Normal   Musculoskeletal NTTP    Skin Normal.    Nails Normal    Range of Motion  20 degrees dorsiflexion, 30 degrees " plantarflexion  Subtalar motion: normal    Stability Stable    Muscle Strength 5/5 tibialis anterior  5/5 gastrocnemius-soleus  5/5 posterior tibialis  5/5 peroneal/eversion strength  5/5 EHL  5/5 FHL    Neurologic Normal    Sensation Intact to light touch throughout sural, saphenous, superficial peroneal, deep peroneal and medial/lateral plantar nerve distributions.  [place]-Edgar 5.07 filament (10g) testing deferred.    Cardiovascular Brisk capillary refill < 2 seconds,intact DP and PT pulses    Special Tests None      Imaging Studies:   Patient refused x rays today        James R. Lachman, MD  Foot & Ankle Surgery   Department of Orthopaedic Surgery  [facility]      I personally performed the service.    James R. Lachman, MD    Scribe Attestation      I,:  Joe Cee am acting as a scribe while in the presence of the attending physician.:       I,:  James R Lachman, MD personally performed the services described in this documentation    as scribed in my presence.:

## 2025-01-01 ENCOUNTER — APPOINTMENT (OUTPATIENT)
Dept: ULTRASOUND IMAGING | Facility: HOSPITAL | Age: 65
End: 2025-01-01

## 2025-01-01 ENCOUNTER — APPOINTMENT (INPATIENT)
Dept: CT IMAGING | Facility: HOSPITAL | Age: 65
End: 2025-01-01

## 2025-01-01 ENCOUNTER — APPOINTMENT (INPATIENT)
Dept: RADIOLOGY | Facility: HOSPITAL | Age: 65
End: 2025-01-01

## 2025-01-01 ENCOUNTER — APPOINTMENT (OUTPATIENT)
Dept: NON INVASIVE DIAGNOSTICS | Facility: HOSPITAL | Age: 65
End: 2025-01-01

## 2025-01-01 ENCOUNTER — APPOINTMENT (EMERGENCY)
Dept: RADIOLOGY | Facility: HOSPITAL | Age: 65
End: 2025-01-01

## 2025-01-01 ENCOUNTER — HOSPITAL ENCOUNTER (INPATIENT)
Facility: HOSPITAL | Age: 65
LOS: 1 days | End: 2025-03-25
Attending: EMERGENCY MEDICINE | Admitting: INTERNAL MEDICINE

## 2025-01-01 VITALS
OXYGEN SATURATION: 84 % | SYSTOLIC BLOOD PRESSURE: 45 MMHG | TEMPERATURE: 97.9 F | HEIGHT: 67 IN | BODY MASS INDEX: 28.41 KG/M2 | WEIGHT: 181 LBS | DIASTOLIC BLOOD PRESSURE: 18 MMHG

## 2025-01-01 DIAGNOSIS — R79.89 ELEVATED BRAIN NATRIURETIC PEPTIDE (BNP) LEVEL: ICD-10-CM

## 2025-01-01 DIAGNOSIS — B18.2 CHRONIC HEPATITIS C WITHOUT HEPATIC COMA (HCC): ICD-10-CM

## 2025-01-01 DIAGNOSIS — C22.0 HEPATOCELLULAR CARCINOMA (HCC): ICD-10-CM

## 2025-01-01 DIAGNOSIS — I48.91 ATRIAL FIBRILLATION WITH RVR (HCC): Primary | ICD-10-CM

## 2025-01-01 DIAGNOSIS — R79.89 ELEVATED TROPONIN: ICD-10-CM

## 2025-01-01 DIAGNOSIS — I50.9 CHF (CONGESTIVE HEART FAILURE) (HCC): ICD-10-CM

## 2025-01-01 LAB
2HR DELTA HS TROPONIN: 7 NG/L
4HR DELTA HS TROPONIN: -3 NG/L
AFP-TM SERPL-MCNC: 5.04 NG/ML (ref 0–9)
ALBUMIN SERPL BCG-MCNC: 3.6 G/DL (ref 3.5–5)
ALBUMIN SERPL BCG-MCNC: 3.7 G/DL (ref 3.5–5)
ALP SERPL-CCNC: 68 U/L (ref 34–104)
ALP SERPL-CCNC: 72 U/L (ref 34–104)
ALT SERPL W P-5'-P-CCNC: 106 U/L (ref 7–52)
ALT SERPL W P-5'-P-CCNC: 95 U/L (ref 7–52)
AMMONIA PLAS-SCNC: 51 UMOL/L (ref 18–72)
ANION GAP SERPL CALCULATED.3IONS-SCNC: 19 MMOL/L (ref 4–13)
ANION GAP SERPL CALCULATED.3IONS-SCNC: 5 MMOL/L (ref 4–13)
ANION GAP SERPL CALCULATED.3IONS-SCNC: 5 MMOL/L (ref 4–13)
AORTIC ROOT: 3 CM
AST SERPL W P-5'-P-CCNC: 111 U/L (ref 13–39)
AST SERPL W P-5'-P-CCNC: 128 U/L (ref 13–39)
ATRIAL RATE: 107 BPM
ATRIAL RATE: 166 BPM
ATRIAL RATE: 375 BPM
BASOPHILS # BLD AUTO: 0.05 THOUSANDS/ÂΜL (ref 0–0.1)
BASOPHILS # BLD AUTO: 0.06 THOUSANDS/ÂΜL (ref 0–0.1)
BASOPHILS NFR BLD AUTO: 1 % (ref 0–1)
BASOPHILS NFR BLD AUTO: 1 % (ref 0–1)
BILIRUB SERPL-MCNC: 1.3 MG/DL (ref 0.2–1)
BILIRUB SERPL-MCNC: 2.71 MG/DL (ref 0.2–1)
BNP SERPL-MCNC: 2201 PG/ML (ref 0–100)
BSA FOR ECHO PROCEDURE: 1.83 M2
BUN SERPL-MCNC: 23 MG/DL (ref 5–25)
BUN SERPL-MCNC: 24 MG/DL (ref 5–25)
BUN SERPL-MCNC: 28 MG/DL (ref 5–25)
CALCIUM SERPL-MCNC: 8.6 MG/DL (ref 8.4–10.2)
CALCIUM SERPL-MCNC: 8.8 MG/DL (ref 8.4–10.2)
CALCIUM SERPL-MCNC: 9.1 MG/DL (ref 8.4–10.2)
CARDIAC TROPONIN I PNL SERPL HS: 100 NG/L (ref ?–50)
CARDIAC TROPONIN I PNL SERPL HS: 103 NG/L (ref ?–50)
CARDIAC TROPONIN I PNL SERPL HS: 110 NG/L (ref ?–50)
CARDIAC TROPONIN I PNL SERPL HS: 91 NG/L (ref ?–50)
CHLORIDE SERPL-SCNC: 105 MMOL/L (ref 96–108)
CHLORIDE SERPL-SCNC: 108 MMOL/L (ref 96–108)
CHLORIDE SERPL-SCNC: 108 MMOL/L (ref 96–108)
CO2 SERPL-SCNC: 12 MMOL/L (ref 21–32)
CO2 SERPL-SCNC: 23 MMOL/L (ref 21–32)
CO2 SERPL-SCNC: 25 MMOL/L (ref 21–32)
CREAT SERPL-MCNC: 1.41 MG/DL (ref 0.6–1.3)
CREAT SERPL-MCNC: 1.54 MG/DL (ref 0.6–1.3)
CREAT SERPL-MCNC: 2.68 MG/DL (ref 0.6–1.3)
EOSINOPHIL # BLD AUTO: 0.01 THOUSAND/ÂΜL (ref 0–0.61)
EOSINOPHIL # BLD AUTO: 0.32 THOUSAND/ÂΜL (ref 0–0.61)
EOSINOPHIL NFR BLD AUTO: 0 % (ref 0–6)
EOSINOPHIL NFR BLD AUTO: 5 % (ref 0–6)
ERYTHROCYTE [DISTWIDTH] IN BLOOD BY AUTOMATED COUNT: 14.5 % (ref 11.6–15.1)
ERYTHROCYTE [DISTWIDTH] IN BLOOD BY AUTOMATED COUNT: 14.6 % (ref 11.6–15.1)
ERYTHROCYTE [DISTWIDTH] IN BLOOD BY AUTOMATED COUNT: 14.7 % (ref 11.6–15.1)
EST. AVERAGE GLUCOSE BLD GHB EST-MCNC: 128 MG/DL
FLUAV RNA RESP QL NAA+PROBE: NEGATIVE
FLUBV RNA RESP QL NAA+PROBE: NEGATIVE
FRACTIONAL SHORTENING: 10 (ref 28–44)
GFR SERPL CREATININE-BSD FRML MDRD: 24 ML/MIN/1.73SQ M
GFR SERPL CREATININE-BSD FRML MDRD: 47 ML/MIN/1.73SQ M
GFR SERPL CREATININE-BSD FRML MDRD: 52 ML/MIN/1.73SQ M
GLUCOSE SERPL-MCNC: 120 MG/DL (ref 65–140)
GLUCOSE SERPL-MCNC: 120 MG/DL (ref 65–140)
GLUCOSE SERPL-MCNC: 122 MG/DL (ref 65–140)
GLUCOSE SERPL-MCNC: 134 MG/DL (ref 65–140)
GLUCOSE SERPL-MCNC: 270 MG/DL (ref 65–140)
HBA1C MFR BLD: 6.1 %
HBV CORE AB SER QL: ABNORMAL
HBV CORE IGM SER QL: ABNORMAL
HBV SURFACE AG SER QL: REACTIVE
HBV SURFACE AG SERPL QL NT: NORMAL
HCT VFR BLD AUTO: 41.8 % (ref 36.5–49.3)
HCT VFR BLD AUTO: 46.3 % (ref 36.5–49.3)
HCT VFR BLD AUTO: 48.5 % (ref 36.5–49.3)
HCV AB SER QL: REACTIVE
HGB BLD-MCNC: 13.2 G/DL (ref 12–17)
HGB BLD-MCNC: 14.6 G/DL (ref 12–17)
HGB BLD-MCNC: 14.6 G/DL (ref 12–17)
IMM GRANULOCYTES # BLD AUTO: 0.02 THOUSAND/UL (ref 0–0.2)
IMM GRANULOCYTES # BLD AUTO: 0.15 THOUSAND/UL (ref 0–0.2)
IMM GRANULOCYTES NFR BLD AUTO: 0 % (ref 0–2)
IMM GRANULOCYTES NFR BLD AUTO: 2 % (ref 0–2)
INTERVENTRICULAR SEPTUM IN DIASTOLE (PARASTERNAL SHORT AXIS VIEW): 1.2 CM
INTERVENTRICULAR SEPTUM: 1.2 CM (ref 0.6–1.1)
LAAS-AP2: 29.5 CM2
LAAS-AP4: 27.4 CM2
LACTATE SERPL-SCNC: 8 MMOL/L (ref 0.5–2)
LEFT ATRIUM SIZE: 5.3 CM
LEFT ATRIUM VOLUME (MOD BIPLANE): 93 ML
LEFT ATRIUM VOLUME INDEX (MOD BIPLANE): 50.8 ML/M2
LEFT INTERNAL DIMENSION IN SYSTOLE: 4.3 CM (ref 2.1–4)
LEFT VENTRICLE DIASTOLIC VOLUME (MOD BIPLANE): 120 ML
LEFT VENTRICLE DIASTOLIC VOLUME INDEX (MOD BIPLANE): 65.6 ML/M2
LEFT VENTRICLE SYSTOLIC VOLUME (MOD BIPLANE): 90 ML
LEFT VENTRICLE SYSTOLIC VOLUME INDEX (MOD BIPLANE): 49.2 ML/M2
LEFT VENTRICULAR INTERNAL DIMENSION IN DIASTOLE: 4.8 CM (ref 3.5–6)
LEFT VENTRICULAR POSTERIOR WALL IN END DIASTOLE: 1.2 CM
LEFT VENTRICULAR STROKE VOLUME: 25 ML
LV EF BIPLANE MOD: 24 %
LV EF US.2D.A4C+ESTIMATED: 22 %
LVSV (TEICH): 25 ML
LYMPHOCYTES # BLD AUTO: 2.2 THOUSANDS/ÂΜL (ref 0.6–4.47)
LYMPHOCYTES # BLD AUTO: 2.43 THOUSANDS/ÂΜL (ref 0.6–4.47)
LYMPHOCYTES NFR BLD AUTO: 29 % (ref 14–44)
LYMPHOCYTES NFR BLD AUTO: 31 % (ref 14–44)
MAGNESIUM SERPL-MCNC: 1.9 MG/DL (ref 1.9–2.7)
MAGNESIUM SERPL-MCNC: 2.1 MG/DL (ref 1.9–2.7)
MCH RBC QN AUTO: 30.2 PG (ref 26.8–34.3)
MCH RBC QN AUTO: 30.4 PG (ref 26.8–34.3)
MCH RBC QN AUTO: 30.6 PG (ref 26.8–34.3)
MCHC RBC AUTO-ENTMCNC: 30.1 G/DL (ref 31.4–37.4)
MCHC RBC AUTO-ENTMCNC: 31.5 G/DL (ref 31.4–37.4)
MCHC RBC AUTO-ENTMCNC: 31.6 G/DL (ref 31.4–37.4)
MCV RBC AUTO: 100 FL (ref 82–98)
MCV RBC AUTO: 96 FL (ref 82–98)
MCV RBC AUTO: 97 FL (ref 82–98)
MONOCYTES # BLD AUTO: 0.69 THOUSAND/ÂΜL (ref 0.17–1.22)
MONOCYTES # BLD AUTO: 0.84 THOUSAND/ÂΜL (ref 0.17–1.22)
MONOCYTES NFR BLD AUTO: 10 % (ref 4–12)
MONOCYTES NFR BLD AUTO: 10 % (ref 4–12)
NEUTROPHILS # BLD AUTO: 3.86 THOUSANDS/ÂΜL (ref 1.85–7.62)
NEUTROPHILS # BLD AUTO: 5.03 THOUSANDS/ÂΜL (ref 1.85–7.62)
NEUTS SEG NFR BLD AUTO: 53 % (ref 43–75)
NEUTS SEG NFR BLD AUTO: 58 % (ref 43–75)
NRBC BLD AUTO-RTO: 0 /100 WBCS
NRBC BLD AUTO-RTO: 0 /100 WBCS
PLATELET # BLD AUTO: 150 THOUSANDS/UL (ref 149–390)
PLATELET # BLD AUTO: 166 THOUSANDS/UL (ref 149–390)
PLATELET # BLD AUTO: 184 THOUSANDS/UL (ref 149–390)
PMV BLD AUTO: 10.8 FL (ref 8.9–12.7)
PMV BLD AUTO: 11 FL (ref 8.9–12.7)
PMV BLD AUTO: 11.4 FL (ref 8.9–12.7)
POTASSIUM SERPL-SCNC: 4 MMOL/L (ref 3.5–5.3)
POTASSIUM SERPL-SCNC: 4.9 MMOL/L (ref 3.5–5.3)
POTASSIUM SERPL-SCNC: 5.4 MMOL/L (ref 3.5–5.3)
PROCALCITONIN SERPL-MCNC: 0.15 NG/ML
PROT SERPL-MCNC: 7.1 G/DL (ref 6.4–8.4)
PROT SERPL-MCNC: 7.1 G/DL (ref 6.4–8.4)
QRS AXIS: 103 DEGREES
QRS AXIS: 109 DEGREES
QRS AXIS: 109 DEGREES
QRSD INTERVAL: 78 MS
QRSD INTERVAL: 80 MS
QRSD INTERVAL: 82 MS
QT INTERVAL: 276 MS
QT INTERVAL: 316 MS
QT INTERVAL: 392 MS
QTC INTERVAL: 456 MS
QTC INTERVAL: 458 MS
QTC INTERVAL: 515 MS
RA PRESSURE ESTIMATED: 8 MMHG
RBC # BLD AUTO: 4.31 MILLION/UL (ref 3.88–5.62)
RBC # BLD AUTO: 4.81 MILLION/UL (ref 3.88–5.62)
RBC # BLD AUTO: 4.84 MILLION/UL (ref 3.88–5.62)
RIGHT ATRIUM AREA SYSTOLE A4C: 27 CM2
RIGHT VENTRICLE ID DIMENSION: 4.9 CM
RSV RNA RESP QL NAA+PROBE: NEGATIVE
RV PSP: 29 MMHG
SARS-COV-2 RNA RESP QL NAA+PROBE: NEGATIVE
SL CV LEFT ATRIUM LENGTH A2C: 7 CM
SL CV LV EF: 25
SL CV PED ECHO LEFT VENTRICLE DIASTOLIC VOLUME (MOD BIPLANE) 2D: 108 ML
SL CV PED ECHO LEFT VENTRICLE SYSTOLIC VOLUME (MOD BIPLANE) 2D: 83 ML
SODIUM SERPL-SCNC: 136 MMOL/L (ref 135–147)
SODIUM SERPL-SCNC: 136 MMOL/L (ref 135–147)
SODIUM SERPL-SCNC: 138 MMOL/L (ref 135–147)
T WAVE AXIS: -57 DEGREES
T WAVE AXIS: -63 DEGREES
T WAVE AXIS: -65 DEGREES
TR MAX PG: 21 MMHG
TR PEAK VELOCITY: 2.3 M/S
TRICUSPID ANNULAR PLANE SYSTOLIC EXCURSION: 1.3 CM
TRICUSPID VALVE PEAK REGURGITATION VELOCITY: 2.28 M/S
TSH SERPL DL<=0.05 MIU/L-ACNC: 3.03 UIU/ML (ref 0.45–4.5)
VENTRICULAR RATE: 104 BPM
VENTRICULAR RATE: 125 BPM
VENTRICULAR RATE: 166 BPM
WBC # BLD AUTO: 7.15 THOUSAND/UL (ref 4.31–10.16)
WBC # BLD AUTO: 7.39 THOUSAND/UL (ref 4.31–10.16)
WBC # BLD AUTO: 8.51 THOUSAND/UL (ref 4.31–10.16)

## 2025-01-01 PROCEDURE — 86803 HEPATITIS C AB TEST: CPT

## 2025-01-01 PROCEDURE — 96366 THER/PROPH/DIAG IV INF ADDON: CPT

## 2025-01-01 PROCEDURE — 87341 HEP B SURFACE AG NEUTRLZJ IA: CPT

## 2025-01-01 PROCEDURE — 84484 ASSAY OF TROPONIN QUANT: CPT

## 2025-01-01 PROCEDURE — NC001 PR NO CHARGE: Performed by: INTERNAL MEDICINE

## 2025-01-01 PROCEDURE — 80048 BASIC METABOLIC PNL TOTAL CA: CPT | Performed by: NURSE PRACTITIONER

## 2025-01-01 PROCEDURE — 93005 ELECTROCARDIOGRAM TRACING: CPT

## 2025-01-01 PROCEDURE — 84484 ASSAY OF TROPONIN QUANT: CPT | Performed by: EMERGENCY MEDICINE

## 2025-01-01 PROCEDURE — 96376 TX/PRO/DX INJ SAME DRUG ADON: CPT

## 2025-01-01 PROCEDURE — 76705 ECHO EXAM OF ABDOMEN: CPT

## 2025-01-01 PROCEDURE — 82105 ALPHA-FETOPROTEIN SERUM: CPT | Performed by: STUDENT IN AN ORGANIZED HEALTH CARE EDUCATION/TRAINING PROGRAM

## 2025-01-01 PROCEDURE — 36415 COLL VENOUS BLD VENIPUNCTURE: CPT | Performed by: NURSE PRACTITIONER

## 2025-01-01 PROCEDURE — 82140 ASSAY OF AMMONIA: CPT

## 2025-01-01 PROCEDURE — 99254 IP/OBS CNSLTJ NEW/EST MOD 60: CPT | Performed by: INTERNAL MEDICINE

## 2025-01-01 PROCEDURE — 0241U HB NFCT DS VIR RESP RNA 4 TRGT: CPT | Performed by: NURSE PRACTITIONER

## 2025-01-01 PROCEDURE — 86705 HEP B CORE ANTIBODY IGM: CPT

## 2025-01-01 PROCEDURE — 85027 COMPLETE CBC AUTOMATED: CPT | Performed by: NURSE PRACTITIONER

## 2025-01-01 PROCEDURE — 93010 ELECTROCARDIOGRAM REPORT: CPT | Performed by: INTERNAL MEDICINE

## 2025-01-01 PROCEDURE — 84443 ASSAY THYROID STIM HORMONE: CPT | Performed by: NURSE PRACTITIONER

## 2025-01-01 PROCEDURE — 74176 CT ABD & PELVIS W/O CONTRAST: CPT

## 2025-01-01 PROCEDURE — 82948 REAGENT STRIP/BLOOD GLUCOSE: CPT

## 2025-01-01 PROCEDURE — 85025 COMPLETE CBC W/AUTO DIFF WBC: CPT | Performed by: EMERGENCY MEDICINE

## 2025-01-01 PROCEDURE — 71250 CT THORAX DX C-: CPT

## 2025-01-01 PROCEDURE — 85025 COMPLETE CBC W/AUTO DIFF WBC: CPT

## 2025-01-01 PROCEDURE — 86704 HEP B CORE ANTIBODY TOTAL: CPT

## 2025-01-01 PROCEDURE — 83605 ASSAY OF LACTIC ACID: CPT

## 2025-01-01 PROCEDURE — 99285 EMERGENCY DEPT VISIT HI MDM: CPT

## 2025-01-01 PROCEDURE — 80053 COMPREHEN METABOLIC PANEL: CPT | Performed by: EMERGENCY MEDICINE

## 2025-01-01 PROCEDURE — 71045 X-RAY EXAM CHEST 1 VIEW: CPT

## 2025-01-01 PROCEDURE — 83880 ASSAY OF NATRIURETIC PEPTIDE: CPT | Performed by: EMERGENCY MEDICINE

## 2025-01-01 PROCEDURE — 99233 SBSQ HOSP IP/OBS HIGH 50: CPT | Performed by: INTERNAL MEDICINE

## 2025-01-01 PROCEDURE — 84145 PROCALCITONIN (PCT): CPT

## 2025-01-01 PROCEDURE — 83735 ASSAY OF MAGNESIUM: CPT | Performed by: NURSE PRACTITIONER

## 2025-01-01 PROCEDURE — 83036 HEMOGLOBIN GLYCOSYLATED A1C: CPT

## 2025-01-01 PROCEDURE — 99222 1ST HOSP IP/OBS MODERATE 55: CPT | Performed by: NURSE PRACTITIONER

## 2025-01-01 PROCEDURE — 93306 TTE W/DOPPLER COMPLETE: CPT

## 2025-01-01 PROCEDURE — 96374 THER/PROPH/DIAG INJ IV PUSH: CPT

## 2025-01-01 PROCEDURE — 87340 HEPATITIS B SURFACE AG IA: CPT

## 2025-01-01 PROCEDURE — 93306 TTE W/DOPPLER COMPLETE: CPT | Performed by: INTERNAL MEDICINE

## 2025-01-01 PROCEDURE — 96365 THER/PROPH/DIAG IV INF INIT: CPT

## 2025-01-01 PROCEDURE — 83735 ASSAY OF MAGNESIUM: CPT | Performed by: EMERGENCY MEDICINE

## 2025-01-01 PROCEDURE — 99291 CRITICAL CARE FIRST HOUR: CPT | Performed by: EMERGENCY MEDICINE

## 2025-01-01 PROCEDURE — 80053 COMPREHEN METABOLIC PANEL: CPT

## 2025-01-01 PROCEDURE — 36415 COLL VENOUS BLD VENIPUNCTURE: CPT | Performed by: EMERGENCY MEDICINE

## 2025-01-01 PROCEDURE — 96368 THER/DIAG CONCURRENT INF: CPT

## 2025-01-01 RX ORDER — CALCIUM GLUCONATE 20 MG/ML
1 INJECTION, SOLUTION INTRAVENOUS ONCE
Status: COMPLETED | OUTPATIENT
Start: 2025-01-01 | End: 2025-01-01

## 2025-01-01 RX ORDER — HEPARIN SODIUM 5000 [USP'U]/ML
5000 INJECTION, SOLUTION INTRAVENOUS; SUBCUTANEOUS EVERY 8 HOURS SCHEDULED
Status: DISCONTINUED | OUTPATIENT
Start: 2025-01-01 | End: 2025-01-01

## 2025-01-01 RX ORDER — BENZONATATE 100 MG/1
100 CAPSULE ORAL 3 TIMES DAILY PRN
Status: DISCONTINUED | OUTPATIENT
Start: 2025-01-01 | End: 2025-01-01 | Stop reason: HOSPADM

## 2025-01-01 RX ORDER — NICOTINE 21 MG/24HR
1 PATCH, TRANSDERMAL 24 HOURS TRANSDERMAL DAILY
Status: DISCONTINUED | OUTPATIENT
Start: 2025-01-01 | End: 2025-01-01 | Stop reason: HOSPADM

## 2025-01-01 RX ORDER — LANOLIN ALCOHOL/MO/W.PET/CERES
100 CREAM (GRAM) TOPICAL DAILY
Status: DISCONTINUED | OUTPATIENT
Start: 2025-01-01 | End: 2025-01-01 | Stop reason: HOSPADM

## 2025-01-01 RX ORDER — MAGNESIUM SULFATE HEPTAHYDRATE 40 MG/ML
2 INJECTION, SOLUTION INTRAVENOUS ONCE
Status: CANCELLED | OUTPATIENT
Start: 2025-01-01 | End: 2025-01-01

## 2025-01-01 RX ORDER — METOPROLOL TARTRATE 25 MG/1
25 TABLET, FILM COATED ORAL 2 TIMES DAILY
Status: DISCONTINUED | OUTPATIENT
Start: 2025-01-01 | End: 2025-01-01

## 2025-01-01 RX ORDER — FUROSEMIDE 10 MG/ML
40 INJECTION INTRAMUSCULAR; INTRAVENOUS ONCE
Status: DISCONTINUED | OUTPATIENT
Start: 2025-01-01 | End: 2025-01-01 | Stop reason: HOSPADM

## 2025-01-01 RX ORDER — METOPROLOL TARTRATE 50 MG
50 TABLET ORAL EVERY 6 HOURS SCHEDULED
Status: DISCONTINUED | OUTPATIENT
Start: 2025-01-01 | End: 2025-01-01 | Stop reason: HOSPADM

## 2025-01-01 RX ORDER — SODIUM CHLORIDE, SODIUM GLUCONATE, SODIUM ACETATE, POTASSIUM CHLORIDE, MAGNESIUM CHLORIDE, SODIUM PHOSPHATE, DIBASIC, AND POTASSIUM PHOSPHATE .53; .5; .37; .037; .03; .012; .00082 G/100ML; G/100ML; G/100ML; G/100ML; G/100ML; G/100ML; G/100ML
75 INJECTION, SOLUTION INTRAVENOUS CONTINUOUS
Status: DISCONTINUED | OUTPATIENT
Start: 2025-01-01 | End: 2025-01-01

## 2025-01-01 RX ORDER — ACETAMINOPHEN 325 MG/1
650 TABLET ORAL EVERY 6 HOURS PRN
Status: DISCONTINUED | OUTPATIENT
Start: 2025-01-01 | End: 2025-01-01 | Stop reason: HOSPADM

## 2025-01-01 RX ORDER — FOLIC ACID 1 MG/1
1 TABLET ORAL DAILY
Status: DISCONTINUED | OUTPATIENT
Start: 2025-01-01 | End: 2025-01-01 | Stop reason: HOSPADM

## 2025-01-01 RX ORDER — DILTIAZEM HYDROCHLORIDE 5 MG/ML
20 INJECTION INTRAVENOUS ONCE
Status: COMPLETED | OUTPATIENT
Start: 2025-01-01 | End: 2025-01-01

## 2025-01-01 RX ORDER — ATROPINE SULFATE 1 MG/ML
1 INJECTION, SOLUTION INTRAVENOUS ONCE
Status: DISCONTINUED | OUTPATIENT
Start: 2025-01-01 | End: 2025-01-01 | Stop reason: HOSPADM

## 2025-01-01 RX ORDER — MAGNESIUM SULFATE HEPTAHYDRATE 40 MG/ML
2 INJECTION, SOLUTION INTRAVENOUS ONCE
Status: COMPLETED | OUTPATIENT
Start: 2025-01-01 | End: 2025-01-01

## 2025-01-01 RX ORDER — FUROSEMIDE 10 MG/ML
40 INJECTION INTRAMUSCULAR; INTRAVENOUS ONCE
Status: COMPLETED | OUTPATIENT
Start: 2025-01-01 | End: 2025-01-01

## 2025-01-01 RX ADMIN — DILTIAZEM HYDROCHLORIDE 5 MG/HR: 5 INJECTION, SOLUTION INTRAVENOUS at 18:01

## 2025-01-01 RX ADMIN — DILTIAZEM HYDROCHLORIDE 20 MG: 5 INJECTION, SOLUTION INTRAVENOUS at 17:52

## 2025-01-01 RX ADMIN — DILTIAZEM HYDROCHLORIDE 12.5 MG/HR: 5 INJECTION, SOLUTION INTRAVENOUS at 11:11

## 2025-01-01 RX ADMIN — MAGNESIUM SULFATE HEPTAHYDRATE 2 G: 40 INJECTION, SOLUTION INTRAVENOUS at 18:40

## 2025-01-01 RX ADMIN — HEPARIN SODIUM 5000 UNITS: 5000 INJECTION INTRAVENOUS; SUBCUTANEOUS at 05:35

## 2025-01-01 RX ADMIN — DILTIAZEM HYDROCHLORIDE 20 MG: 5 INJECTION, SOLUTION INTRAVENOUS at 19:16

## 2025-01-01 RX ADMIN — SODIUM CHLORIDE, SODIUM GLUCONATE, SODIUM ACETATE, POTASSIUM CHLORIDE, MAGNESIUM CHLORIDE, SODIUM PHOSPHATE, DIBASIC, AND POTASSIUM PHOSPHATE 75 ML/HR: .53; .5; .37; .037; .03; .012; .00082 INJECTION, SOLUTION INTRAVENOUS at 08:08

## 2025-01-01 RX ADMIN — METOPROLOL TARTRATE 50 MG: 50 TABLET, FILM COATED ORAL at 17:51

## 2025-01-01 RX ADMIN — FUROSEMIDE 40 MG: 10 INJECTION, SOLUTION INTRAMUSCULAR; INTRAVENOUS at 21:31

## 2025-01-01 RX ADMIN — DILTIAZEM HYDROCHLORIDE 15 MG/HR: 5 INJECTION, SOLUTION INTRAVENOUS at 03:14

## 2025-01-01 RX ADMIN — APIXABAN 5 MG: 5 TABLET, FILM COATED ORAL at 21:42

## 2025-01-01 RX ADMIN — CALCIUM GLUCONATE 1 G: 20 INJECTION, SOLUTION INTRAVENOUS at 04:17

## 2025-01-01 RX ADMIN — PERFLUTREN 0.6 ML/MIN: 6.52 INJECTION, SUSPENSION INTRAVENOUS at 15:58

## 2025-01-01 RX ADMIN — THIAMINE HYDROCHLORIDE: 100 INJECTION, SOLUTION INTRAMUSCULAR; INTRAVENOUS at 18:13

## 2025-01-01 RX ADMIN — MULTIPLE VITAMINS W/ MINERALS TAB 1 TABLET: TAB ORAL at 08:07

## 2025-01-01 RX ADMIN — FUROSEMIDE 40 MG: 10 INJECTION, SOLUTION INTRAMUSCULAR; INTRAVENOUS at 09:58

## 2025-01-01 RX ADMIN — HEPARIN SODIUM 5000 UNITS: 5000 INJECTION INTRAVENOUS; SUBCUTANEOUS at 21:31

## 2025-01-01 RX ADMIN — APIXABAN 5 MG: 5 TABLET, FILM COATED ORAL at 12:56

## 2025-01-01 RX ADMIN — Medication 100 MG: at 08:07

## 2025-01-01 RX ADMIN — METOPROLOL TARTRATE 50 MG: 50 TABLET, FILM COATED ORAL at 00:23

## 2025-01-01 RX ADMIN — METOPROLOL TARTRATE 25 MG: 25 TABLET, FILM COATED ORAL at 08:06

## 2025-01-01 RX ADMIN — FOLIC ACID 1 MG: 1 TABLET ORAL at 08:07

## 2025-01-01 RX ADMIN — Medication 6 MG: at 21:42

## 2025-01-01 RX ADMIN — NICOTINE 1 PATCH: 14 PATCH, EXTENDED RELEASE TRANSDERMAL at 08:07

## 2025-03-23 PROBLEM — N17.9 AKI (ACUTE KIDNEY INJURY) (HCC): Status: ACTIVE | Noted: 2025-01-01

## 2025-03-23 PROBLEM — R13.10 DIFFICULTY SWALLOWING: Status: ACTIVE | Noted: 2025-01-01

## 2025-03-23 PROBLEM — I48.91 ATRIAL FIBRILLATION WITH RVR (HCC): Status: ACTIVE | Noted: 2025-01-01

## 2025-03-23 NOTE — ED ATTENDING ATTESTATION
3/23/2025  I, Yamil Ayala DO, saw and evaluated the patient. I have discussed the patient with the resident/non-physician practitioner and agree with the resident's/non-physician practitioner's findings, Plan of Care, and MDM as documented in the resident's/non-physician practitioner's note, except where noted. All available labs and Radiology studies were reviewed.  I was present for key portions of any procedure(s) performed by the resident/non-physician practitioner and I was immediately available to provide assistance.       At this point I agree with the current assessment done in the Emergency Department.  I have conducted an independent evaluation of this patient a history and physical is as follows:    Patient is a 64-year-old male with a history of alcohol use, hepatitis C and hepatocellular carcinoma who presents with shortness of breath.  Patient states that he has had a worsening cough and shortness of breath over the course of about 2 weeks.  He describes worsening dyspnea on exertion and orthopnea.  He has not noticed any swelling in his legs.  He states the cough is largely nonproductive.  He admits to lightheadedness, particularly with activity.  He denies chest pain, palpitations.  He denies a history of atrial fibrillation.    On exam, patient is in no acute distress.  Heart is tachycardic, irregularly irregular.  Breath sounds normal.  Mild pitting edema in bilateral lower extremities.  No calf tenderness.    Patient given Cardizem bolus and started on infusion for atrial fibrillation with rapid ventricular response.   Patient's troponin is mildly elevated, likely secondary to tachycardia, heart failure.  BNP is consistent with congestive heart failure.  Will continue to monitor blood pressure given calcium channel blocker.  Will hospitalize for further workup and treatment.    Critical Care Time Statement: Upon my evaluation, this patient had a high probability of imminent or  "life-threatening deterioration due to atrial fibrillation with RVR, which required my direct attention, intervention, and personal management.  I spent a total of 30 minutes directly providing critical care services, including interpretation of complex medical databases, evaluating for the presence of life-threatening injuries or illnesses, management of organ system failure(s) , complex medical decision making (to support/prevent further life-threatening deterioration)., interpretation of hemodynamic data, and titration of continuous IV medications (drips). This time is exclusive of procedures, teaching, treating other patients, family meetings, and any prior time recorded by providers other than myself.       Portions of the above record have been created with voice recognition software.  Occasional wrong word or \"sound alike\" substitutions may have occurred due to the inherent limitations of voice recognition software.  Read the chart carefully and recognize, using context, where substitutions may have occurred.      ED Course         Critical Care Time  Procedures      "

## 2025-03-23 NOTE — ED PROVIDER NOTES
Time reflects when diagnosis was documented in both MDM as applicable and the Disposition within this note       Time User Action Codes Description Comment    3/23/2025  8:32 PM Naheed Tovar Add [I48.91] Atrial fibrillation with RVR (HCC)     3/23/2025  8:37 PM de OceconstantinoAndrew M Modify [I48.91] Atrial fibrillation with RVR (HCC)     3/23/2025  8:37 PM de Ocejo, Andrew M Add [R79.89] Elevated troponin     3/23/2025  8:38 PM de Ocejo, Andrew M Add [R79.89] Elevated brain natriuretic peptide (BNP) level     3/23/2025  8:38 PM de Ocejo, Andrew M Add [I50.9] CHF (congestive heart failure) (HCC)           ED Disposition       ED Disposition   Admit    Condition   Stable    Date/Time   Sun Mar 23, 2025  8:37 PM    Comment   Case was discussed with Naheed Tovar and the patient's admission status was agreed to be Admission Status: observation status to the service of Dr. Browne.               Assessment & Plan       Medical Decision Making  64-year-old male with past medical history of hepatocellular carcinoma as well as alcohol use disorder presents to the emergency department with concern for shortness of breath.  Patient found to be in atrial fibrillation with rapid ventricular response, concern for possible CHF secondary due to atrial fibrillation.  Laboratory analysis showing elevated troponin, likely secondary due to demand, BNP elevation.  Worsening creatinine from previous 2 years ago, no other significant acute pertinent findings.  Patient provided with Cardizem as well as magnesium with improvement of heart rate.  Thiamine as well as folate administered.  Due to patient's constellation of symptoms discussed with internal medicine service who accepted the patient for further evaluation and management.    Amount and/or Complexity of Data Reviewed  Labs: ordered. Decision-making details documented in ED Course.  Radiology: ordered and independent interpretation performed.    Risk  Prescription drug  management.  Decision regarding hospitalization.        ED Course as of 03/23/25 2336   Sun Mar 23, 2025   1805 CBC and differential  Reassuring, within normal limits     1830 Creatinine(!): 1.41  Increased from 2 years ago   1831 AST(!): 111  Improved from 2 years ago   1831 ALT(!): 95  Improved from 2 years ago   1900 hs TnI 0hr(!): 103  Likely demand   1900 BNP(!): 2,201       Medications   diltiazem (CARDIZEM) 125 mg in sodium chloride 0.9 % 125 mL infusion (15 mg/hr Intravenous Rate/Dose Change 3/23/25 2029)   nicotine (NICODERM CQ) 14 mg/24hr TD 24 hr patch 1 patch (has no administration in time range)   metoprolol tartrate (LOPRESSOR) tablet 25 mg (has no administration in time range)   thiamine tablet 100 mg (has no administration in time range)   folic acid (FOLVITE) tablet 1 mg (has no administration in time range)   multivitamin-minerals (CENTRUM) tablet 1 tablet (has no administration in time range)   benzonatate (TESSALON PERLES) capsule 100 mg (has no administration in time range)   acetaminophen (TYLENOL) tablet 650 mg (has no administration in time range)   melatonin tablet 3 mg (has no administration in time range)   heparin (porcine) subcutaneous injection 5,000 Units (5,000 Units Subcutaneous Given 3/23/25 2131)   diltiazem (CARDIZEM) injection 20 mg (20 mg Intravenous Given 3/23/25 1752)   magnesium sulfate 2 g/50 mL IVPB (premix) 2 g (0 g Intravenous Stopped 3/23/25 2040)   diltiazem (CARDIZEM) injection 20 mg (20 mg Intravenous Given 3/23/25 1916)   furosemide (LASIX) injection 40 mg (40 mg Intravenous Given 3/23/25 2131)       ED Risk Strat Scores                 CIWA-Ar Score       Row Name 03/23/25 2200             CIWA-Ar    Blood Pressure 131/93      Pulse 144      Nausea and Vomiting 0      Tactile Disturbances 0      Tremor 1      Auditory Disturbances 0      Paroxysmal Sweats 0      Visual Disturbances 0      Anxiety 1      Headache, Fullness in Head 0      Agitation 0      Orientation  and Clouding of Sensorium 0      CIWA-Ar Total 2                                                  History of Present Illness       Chief Complaint   Patient presents with    Cough     Cough, SOS, THOMPSON, ABD pain vomiting after eating x 3 weeks       Past Medical History:   Diagnosis Date    Hepatitis C     Pancreatitis       Past Surgical History:   Procedure Laterality Date    IR BIOPSY LIVER MASS  2022      No family history on file.   Social History     Tobacco Use    Smoking status: Every Day     Current packs/day: 0.50     Types: Cigarettes    Smokeless tobacco: Never   Vaping Use    Vaping status: Never Used   Substance Use Topics    Alcohol use: Not Currently     Alcohol/week: 1.0 standard drink of alcohol     Types: 1 Cans of beer per week     Comment: one 12 oz. can yesterday 22, down from a case a day    Drug use: Never      E-Cigarette/Vaping    E-Cigarette Use Never User       E-Cigarette/Vaping Substances    Nicotine No     THC No     CBD No     Flavoring No     Other No     Unknown No       I have reviewed and agree with the history as documented.     (Eric Yeager) Eric Yeager is a 64 y.o. male     They presented to the emergency department on 2025. Patient presents with:  Cough: Cough, SOB, THOMPSON, vomiting after eating x 3 weeks.    The patient states that he has a history of hepatocellular carcinoma, as well as alcohol use disorder, notes that 3 weeks ago he began experiencing palpitations, shortness of breath, and worsening dyspnea on exertion.  Patient states that he stopped drinking at that time due to his symptoms and has not had any alcohol since.  Patient notes that his family member passed away, and was unable to go to the  due to his symptoms which prompted his brother to bring him to the emergency department. Patient denies known fever, chills, back pain, or any other complaint at this time.              Review of Systems   Constitutional:  Negative for chills and fever.    HENT:  Negative for ear pain and sore throat.    Eyes:  Negative for pain and visual disturbance.   Respiratory:  Positive for shortness of breath. Negative for cough.    Cardiovascular:  Positive for palpitations and leg swelling. Negative for chest pain.   Gastrointestinal:  Negative for abdominal pain and vomiting.   Genitourinary:  Negative for dysuria and hematuria.   Musculoskeletal:  Negative for arthralgias and back pain.   Skin:  Negative for color change and rash.   Neurological:  Negative for seizures and syncope.   All other systems reviewed and are negative.          Objective       ED Triage Vitals   Temperature Pulse Blood Pressure Respirations SpO2 Patient Position - Orthostatic VS   03/23/25 1735 03/23/25 1735 03/23/25 1735 03/23/25 1735 03/23/25 1735 --   97.9 °F (36.6 °C) (!) 145 156/98 (!) 24 98 %       Temp Source Heart Rate Source BP Location FiO2 (%) Pain Score    03/23/25 1735 03/23/25 1735 03/23/25 1735 -- 03/23/25 2138    Oral Monitor Right arm  No Pain      Vitals      Date and Time Temp Pulse SpO2 Resp BP Pain Score FACES Pain Rating User   03/23/25 2230 -- 113 -- -- 131/78 -- -- ND   03/23/25 2200 -- 144 -- 22 131/93 -- -- ND   03/23/25 2138 -- -- -- -- -- No Pain -- ND   03/23/25 2100 -- 134 -- -- 140/95 -- -- ND   03/23/25 2029 -- 130 -- -- 115/86 -- -- ND   03/23/25 2025 -- 118 -- -- -- -- -- TMD   03/23/25 2006 -- 133 -- -- 115/89 -- -- ND   03/23/25 2000 -- -- 93 % -- -- -- -- ND   03/23/25 1900 -- 136 95 % 22 123/86 -- -- ND   03/23/25 1845 -- 147 95 % 22 117/98 -- -- Z   03/23/25 1838 -- 144 -- -- 117/98 -- -- LD   03/23/25 1830 -- 148 95 % 20 115/86 -- -- LD   03/23/25 1816 -- 151 -- -- 119/88 -- -- MD   03/23/25 1815 -- 144 94 % 20 132/91 -- -- MD   03/23/25 1801 -- 153 -- -- -- -- -- LD   03/23/25 1735 97.9 °F (36.6 °C) 145 98 % 24 156/98 -- -- AW            Physical Exam  Vitals and nursing note reviewed.   Constitutional:       General: He is in acute distress (Moderate).       Appearance: Normal appearance.   HENT:      Head: Normocephalic and atraumatic.      Right Ear: External ear normal.      Left Ear: External ear normal.      Nose: Nose normal.      Mouth/Throat:      Mouth: Mucous membranes are moist.   Eyes:      Conjunctiva/sclera: Conjunctivae normal.   Cardiovascular:      Rate and Rhythm: Tachycardia present. Rhythm irregular.   Pulmonary:      Effort: Pulmonary effort is normal. No respiratory distress.      Breath sounds: Normal breath sounds.   Abdominal:      General: Abdomen is flat. Bowel sounds are normal.      Tenderness: There is no abdominal tenderness. There is no guarding or rebound.   Musculoskeletal:         General: Normal range of motion.      Cervical back: Normal range of motion.      Right lower leg: Edema (+1) present.      Left lower leg: Edema (+1) present.   Skin:     General: Skin is warm and dry.      Capillary Refill: Capillary refill takes less than 2 seconds.   Neurological:      Mental Status: He is alert. Mental status is at baseline.   Psychiatric:         Mood and Affect: Mood normal.         Results Reviewed       Procedure Component Value Units Date/Time    COVID/FLU/RSV [[id number]]  (Normal) Collected: 03/23/25 2132    Lab Status: Final result Specimen: Nares from Nose Updated: 03/23/25 2230     SARS-CoV-2 Negative     INFLUENZA A PCR Negative     INFLUENZA B PCR Negative     RSV PCR Negative    Narrative:      This test has been performed using the CoV-2/Flu/RSV plus assay on the [employer] GeneXpert platform. This test has been validated by the  and verified by the performing laboratory.     This test is designed to amplify and detect the following: nucleocapsid (N), envelope (E), and RNA-dependent RNA polymerase (RdRP) genes of the SARS-CoV-2 genome; matrix (M), basic polymerase (PB2), and acidic protein (PA) segments of the influenza A genome; matrix (M) and non-structural protein (NS) segments of the influenza B genome, and  the nucleocapsid genes of RSV A and RSV B.     Positive results are indicative of the presence of Flu A, Flu B, RSV, and/or SARS-CoV-2 RNA. Positive results for SARS-CoV-2 or suspected novel influenza should be reported to state, local, or federal health departments according to local reporting requirements.      All results should be assessed in conjunction with clinical presentation and other laboratory markers for clinical management.     FOR PEDIATRIC PATIENTS - copy/paste COVID Guidelines URL to browser: [email or web address]       HS Troponin I 4hr [[id number]]  (Abnormal) Collected: 03/23/25 2145    Lab Status: Final result Specimen: Blood from Hand, Left Updated: 03/23/25 2218     hs TnI 4hr 100 ng/L      Delta 4hr hsTnI -3 ng/L     TSH, 3rd generation with Free T4 reflex [[id number]]  (Normal) Collected: 03/23/25 2006    Lab Status: Final result Specimen: Blood from Arm, Left Updated: 03/23/25 2121     TSH 3RD GENERATON 3.027 uIU/mL     HS Troponin I 2hr [[id number]]  (Abnormal) Collected: 03/23/25 2006    Lab Status: Final result Specimen: Blood from Arm, Left Updated: 03/23/25 2037     hs TnI 2hr 110 ng/L      Delta 2hr hsTnI 7 ng/L     B-Type Natriuretic Peptide(BNP) [[id number]]  (Abnormal) Collected: 03/23/25 1752    Lab Status: Final result Specimen: Blood from Arm, Left Updated: 03/23/25 1851     BNP 2,201 pg/mL     HS Troponin 0hr (reflex protocol) [[id number]]  (Abnormal) Collected: 03/23/25 1756    Lab Status: Final result Specimen: Blood from Arm, Left Updated: 03/23/25 1848     hs TnI 0hr 103 ng/L     Comprehensive metabolic panel [[id number]]  (Abnormal) Collected: 03/23/25 1752    Lab Status: Final result Specimen: Blood from Arm, Left Updated: 03/23/25 1818     Sodium 136 mmol/L      Potassium 4.9 mmol/L      Chloride 108 mmol/L      CO2 23 mmol/L      ANION GAP 5 mmol/L      BUN 23 mg/dL      Creatinine 1.41 mg/dL      Glucose 120 mg/dL       Calcium 8.8 mg/dL       U/L      ALT 95 U/L      Alkaline Phosphatase 72 U/L      Total Protein 7.1 g/dL      Albumin 3.7 g/dL      Total Bilirubin 1.30 mg/dL      eGFR 52 ml/min/1.73sq m     Narrative:      National Kid[employer] guidelines for Chronic Kidney Disease (CKD):     Stage 1 with normal or high GFR (GFR > 90 mL/min/1.73 square meters)    Stage 2 Mild CKD (GFR = 60-89 mL/min/1.73 square meters)    Stage 3A Moderate CKD (GFR = 45-59 mL/min/1.73 square meters)    Stage 3B Moderate CKD (GFR = 30-44 mL/min/1.73 square meters)    Stage 4 Severe CKD (GFR = 15-29 mL/min/1.73 square meters)    Stage 5 End Stage CKD (GFR <15 mL/min/1.73 square meters)  Note: GFR calculation is accurate only with a steady state creatinine    Magnesium [[id number]]  (Normal) Collected: 03/23/25 1752    Lab Status: Final result Specimen: Blood from Arm, Left Updated: 03/23/25 1818     Magnesium 1.9 mg/dL     CBC and differential [[id number]] Collected: 03/23/25 1752    Lab Status: Final result Specimen: Blood from Arm, Left Updated: 03/23/25 1803     WBC 7.15 Thousand/uL      RBC 4.81 Million/uL      Hemoglobin 14.6 g/dL      Hematocrit 46.3 %      MCV 96 fL      MCH 30.4 pg      MCHC 31.5 g/dL      RDW 14.6 %      MPV 10.8 fL      Platelets 166 Thousands/uL      nRBC 0 /100 WBCs      Segmented % 53 %      Immature Grans % 0 %      Lymphocytes % 31 %      Monocytes % 10 %      Eosinophils Relative 5 %      Basophils Relative 1 %      Absolute Neutrophils 3.86 Thousands/µL      Absolute Immature Grans 0.02 Thousand/uL      Absolute Lymphocytes 2.20 Thousands/µL      Absolute Monocytes 0.69 Thousand/µL      Eosinophils Absolute 0.32 Thousand/µL      Basophils Absolute 0.06 Thousands/µL             XR chest portable   ED Interpretation by Andrew Rehman MD (03/23 1759)   Mild increased pulmonary congestion.  Independently interpreted by myself.          ECG 12 Lead Documentation Only    Date/Time: 3/23/2025 11:27  PM    Performed by: Andrew Rehman MD  Authorized by: Andrew Rehman MD    ECG reviewed by me, the ED Provider: yes    Patient location:  ED  Previous ECG:     Previous ECG:  Compared to current    Comparison ECG info:  Afib rvr    Similarity:  Changes noted  Interpretation:     Interpretation: abnormal    Rate:     ECG rate:  166    ECG rate assessment: tachycardic    Rhythm:     Rhythm: atrial fibrillation    Ectopy:     Ectopy: none    QRS:     QRS axis:  Normal    QRS intervals:  Normal  Conduction:     Conduction: normal    ST segments:     ST segments:  Normal  T waves:     T waves: normal    Comments:      Atrial fibrillation with rapid ventricular response at 166 bpm, no PAC, no PVC, no acute ischemic changes.      ED Medication and Procedure Management   Prior to Admission Medications   Prescriptions Last Dose Informant Patient Reported? Taking?   polyethylene glycol (Golytely) 4000 mL solution   No No   Sig: Take 4,000 mL by mouth once for 1 dose Take 4000 mL by mouth once for 1 dose. Use as directed      Facility-Administered Medications: None     Patient's Medications   Discharge Prescriptions    No medications on file     No discharge procedures on file.  ED SEPSIS DOCUMENTATION   Time reflects when diagnosis was documented in both MDM as applicable and the Disposition within this note       Time User Action Codes Description Comment    3/23/2025  8:32 PM Naheed Tovar Add [I48.91] Atrial fibrillation with RVR ([facility])     3/23/2025  8:37 PM Andrew Rehman Modify [I48.91] Atrial fibrillation with RVR (HCC)     3/23/2025  8:37 PM Andrew Rehman Add [R79.89] Elevated troponin     3/23/2025  8:38 PM Andrew Rehman Add [R79.89] Elevated brain natriuretic peptide (BNP) level     3/23/2025  8:38 PM Andrew Rehman Add [I50.9] CHF (congestive heart failure) ([facility])                  Andrew Rehman MD  03/23/25 7304

## 2025-03-24 NOTE — CONSULTS
Cardiology   Eric Yeager 64 y.o. male MRN: [id number]  Unit/Bed#: ED-09 Encounter: [id number]      Reason for Consult / Principal Problem: Afib    Physician Requesting Consult:  Desire Berman MD    Outpatient Cardiologist:     Assessment  1.  Newly diagnosed atrial fibrillation with RVR POA.  ECG on admission demonstrated atrial fibrillation with RVR, rate 166 bpm  Telemetry review- A-fib, heart rates currently in the 70s to 90s.  Outpatient rate/rhythm control; none  Inpatient rate/rhythm control; metoprolol tartrate 25 mg twice daily  Not currently on systemic AC. NSAOy7ihoc score = 2 (HF, vasc disease)  Electrolytes stable on BMP.  TSH level 3.02  2. Nonischemic myocardial injury in the context of tachyarrhythmia/CHF.  No complaints of CP.  HS troponin levels flat 103-/110-100.  3. Acute CHF exacerbation, unclear if diastolic versus systolic.  BNP level 2201  Chest x-ray 3/23/2025; mild pulmonary edema small bilateral pleural effusions.  TTE pending.  Outpatient diuretic regimen; none  Inpatient diuretic regimen; s/p 1 dose IV Lasix 40 mg x 1 on 3/23.  24-hour I&O balance; -230 mL question accuracy  4. Hepatocellular carcinoma  5. Chronic hep C, untreated.  6. Alcohol abuse  Drinking up to a case of beer daily for many years.  More recently cut down to a sixpack daily, but quit 2 weeks ago.  7. Nicotine dependence  Longstanding cigarette smoker.  Quit 2 weeks ago  8.  Elevated creatinine  Creatinine level back in 2022; 0.8-1.0  No recent BMP PTA.  Creatinine 1.41 on admission, currently 1.5.    Plan  Pt denies chest pain or palpitations.  Still feeling short of breath with exertion.  No current supplemental O2 requirements.  Mild volume overload present on exam.  He reports a robust urinary response with 1 dose of IV Lasix 40 mg yesterday evening.  Give 1 more dose of IV Lasix 40 mg this a.m.  Obtain TTE.  Further recs pending results.  Increase metoprolol to tartrate to 50 mg q6h; wean down IV Cardizem HR  goal <110.  Would recommend rhythm control with JOSEPH guided CV procedure in attempt for restoration of NSR once optimized from a volume perspective.  Start apixaban 5 mg twice daily for systemic AC.  Strict I's and O's, daily standing weights, 2 g Na+ diet 1.8 LFR.  Monitor renal function and electrolytes closely.  Replete to maintain K+ level 4.0 magnesium level 2.0.  Continue to monitor on telemetry.        HPI: Eric Yeager 64 y.o. year old male with a medical history of hepatocellular carcinoma, chronic hep C, suspected liver cirrhosis, and heavy alcohol abuse.  Longstanding smoking history, no recent illicit/recreational drug use..  He did not previously follow with a routine cardiology provider prior to admission.  He presented to the [facility] campus on 3/23/2025 with complaints of aggressive SOB/THOMPSON, cough and palpitations.  Hemodynamics on admission; temp 97.9 degrees F, , RR 24, /98, sat 98% on RA.    Lab data on admission: Na+ 136, K+ 4.9, chloride 108, CO2 23, anion gap 5, BUN 23, creatinine 1.4, glucose 120, calcium 8.8, , ALT 95, alk phos 72, T. bili 1.3, WBC 7.2, Hgb 14.6 and platelet count 166.    BNP level significantly elevated at 2201  HS troponin levels flat 100 - 110 - 100    Imaging  Chest x-ray imaging-mild pulmonary edema/small bilateral pleural effusions.  ECG on admission demonstrated atrial fibrillation with RVR, rate 166 bpm.    In the ED he received IV Cardizem 20 mg x 2 and subsequently initiated on a IV Cardizem GTT currently infusing at 15 mg/hr in addition to metoprolol tartrate 25 mg twice daily.  He also received 1 dose of IV furosemide 40 mg while in the ED given concern for acute CHF.  Cardiology has now been consulted for further treatment recommendations/management.    Family History: No family history on file.  Historical Information   Past Medical History:   Diagnosis Date    Hepatitis C     Pancreatitis      Past Surgical History:   Procedure Laterality Date    IR  BIOPSY LIVER MASS  8/31/2022     Social History   Social History     Substance and Sexual Activity   Alcohol Use Not Currently    Alcohol/week: 1.0 standard drink of alcohol    Types: 1 Cans of beer per week    Comment: one 12 oz. can yesterday 09/19/22, down from a case a day     Social History     Substance and Sexual Activity   Drug Use Never     Social History     Tobacco Use   Smoking Status Every Day    Current packs/day: 0.50    Types: Cigarettes   Smokeless Tobacco Never     Family History: No family history on file.    Review of Systems:  Review of Systems   Constitutional:  Negative for chills, fatigue and fever.   Eyes:  Negative for visual disturbance.   Respiratory:  Negative for cough, chest tightness and shortness of breath.    Cardiovascular:  Negative for chest pain, palpitations and leg swelling.   Gastrointestinal:  Negative for abdominal pain.   Neurological:  Negative for dizziness, light-headedness and headaches.   All other systems reviewed and are negative.          Scheduled Meds:  Current Facility-Administered Medications   Medication Dose Route Frequency Provider Last Rate    acetaminophen  650 mg Oral Q6H PRN DAGOBERTO Joyce      benzonatate  100 mg Oral TID PRN DAGOBERTO Joyce      diltiazem  1-15 mg/hr Intravenous Titrated Andrew Rehman MD 15 mg/hr (03/24/25 0314)    folic acid  1 mg Oral Daily DAGOBERTO Joyce      heparin (porcine)  5,000 Units Subcutaneous Q8H ZULEYKA DAGOBERTO Joyce      melatonin  6 mg Oral HS Hannah Abad DO      metoprolol tartrate  25 mg Oral BID DAGOBERTO Joyce      multi-electrolyte  75 mL/hr Intravenous Continuous Ivania Desai MD 75 mL/hr (03/24/25 0808)    multivitamin-minerals  1 tablet Oral Daily DAGOBERTO Joyce      nicotine  1 patch Transdermal Daily DAGOBERTO Joyce      thiamine  100 mg Oral Daily DAGOBERTO Joyce       Continuous Infusions:diltiazem, 1-15 mg/hr, Last Rate: 15 mg/hr (03/24/25 0314)  multi-electrolyte, 75 mL/hr, Last  Rate: 75 mL/hr (03/24/25 0808)      PRN Meds:.  acetaminophen    benzonatate  all current active meds have been reviewed and current meds:   Current Facility-Administered Medications:     acetaminophen (TYLENOL) tablet 650 mg, Q6H PRN    benzonatate (TESSALON PERLES) capsule 100 mg, TID PRN    diltiazem (CARDIZEM) 125 mg in sodium chloride 0.9 % 125 mL infusion, Titrated, Last Rate: 15 mg/hr (03/24/25 0314)    folic acid (FOLVITE) tablet 1 mg, Daily    heparin (porcine) subcutaneous injection 5,000 Units, Q8H ZULEYKA    melatonin tablet 6 mg, HS    metoprolol tartrate (LOPRESSOR) tablet 25 mg, BID    multi-electrolyte (Plasmalyte-A/Isolyte-S PH 7.4/Normosol-R) IV solution, Continuous, Last Rate: 75 mL/hr (03/24/25 0808)    multivitamin-minerals (CENTRUM) tablet 1 tablet, Daily    nicotine (NICODERM CQ) 14 mg/24hr TD 24 hr patch 1 patch, Daily    thiamine tablet 100 mg, Daily    No Known Allergies    Objective   Vitals: Blood pressure 106/61, pulse 104, temperature 97.9 °F (36.6 °C), temperature source Oral, resp. rate 18, SpO2 97%., There is no height or weight on file to calculate BMI.,   Orthostatic Blood Pressures      Flowsheet Row Most Recent Value   Blood Pressure 106/61 filed at 03/24/2025 0900   Patient Position - Orthostatic VS Lying filed at 03/24/2025 0900              Intake/Output Summary (Last 24 hours) at 3/24/2025 0923  Last data filed at 3/24/2025 0501  Gross per 24 hour   Intake 570 ml   Output 800 ml   Net -230 ml       Invasive Devices       Peripheral Intravenous Line  Duration             Peripheral IV 03/23/25 Distal;Left;Upper;Ventral (anterior) Antecubital <1 day    Peripheral IV 03/23/25 Distal;Right;Upper;Ventral (anterior) Antecubital <1 day    Peripheral IV 03/23/25 Right;Ventral (anterior) Forearm <1 day                    Physical Exam:  Physical Exam  Vitals and nursing note reviewed.   Constitutional:       General: He is not in acute distress.     Appearance: He is not diaphoretic.    HENT:      Head: Normocephalic and atraumatic.   Eyes:      General: No scleral icterus.  Cardiovascular:      Rate and Rhythm: Tachycardia present. Rhythm irregular.      Pulses: Normal pulses.      Heart sounds: Normal heart sounds.   Pulmonary:      Effort: Pulmonary effort is normal.      Breath sounds: Rales present. No wheezing.   Abdominal:      Palpations: Abdomen is soft.   Musculoskeletal:      Right lower leg: Edema present.      Left lower leg: Edema present.      Comments: Mild nonpitting bilateral lower extremity edema.   Skin:     General: Skin is warm and dry.      Capillary Refill: Capillary refill takes less than 2 seconds.   Neurological:      General: No focal deficit present.      Mental Status: He is alert and oriented to person, place, and time.   Psychiatric:         Mood and Affect: Mood normal.         Lab Results:   Recent Results (from the past 24 hours)   ECG 12 lead    Collection Time: 03/23/25  5:37 PM   Result Value Ref Range    Ventricular Rate 166 BPM    Atrial Rate 166 BPM    AL Interval  ms    QRSD Interval 78 ms    QT Interval 276 ms    QTC Interval 458 ms    P Axis  degrees    QRS Axis 103 degrees    T Wave Axis -65 degrees   Comprehensive metabolic panel    Collection Time: 03/23/25  5:52 PM   Result Value Ref Range    Sodium 136 135 - 147 mmol/L    Potassium 4.9 3.5 - 5.3 mmol/L    Chloride 108 96 - 108 mmol/L    CO2 23 21 - 32 mmol/L    ANION GAP 5 4 - 13 mmol/L    BUN 23 5 - 25 mg/dL    Creatinine 1.41 (H) 0.60 - 1.30 mg/dL    Glucose 120 65 - 140 mg/dL    Calcium 8.8 8.4 - 10.2 mg/dL     (H) 13 - 39 U/L    ALT 95 (H) 7 - 52 U/L    Alkaline Phosphatase 72 34 - 104 U/L    Total Protein 7.1 6.4 - 8.4 g/dL    Albumin 3.7 3.5 - 5.0 g/dL    Total Bilirubin 1.30 (H) 0.20 - 1.00 mg/dL    eGFR 52 ml/min/1.73sq m   CBC and differential    Collection Time: 03/23/25  5:52 PM   Result Value Ref Range    WBC 7.15 4.31 - 10.16 Thousand/uL    RBC 4.81 3.88 - 5.62 Million/uL     "Hemoglobin 14.6 12.0 - 17.0 g/dL    Hematocrit 46.3 36.5 - 49.3 %    MCV 96 82 - 98 fL    MCH 30.4 26.8 - 34.3 pg    MCHC 31.5 31.4 - 37.4 g/dL    RDW 14.6 11.6 - 15.1 %    MPV 10.8 8.9 - 12.7 fL    Platelets 166 149 - 390 Thousands/uL    nRBC 0 /100 WBCs    Segmented % 53 43 - 75 %    Immature Grans % 0 0 - 2 %    Lymphocytes % 31 14 - 44 %    Monocytes % 10 4 - 12 %    Eosinophils Relative 5 0 - 6 %    Basophils Relative 1 0 - 1 %    Absolute Neutrophils 3.86 1.85 - 7.62 Thousands/µL    Absolute Immature Grans 0.02 0.00 - 0.20 Thousand/uL    Absolute Lymphocytes 2.20 0.60 - 4.47 Thousands/µL    Absolute Monocytes 0.69 0.17 - 1.22 Thousand/µL    Eosinophils Absolute 0.32 0.00 - 0.61 Thousand/µL    Basophils Absolute 0.06 0.00 - 0.10 Thousands/µL   B-Type Natriuretic Peptide(BNP)    Collection Time: 03/23/25  5:52 PM   Result Value Ref Range    BNP 2,201 (H) 0 - 100 pg/mL   Magnesium    Collection Time: 03/23/25  5:52 PM   Result Value Ref Range    Magnesium 1.9 1.9 - 2.7 mg/dL   HS Troponin 0hr (reflex protocol)    Collection Time: 03/23/25  5:56 PM   Result Value Ref Range    hs TnI 0hr 103 (H) \"Refer to ACS Flowchart\"- see link ng/L   HS Troponin I 2hr    Collection Time: 03/23/25  8:06 PM   Result Value Ref Range    hs TnI 2hr 110 (H) \"Refer to ACS Flowchart\"- see link ng/L    Delta 2hr hsTnI 7 <20 ng/L   TSH, 3rd generation with Free T4 reflex    Collection Time: 03/23/25  8:06 PM   Result Value Ref Range    TSH 3RD GENERATON 3.027 0.450 - 4.500 uIU/mL   ECG 12 lead    Collection Time: 03/23/25  8:40 PM   Result Value Ref Range    Ventricular Rate 125 BPM    Atrial Rate 107 BPM    AK Interval  ms    QRSD Interval 82 ms    QT Interval 316 ms    QTC Interval 456 ms    P Axis  degrees    QRS Axis 109 degrees    T Wave Axis -57 degrees   COVID/FLU/RSV    Collection Time: 03/23/25  9:32 PM    Specimen: Nose; Nares   Result Value Ref Range    SARS-CoV-2 Negative Negative    INFLUENZA A PCR Negative Negative    " "INFLUENZA B PCR Negative Negative    RSV PCR Negative Negative   HS Troponin I 4hr    Collection Time: 03/23/25  9:45 PM   Result Value Ref Range    hs TnI 4hr 100 (H) \"Refer to ACS Flowchart\"- see link ng/L    Delta 4hr hsTnI -3 <20 ng/L   ECG 12 lead    Collection Time: 03/23/25  9:46 PM   Result Value Ref Range    Ventricular Rate 104 BPM    Atrial Rate 375 BPM    MS Interval  ms    QRSD Interval 80 ms    QT Interval 392 ms    QTC Interval 515 ms    P Axis  degrees    QRS Axis 109 degrees    T Wave Axis -63 degrees   Basic metabolic panel    Collection Time: 03/24/25  5:35 AM   Result Value Ref Range    Sodium 138 135 - 147 mmol/L    Potassium 4.0 3.5 - 5.3 mmol/L    Chloride 108 96 - 108 mmol/L    CO2 25 21 - 32 mmol/L    ANION GAP 5 4 - 13 mmol/L    BUN 24 5 - 25 mg/dL    Creatinine 1.54 (H) 0.60 - 1.30 mg/dL    Glucose 134 65 - 140 mg/dL    Calcium 8.6 8.4 - 10.2 mg/dL    eGFR 47 ml/min/1.73sq m   Magnesium    Collection Time: 03/24/25  5:35 AM   Result Value Ref Range    Magnesium 2.1 1.9 - 2.7 mg/dL   CBC (With Platelets)    Collection Time: 03/24/25  5:35 AM   Result Value Ref Range    WBC 7.39 4.31 - 10.16 Thousand/uL    RBC 4.31 3.88 - 5.62 Million/uL    Hemoglobin 13.2 12.0 - 17.0 g/dL    Hematocrit 41.8 36.5 - 49.3 %    MCV 97 82 - 98 fL    MCH 30.6 26.8 - 34.3 pg    MCHC 31.6 31.4 - 37.4 g/dL    RDW 14.5 11.6 - 15.1 %    Platelets 150 149 - 390 Thousands/uL    MPV 11.0 8.9 - 12.7 fL       * Please Note: Fluency DirectDictation voice to text software may have been used in the creation of this document. **  "

## 2025-03-24 NOTE — ASSESSMENT & PLAN NOTE
Began about 2 weeks ago. Unclear etiology.  Currently tolerating oral diet    Plan:  Consider speech consult after workup for hepatocellular carcinoma and A-fib RVR are complete

## 2025-03-24 NOTE — UTILIZATION REVIEW
Initial Clinical Review    OBSERVATION  3/23/25 @ 2039  CONVERTED TO INPATIENT ADMISSION  3/24/25 @ 1359  DUE TO CONTINUED STAY REQUIRED TO EVALUATE AND TREAT PATIENT WITH A FIB W/ RVR ,  HEART FAILURE  WITH TELEMETRY,IV ANTIARRHYTHMICS,  IV LASIX . ENLARGED R HEPATIC MASS WITH ONGOING WORKUP  .    Admission: Date/Time/Statement:   Admission Orders (From admission, onward)       Ordered        03/24/25 1359  INPATIENT ADMISSION  Once            03/23/25 2039  Place in Observation  Once                          Orders Placed This Encounter   Procedures    INPATIENT ADMISSION     Standing Status:   Standing     Number of Occurrences:   1     Level of Care:   Med Surg [16]     Estimated length of stay:   More than 2 Midnights     Certification:   I certify that inpatient services are medically necessary for this patient for a duration of greater than two midnights. See H&P and MD Progress Notes for additional information about the patient's course of treatment.     ED Arrival Information       Expected   -    Arrival   3/23/2025 17:25    Acuity   Urgent              Means of arrival   Walk-In    Escorted by   Family Member    Service   Hospitalist    Admission type   Emergency              Arrival complaint   SOB             Chief Complaint   Patient presents with    Cough     Cough, SOS, THOMPSON, ABD pain vomiting after eating x 3 weeks       Initial Presentation: 64 y.o. male with hx chronic hepatitis C (untreated), former drug use, alcohol abuse(was drinking 1 case of beer daily and  most recently about 2 weeks ago he had cut down to 6 cans of beer daily. His last drink was 2 weeks ago) , suspected cirrhosis, hepatocellular carcinoma who presents to ED from home with cough, shortness of breath and exercise intolerance  for the past 2 to 3 weeks.  He also reports a vague left lower quadrant abdominal discomfort at times. Pt reports difficulty swallowing x 2 weeks On exam, pt tachycardic - irreg rhythm. Rales present , +1  edema BLE . Pt currently tolerating oral intake . Initial EKG: A fib  with heart rate of 166.  Labs : troponin   103--->110, BNP 2201 . Creat 1.4 from 0.8 two yrs ago . Mag 1.9 CXR shows pulm edema w/ small pleural effusions . Pt given Cardizem bolus  and started on Cardizem drip in ED with rates now improved to the 130s. Pt admitted as OBS to telemetry with A fib w/ RVR , alcohol abuse, ALTON . Plan- Telemetry.Start metoprolol 25 mg po BID .  Titrate Cardizem drip to off as able.  Cardiology consult . Echo . F/U TSH. IV Lasix 40 mg x 1 . Replete mag . CIWA monitoring. Supplements . Monitor BMP w/ diuresis. .     Anticipated Length of Stay/Certification Statement:  Patient will be admitted on an observation basis with an anticipated length of stay of less than 2 midnights secondary to afib, rvr.       Date: 3/24    Converted to IP  New dx A fib w/ RVR  . Acute CHF  . Suspected cirrhosis and hepatocellular cancer    Creat  up to 1.54 .  IVF started this am then d/c'ed  .CIWA 0 this am . Pt states he was advised treatment for hep C in the past however he declined. RUQ US today  showed enlarged right hepatic mass now measuring 9.6 cm. Originally measured 3.7 cm on 8/29/2022. Chronic hepatitis panel has been obtained and pending. Obtain CT C/A/P  to evaluate for any distant metastatic disease. He has biopsy-proven HCC from 2022. Med Onc consult placed . GI also consulted for treatment of hep C as this will be needed prior to HCC treatment.   Pt continues on Cardizem drip , 15 mg/hr with  HR  130's  .Initiated on metoprolol tartrate 25 mg, though did not meet blood pressure parameters until 3/24 AM . Pt continues to deny CP and palpitations, though endorses SOB with exertion and episodes of coughing that have improved since 3/23 .+1 pitting edema BLE . Abdomen distended, mild abdominal tenderness.  TSH WNL . Per cardiology consult today , give 1 additional dose of IV Lasix 40 mg today . F/U TTE. Depending on ejection  fraction, we may proceed with JOSEPH guided cardioversion over the next day or 2 once optimized from a volume perspective.  Increase Metoprolol tartrate to 50 mg q6h, wean IV Cardizem gtt for HR goal  <110's . Start Eliquis 5 mg po BID. Continue telemetry . 1/o, daily wt . 1.8 L FR, 2 Gm na diet . Monitor renal function and electrolytes, goal K+ > 4 and Mg > 2     Med Onc consult-  consult  given hepatic cellular carcinoma progression from 3.7 to 9.6 x 6.9 x 5.5 cm. Recommendations :  order alpha-fetoprotein  . Will need hepatitis C treated prior to any systemic therapy   Consider surgical oncology evaluation if staging imaging shows no signs of metastatic disease  .      Date 3/25 Day 2    Pt has rapid response called early am today . Pt c/o severe abdominal pain , was bradycardic . Abdominal was mildly distended without guarding/rebound/diffuse tenderness.Pt tacypneic Telemetry showed A fib in  in 40's-50's . Skin was cool to touch however pulses were +2 in all extremities, lungs were clear .Patient was still on Cardizem drip 12.5 mg.  Advised the nurse to titrate down the Cardizem drip to 10. Suddenly, patient O2 dropped to 80s and he became unresponsive.Cardizem drip d/c'ed  Lasix 40 mg IV x 1 ordered  . . Pt   upgraded to level 2 SD /ICU. On arrival to ICU, pt received  1mg Atropine was given  . STAT CTH ordered  .Started on Epinephrine 10 and escalated to 20 .  1 amp of Bicarbonate and 1g Calcium Chloride . Cardiac US demonstrated severely depressed EF <10% . Lost pulses and perfusing pressure  . Repeat Cardiac US demonstrated no cardiac function  .Asystole  Patient was DNR/DNI and he was pronounced dead on 3/25/2025 at 5:15 AM.          ED Treatment-Medication Administration from 03/23/2025 1724 to 03/24/2025 0852         Date/Time Order Dose Route Action     03/23/2025 1801 diltiazem (CARDIZEM) 125 mg in sodium chloride 0.9 % 125 mL infusion 5 mg/hr Intravenous New Bag     03/23/2025 1816 diltiazem (CARDIZEM)  125 mg in sodium chloride 0.9 % 125 mL infusion 7.5 mg/hr Intravenous Rate/Dose Change     03/23/2025 1838 diltiazem (CARDIZEM) 125 mg in sodium chloride 0.9 % 125 mL infusion 10 mg/hr Intravenous Rate/Dose Change     03/23/2025 2006 diltiazem (CARDIZEM) 125 mg in sodium chloride 0.9 % 125 mL infusion 12.5 mg/hr Intravenous Rate/Dose Change     03/23/2025 2029 diltiazem (CARDIZEM) 125 mg in sodium chloride 0.9 % 125 mL infusion 15 mg/hr Intravenous Rate/Dose Change     03/24/2025 0314 diltiazem (CARDIZEM) 125 mg in sodium chloride 0.9 % 125 mL infusion 15 mg/hr Intravenous New Bag     03/23/2025 1752 diltiazem (CARDIZEM) injection 20 mg 20 mg Intravenous Given     03/23/2025 1813 folic acid 1 mg, thiamine (VITAMIN B1) 100 mg in sodium chloride 0.9 % 100 mL IV piggyback -- Intravenous New Bag     03/23/2025 1840 magnesium sulfate 2 g/50 mL IVPB (premix) 2 g 2 g Intravenous New Bag     03/23/2025 1916 diltiazem (CARDIZEM) injection 20 mg 20 mg Intravenous Given     03/24/2025 0807 nicotine (NICODERM CQ) 14 mg/24hr TD 24 hr patch 1 patch 1 patch Transdermal Medication Applied     03/24/2025 0806 metoprolol tartrate (LOPRESSOR) tablet 25 mg 25 mg Oral Given     03/24/2025 0807 thiamine tablet 100 mg 100 mg Oral Given     03/24/2025 0807 folic acid (FOLVITE) tablet 1 mg 1 mg Oral Given     03/24/2025 0807 multivitamin-minerals (CENTRUM) tablet 1 tablet 1 tablet Oral Given     03/23/2025 2131 furosemide (LASIX) injection 40 mg 40 mg Intravenous Given     03/23/2025 2131 heparin (porcine) subcutaneous injection 5,000 Units 5,000 Units Subcutaneous Given     03/24/2025 0535 heparin (porcine) subcutaneous injection 5,000 Units 5,000 Units Subcutaneous Given     03/24/2025 0808 multi-electrolyte (Plasmalyte-A/Isolyte-S PH 7.4/Normosol-R) IV solution 75 mL/hr Intravenous New Bag            Scheduled Medications:  metoprolol tartrate (LOPRESSOR) tablet 25 mg  Dose: 25 mg  Freq: 2 times daily Route: PO  Start: 03/23/25 2045 End:  03/24/25 0936  heparin (porcine) subcutaneous injection 5,000 Units  Dose: 5,000 Units  Freq: Every 8 hours scheduled Route: SC  Start: 03/23/25 2200 End: 03/24/25 1244  furosemide (LASIX) injection 40 mg  Dose: 40 mg  Freq: Once Route: IV X1 3/24 @ 0958   calcium gluconate 1 g in sodium chloride 0.9% 50 mL (premix)  Dose: 1 g  Freq: Once Route: IV  Last Dose: 1 g (03/25/25 0417)  Start: 03/25/25 0415 End: 03/25/25 044  furosemide (LASIX) injection 40 mg  Dose: 40 mg  Freq: Once Route: IV  Start: 03/25/25 0430      Continuous IV Infusions:  EPINEPHrine 5,000 mcg (STANDARD CONCENTRATION) IV in sodium chloride 0.9% 250 mL  Rate: 3-60 mL/hr Dose: 1-20 mcg/min  Freq: Titrated Route: IV  Start: 03/25/25 0500    diltiazem, 1-15 mg/hr, Intravenous, Titrated  End: 03/25/25 0410   multi-electrolyte (Plasmalyte-A/Isolyte-S PH 7.4/Normosol-R) IV solution  Rate: 75 mL/hr Dose: 75 mL/hr  Freq: Continuous Route: IV  Indications of Use: IV Hydration,IV Resuscitation  Last Dose: Stopped (03/24/25 0951)  Start: 03/24/25 0730 End: 03/24/25 0949    PRN Meds:  acetaminophen, 650 mg, Oral, Q6H PRN  benzonatate, 100 mg, Oral, TID PRN      ED Triage Vitals   Temperature Pulse Respirations Blood Pressure SpO2 Pain Score   03/23/25 1735 03/23/25 1735 03/23/25 1735 03/23/25 1735 03/23/25 1735 03/23/25 2138   97.9 °F (36.6 °C) (!) 145 (!) 24 156/98 98 % No Pain     Weight (last 2 days)       Date/Time Weight    03/24/25 17:40:55 82.1 (181)    03/24/25 1550 71.7 (158)            Vital Signs (last 3 days)       Date/Time Temp Pulse Resp BP MAP (mmHg) SpO2 O2 Device Patient Position - Orthostatic VS Maryann Coma Scale Score CIWA-Ar Total Pain    03/25/25 0353 -- -- -- -- -- -- -- -- -- -- 10 - Worst Possible Pain    03/25/25 0200 -- -- -- -- -- -- -- -- -- 0 --    03/25/25 00:20:38 -- 60 -- 109/76 87 96 % -- -- -- -- --    03/24/25 2200 -- -- -- 104/68 -- -- -- -- -- 3 --    03/24/25 21:38:10 -- -- -- 104/68 80 -- -- -- -- -- --    03/24/25 2100  -- -- -- -- -- -- -- -- 15 -- No Pain    03/24/25 1759 -- -- -- -- -- -- -- -- 15 -- --    03/24/25 1753 -- -- -- -- -- -- -- -- -- -- No Pain    03/24/25 17:40:55 -- 77 18 124/78 93 96 % None (Room air) Lying -- 0 --    03/24/25 1724 -- 118 -- -- -- 98 % None (Room air) -- -- -- --    03/24/25 1700 -- 102 18 126/99 108 98 % None (Room air) Lying -- -- --    03/24/25 1620 -- 113 -- -- -- -- -- -- -- -- --    03/24/25 1550 -- 84 -- 114/83 -- -- -- -- -- -- --    03/24/25 1500 -- 84 18 114/83 94 96 % None (Room air) Lying -- -- --    03/24/25 1400 -- 95 18 108/79 87 97 % None (Room air) Lying -- -- --    03/24/25 1300 -- 91 18 114/79 93 95 % None (Room air) Lying -- -- --    03/24/25 1200 -- 64 18 111/74 88 98 % None (Room air) Lying -- -- --    03/24/25 1111 -- 76 -- 125/80 -- -- -- -- -- -- --    03/24/25 0900 -- 104 18 106/61 77 97 % None (Room air) Lying -- -- --    03/24/25 0831 -- 136 -- 129/90 -- -- -- -- -- 0 --    03/24/25 0830 -- 136 -- 129/90 105 96 % None (Room air) Lying -- -- --    03/24/25 0806 -- 128 -- 112/75 -- -- -- -- -- -- --    03/24/25 0700 -- 101 18 106/56 77 96 % None (Room air) Lying -- -- --    03/24/25 0601 -- 127 -- 142/103 114 -- -- -- -- -- --    03/24/25 0319 -- -- -- -- -- 97 % -- -- -- -- --    03/24/25 0230 -- 117 -- 107/87 94 -- -- -- -- -- --    03/24/25 0030 -- 110 -- 101/86 91 -- -- -- -- -- --    03/23/25 2230 -- 113 -- 131/78 98 -- -- -- -- -- --    03/23/25 2200 -- 144 22 131/93 -- -- -- -- -- 2 --    03/23/25 2142 -- -- -- -- -- -- -- -- 15 -- --    03/23/25 2138 -- -- -- -- -- -- -- -- -- -- No Pain    03/23/25 2100 -- 134 -- 140/95 110 -- -- -- -- -- --    03/23/25 2029 -- 130 -- 115/86 -- -- -- -- -- -- --    03/23/25 2025 -- 118 -- -- -- -- -- -- -- -- --    03/23/25 2006 -- 133 -- 115/89 -- -- -- -- -- -- --    03/23/25 2000 -- -- -- -- -- 93 % -- -- -- -- --    03/23/25 1900 -- 136 22 123/86 99 95 % -- -- -- -- --    03/23/25 1845 -- 147 22 117/98 105 95 % None (Room  air) -- -- -- --    03/23/25 1838 -- 144 -- 117/98 -- -- -- -- -- -- --    03/23/25 1830 -- 148 20 115/86 96 95 % None (Room air) -- -- -- --    03/23/25 1816 -- 151 -- 119/88 -- -- -- -- -- -- --    03/23/25 1815 -- 144 20 132/91 105 94 % -- -- -- -- --    03/23/25 1801 -- 153 -- -- -- -- -- -- -- -- --    03/23/25 1750 -- -- -- -- -- -- None (Room air) -- 15 -- --    03/23/25 1735 97.9 °F (36.6 °C) 145 24 156/98 124 98 % None (Room air) -- -- -- --           CIWA-Ar Score       Row Name 03/25/25 0200 03/24/25 2200 03/24/25 17:40:55       CIWA-Ar    BP -- 104/68 --    Nausea and Vomiting 0 0 0    Tactile Disturbances 0 0 0    Tremor 0 0 0    Auditory Disturbances 0 0 0    Paroxysmal Sweats 0 0 0    Visual Disturbances 0 0 0    Anxiety 0 2 0    Headache, Fullness in Head 0 0 0    Agitation 0 1 0    Orientation and Clouding of Sensorium 0 0 0    CIWA-Ar Total 0 3 0      Row Name 03/24/25 0831 03/23/25 2200          CIWA-Ar    /90 131/93     Pulse 136 144     Nausea and Vomiting 0 0     Tactile Disturbances 0 0     Tremor 0 1     Auditory Disturbances 0 0     Paroxysmal Sweats 0 0     Visual Disturbances 0 0     Anxiety 0 1     Headache, Fullness in Head 0 0     Agitation 0 0     Orientation and Clouding of Sensorium 0 0     CIWA-Ar Total 0 2                     Pertinent Labs/Diagnostic Test Results:   Radiology:  CT chest abdomen pelvis wo contrast   Final Interpretation by Edin Turner MD (03/24 2253)      1.  Very subtle subpleural branching tree-in-bud like nodular densities in the bilateral upper lobes noted which could be related to small airway infection/bronchiolitis. Recommend clinical correlation.   2.  Moderate bilateral pleural effusions with bibasilar dependent atelectasis.   3.  Air density foci within the right atrium could be related to recent injection or pneumatic IV insertion. Recommend clinical correlation. Pericardial calcifications and trace pericardial effusion are seen.   4.  Mild  diffuse subcutaneous edema in the lower chest and upper to mid abdominal region.   5.  New ill-defined irregular hypodense area in the anterior right hepatic lobe is seen measuring approximately 4.7 cm. Consider liver mass protocol contrast-enhanced multiphase abdominal MRI for better characterization. Subtle lobulated contour of the    liver parenchyma is seen.   6.  Cholelithiasis without evidence for acute cholecystitis or significant biliary ductal dilatation.   7.  Mild asymmetrical atrophy of the left kidney compared to the right. No nephrolithiasis or hydronephrosis bilaterally.   8.  No evidence of bowel obstruction, inflammation, appendicitis, or free air. Small to moderate volume of abdominal/pelvic ascites.   9.  Additional ancillary findings detailed above.               Workstation performed: [id number]         US right upper quadrant with liver dopplers   Final Interpretation by Cameron Wallace MD (03/24 5722)      Enlarged right hepatic mass now measuring 9.6 cm measured 3.7 cm on the 8/29/2022 MRI.      Patent hepatic vessels.      Workstation performed: [id number]         XR chest portable   ED Interpretation by Andrew Rehman MD (03/23 0560)   Mild increased pulmonary congestion.  Independently interpreted by myself.      Final Interpretation by Shy Ascencio MD (03/24 6739)      Mild pulmonary edema with small pleural effusions.            Workstation performed: [id number]         XR chest portable    (Results Pending)     Cardiology:   3/23 ECG- ED read    ECG rate:  166     ECG rate assessment: tachycardic    Rhythm:     Rhythm: atrial fibrillation    Ectopy:     Ectopy: none    QRS:     QRS axis:  Normal     QRS intervals:  Normal   Conduction:     Conduction: normal    ST segments:     ST segments:  Normal   T waves:     T waves: normal    Comments:      Atrial fibrillation with rapid ventricular response at 166 bpm, no PAC,   no PVC, no acute ischemic changes.        Echo complete w/ contrast if indicated   Final Result by Sony Palacios DO (03/24 1619)        Left Ventricle: Left ventricular cavity size is normal. Wall thickness    is normal. The left ventricular ejection fraction is 25%. Systolic    function is severely reduced. There is severe global hypokinesis with    regional variation. Diastolic function is abnormal.     Right Ventricle: Right ventricular cavity size is moderately dilated.    Systolic function is moderately reduced.     Left Atrium: The atrium is moderately dilated.     Right Atrium: The atrium is moderately dilated.     Mitral Valve: There is mild to moderate regurgitation.     Tricuspid Valve: There is mild regurgitation. The estimated right    ventricular systolic pressure is 29.00 mmHg.     Pericardium: There is a trivial pericardial effusion. There is a left    pleural effusion.         ECG 12 lead   Final Result by Kevin Perry MD (03/24 0917)   Atrial fibrillation with rapid ventricular response   Anterolateral infarct (cited on or before 23-Mar-2025)   ST & T wave abnormality, consider inferior ischemia   Abnormal ECG   When compared with ECG of 23-Mar-2025 20:40, (unconfirmed)   No significant change was found   Confirmed by Kevin Perry ([id number]) on 3/24/2025 9:17:10 AM      ECG 12 lead   Final Result by Kevin Perry MD (03/24 0917)   Atrial fibrillation with rapid ventricular response   Anterolateral infarct (cited on or before 23-Mar-2025)   T wave abnormality, consider inferior ischemia   Abnormal ECG   When compared with ECG of 23-Mar-2025 17:37, (unconfirmed)   No significant change was found   Confirmed by Kevin Perry ([id number]) on 3/24/2025 9:17:47 AM      ECG 12 lead   Final Result by Kevin Perry MD (03/24 0920)   Atrial fibrillation with rapid ventricular response   Anterolateral infarct (cited on or before 23-Mar-2025)   ST & T wave abnormality, consider inferior ischemia   Abnormal ECG   When compared with  "ECG of 27-Aug-2022 13:50,   Atrial fibrillation has replaced Sinus rhythm   Vent. rate has increased by  79 bpm   Questionable change in initial forces of Anterolateral leads   T wave inversion now evident in Inferior leads   Nonspecific T wave abnormality now evident in Lateral leads   Confirmed by Kevin Perry ([id number]) on 3/24/2025 9:20:26 AM              Results from last 7 days   Lab Units 03/23/25 2132   SARS-COV-2  Negative     Results from last 7 days   Lab Units 03/25/25 0414 03/24/25  0535 03/23/25  1752   WBC Thousand/uL 8.51 7.39 7.15   HEMOGLOBIN g/dL 14.6 13.2 14.6   HEMATOCRIT % 48.5 41.8 46.3   PLATELETS Thousands/uL 184 150 166   TOTAL NEUT ABS Thousands/µL 5.03  --  3.86         Results from last 7 days   Lab Units 03/25/25 0414 03/24/25 0535 03/23/25  1752   SODIUM mmol/L 136 138 136   POTASSIUM mmol/L 5.4* 4.0 4.9   CHLORIDE mmol/L 105 108 108   CO2 mmol/L 12* 25 23   ANION GAP mmol/L 19* 5 5   BUN mg/dL 28* 24 23   CREATININE mg/dL 2.68* 1.54* 1.41*   EGFR ml/min/1.73sq m 24 47 52   CALCIUM mg/dL 9.1 8.6 8.8   MAGNESIUM mg/dL  --  2.1 1.9     Results from last 7 days   Lab Units 03/25/25 0422 03/25/25 0414 03/23/25  1752   AST U/L  --  128* 111*   ALT U/L  --  106* 95*   ALK PHOS U/L  --  68 72   TOTAL PROTEIN g/dL  --  7.1 7.1   ALBUMIN g/dL  --  3.6 3.7   TOTAL BILIRUBIN mg/dL  --  2.71* 1.30*   AMMONIA umol/L 51  --   --      Results from last 7 days   Lab Units 03/25/25 0359 03/24/25 2133   POC GLUCOSE mg/dl 120 270*     Results from last 7 days   Lab Units 03/25/25 0414 03/24/25  0535 03/23/25  1752   GLUCOSE RANDOM mg/dL 122 134 120         Results from last 7 days   Lab Units 03/24/25  0535   HEMOGLOBIN A1C % 6.1*   EAG mg/dl 128     No results found for: \"BETA-HYDROXYBUTYRATE\"                   Results from last 7 days   Lab Units 03/25/25  0414 03/23/25  2145 03/23/25 2006 03/23/25  1756   HS TNI 0HR ng/L 91*  --   --  103*   HS TNI 2HR ng/L  --   --  110*  --    HSTNI D2 " ng/L  --   --  7  --    HS TNI 4HR ng/L  --  100*  --   --    HSTNI D4 ng/L  --  -3  --   --              Results from last 7 days   Lab Units 03/23/25 2006   TSH 3RD GENERATON uIU/mL 3.027     Results from last 7 days   Lab Units 03/25/25  0422   PROCALCITONIN ng/ml 0.15     Results from last 7 days   Lab Units 03/25/25  0414   LACTIC ACID mmol/L 8.0*             Results from last 7 days   Lab Units 03/23/25  1752   BNP pg/mL 2,201*                                         Results from last 7 days   Lab Units 03/23/25  2132   INFLUENZA A PCR  Negative   INFLUENZA B PCR  Negative   RSV PCR  Negative                                               Past Medical History:   Diagnosis Date    Hepatitis C     Pancreatitis      Present on Admission:   Alcohol abuse   Nicotine dependence   Hepatocellular carcinoma (HCC)      Admitting Diagnosis: CHF (congestive heart failure) (HCC) [I50.9]  Hepatocellular carcinoma (HCC) [C22.0]  SOB (shortness of breath) [R06.02]  Elevated troponin [R79.89]  Atrial fibrillation with RVR (HCC) [I48.91]  Elevated brain natriuretic peptide (BNP) level [R79.89]  Chronic hepatitis C without hepatic coma (HCC) [B18.2]  Age/Sex: 64 y.o. male    Network Utilization Review Department  ATTENTION: Please call with any questions or concerns to [phone] and carefully listen to the prompts so that you are directed to the right person. All voicemails are confidential.   For Discharge needs, contact Care Management DC Support Team at [phone] opt. [phone]  Send all requests for admission clinical reviews, approved or denied determinations and any other requests to dedicated fax number below belonging to the campus where the patient is receiving treatment. List of dedicated fax numbers for the Facilities:  FACILITY NAME UR FAX NUMBER   ADMISSION DENIALS (Administrative/Medical Necessity) [phone]   DISCHARGE SUPPORT TEAM (NETWORK) [phone]   PARENT CHILD HEALTH (Maternity/NICU/Pediatrics)  [phone]   [facility] [phone]   [facility] [phone]   [facility] [phone]   [facility] [phone]   [facility] [phone]   [facility] [phone]   [facility] [phone]   [facility] [phone]   [facility] [phone]   [facility] [phone]   [facility] [phone]   [facility] [phone]

## 2025-03-24 NOTE — ASSESSMENT & PLAN NOTE
New onset with initial HR of 166.  Troponin at 0h, 2h, and 4h of 103, 110, and 100, respectively. Denied CP  Given 2x cardizem bolus 20 mg and initiated on cardizem drip, which was maximized at 15 cc/hr  On exam, he was volume overloaded with pitting edema in his bilateral lower extremities and rales in the bilateral lower lung lobes.  He was given 40 mg IV Lasix with improvement  Initiated on metoprolol tartrate 25 mg, though did not meet blood pressure parameters until 3/24 AM.   TSH wnl  3/24 continues to deny CP and palpitations, though endorses SOB with exertion and episodes of coughing that have improved since 3/23  3/24 cardiology consult recommendin additional dose of IV Lasix 40 mg today  TTE, with further recommendations pending results. Recommending rhythm control with JOSEPH guided cardioversion and attempt for restoration of normal sinus rhythm once euvolemic  Increasing metoprolol tartrate to 50 mg every 6 hours and weaning Cardizem IV to HR's less than 110  Starting Eliquis 5 mg twice daily    Plan:  Cardiology consulted, appreciate recommendations  Follow-up TTE  Metoprolol tartrate 50 mg every 6 hours  Wean IV cardizem gtt for HRs < 110  Eliquis 5 mg twice daily  Continue to monitor on telemetry  Strict I's and O's, daily standing weights  2 g sodium restricted diet with 1.8 L fluid restriction  Monitor renal function and electrolytes, goal K+ > 4 and Mg > 2

## 2025-03-24 NOTE — ASSESSMENT & PLAN NOTE
2 months ago was drinking a case of beer daily.  2 weeks ago has cut down to six pack daily.  No drinks since.  CIWA, supplement

## 2025-03-24 NOTE — ASSESSMENT & PLAN NOTE
Diagnosed with hepatocellular carcinoma proven with tissue biopsy in 8/2022  Endorses having hepatitis C for greater than 20 years, and notes that he refused treatment in the past  Emphasized the high risk of progression to cirrhosis with both hepatitis C and now hepatocellular carcinoma, and patient appeared amenable to starting treatment  On exam, his abdomen was significantly distended and he had pitting edema in his bilateral lower extremities R > L    Plan:  Follow-up abdominal ultrasound  Follow-up hepatitis panel  Consider GI consult pending workup

## 2025-03-24 NOTE — ASSESSMENT & PLAN NOTE
Lab Results   Component Value Date    CREATININE 1.54 (H) 03/24/2025    CREATININE 1.41 (H) 03/23/2025    CREATININE 0.87 10/17/2022     Most recent labs from 2022 indicate creatinine baseline of 0.84-1.07  Given 1 dose of IV Lasix 40 mg on arrival on 3/23, followed by another dose per cardiology on 3/24  Worsening of creatinine on 3/24 AM labs   Ddx volume overload vs underlying complications of A-fib vs underlying hepatorenal disease    Plan:  Monitor AM BMP

## 2025-03-24 NOTE — H&P
H&P - Hospitalist   Name: Eric Yeager 64 y.o. male I MRN: [id number]  Unit/Bed#: ED-09 I Date of Admission: 3/23/2025   Date of Service: 3/23/2025 I Hospital Day: 0     Assessment & Plan  Atrial fibrillation with RVR (HCC)  New onset.  Initial heart rate of 166.  Troponin 103 to 110  Cardizem bolus, Cardizem drip in ED.  Currently maxed on Cardizem.  Will start metoprolol.  Titrate Cardizem to off as able.  Diurese -Lasix 40 mg IV, diuretic naïve  Check echocardiogram  TSH pending  Mg being repleted  Cardiology consult  Alcohol abuse  2 months ago was drinking a case of beer daily.  2 weeks ago has cut down to six pack daily.  No drinks since.  CIWA, supplement  Nicotine dependence  Cessation, NRT  ALTON (acute kidney injury) (HCC)  Creatinine 1.41, prior 0.8 (two years prior) suspect due to fluid status  Monitor BMP with diuresis   Hepatocellular carcinoma (HCC)  Proven by biopsy in 2022.  Appears was lost to follow-up in 2023.  Outpatient referral  Difficulty swallowing  Began about 2 weeks ago.  Unclear etiology.  Currently tolerating oral  We will hold on speech consult      VTE Pharmacologic Prophylaxis: VTE Score: 4 Moderate Risk (Score 3-4) - Pharmacological DVT Prophylaxis Ordered: heparin.  Code Status: Level 3 - DNAR and DNI   Discussion with family: Patient declined call to .     Anticipated Length of Stay: Patient will be admitted on an observation basis with an anticipated length of stay of less than 2 midnights secondary to afib, rvr.    History of Present Illness   Chief Complaint: shortness of breath    Eric Yeager is a 64 y.o. male with a PMH of chronic hepatitis C (untreated), former drug use, alcohol abuse, suspected cirrhosis, hepatocellular carcinoma who presents with cough, shortness of breath and exercise intolerance going for the past 2 to 3 weeks.  He also reports a vague left lower quadrant abdominal discomfort at times.  Denies any fever, chills, chest pain. Reports normal bowel  movements.    Patient found to be in A-fib with RVR.  No prior history of A-fib.  Initial EKG with heart rate of 166.  He was given Cardizem bolus and started on Cardizem drip.  Rates now improved to the 130s.      Patient was previously consuming a significant amount of alcohol.  He reports 2 months ago he was drinking 1 case of beer daily.  Most recently about 2 weeks ago he had cut down to 6 cans of beer daily.  His last drink was 2 weeks ago.  He smokes cigarettes.  He denies any illicit drug use.  Denies any marijuana use.    Review of Systems   Constitutional:  Positive for fatigue. Negative for appetite change and fever.   HENT:  Positive for postnasal drip and trouble swallowing. Negative for sore throat.    Respiratory:  Positive for cough and shortness of breath.         Exercise intolerance   Cardiovascular:  Positive for leg swelling. Negative for chest pain and palpitations.   All other systems reviewed and are negative.      Historical Information   Past Medical History:   Diagnosis Date    Hepatitis C     Pancreatitis      Past Surgical History:   Procedure Laterality Date    IR BIOPSY LIVER MASS  8/31/2022     Social History     Tobacco Use    Smoking status: Every Day     Current packs/day: 0.50     Types: Cigarettes    Smokeless tobacco: Never   Vaping Use    Vaping status: Never Used   Substance and Sexual Activity    Alcohol use: Not Currently     Alcohol/week: 1.0 standard drink of alcohol     Types: 1 Cans of beer per week     Comment: one 12 oz. can yesterday 09/19/22, down from a case a day    Drug use: Never    Sexual activity: Not Currently     E-Cigarette/Vaping    E-Cigarette Use Never User      E-Cigarette/Vaping Substances    Nicotine No     THC No     CBD No     Flavoring No     Other No     Unknown No      No family history on file.  Social History:  Marital Status: Single   Occupation:   Patient Pre-hospital Living Situation: Home  Patient Pre-hospital Level of Mobility:  walks  Patient Pre-hospital Diet Restrictions:     Meds/Allergies   I have reviewed home medications with patient personally.  Prior to Admission medications    Medication Sig Start Date End Date Taking? Authorizing Provider   polyethylene glycol (Golytely) 4000 mL solution Take 4,000 mL by mouth once for 1 dose Take 4000 mL by mouth once for 1 dose. Use as directed 10/17/22 10/17/22  Shelly Levine MD     No Known Allergies    Objective :  Temp:  [97.9 °F (36.6 °C)] 97.9 °F (36.6 °C)  HR:  [118-153] 134  BP: (115-156)/(86-98) 140/95  Resp:  [20-24] 22  SpO2:  [94 %-98 %] 95 %  O2 Device: None (Room air)    Physical Exam  Constitutional:       General: He is not in acute distress.     Appearance: Normal appearance. He is not ill-appearing.   HENT:      Head: Normocephalic and atraumatic.      Right Ear: External ear normal.      Left Ear: External ear normal.      Nose: Nose normal.      Mouth/Throat:      Pharynx: Oropharynx is clear.   Eyes:      Extraocular Movements: Extraocular movements intact.      Conjunctiva/sclera: Conjunctivae normal.   Cardiovascular:      Rate and Rhythm: Tachycardia present. Rhythm irregular.      Pulses: Normal pulses.      Heart sounds: Normal heart sounds.   Pulmonary:      Effort: Pulmonary effort is normal.      Breath sounds: Rales present.   Abdominal:      General: Bowel sounds are normal.      Palpations: Abdomen is soft.   Musculoskeletal:         General: Normal range of motion.      Cervical back: Normal range of motion.      Right lower leg: Edema (+1) present.      Left lower leg: Edema (+1) present.   Skin:     General: Skin is warm.      Capillary Refill: Capillary refill takes less than 2 seconds.   Neurological:      General: No focal deficit present.      Mental Status: He is alert and oriented to person, place, and time.   Psychiatric:         Mood and Affect: Mood normal.         Behavior: Behavior normal.          Lines/Drains:            Lab Results: I have  "reviewed the following results:  Results from last 7 days   Lab Units 03/23/25  1752   WBC Thousand/uL 7.15   HEMOGLOBIN g/dL 14.6   HEMATOCRIT % 46.3   PLATELETS Thousands/uL 166   SEGS PCT % 53   LYMPHO PCT % 31   MONO PCT % 10   EOS PCT % 5     Results from last 7 days   Lab Units 03/23/25  1752   SODIUM mmol/L 136   POTASSIUM mmol/L 4.9   CHLORIDE mmol/L 108   CO2 mmol/L 23   BUN mg/dL 23   CREATININE mg/dL 1.41*   ANION GAP mmol/L 5   CALCIUM mg/dL 8.8   ALBUMIN g/dL 3.7   TOTAL BILIRUBIN mg/dL 1.30*   ALK PHOS U/L 72   ALT U/L 95*   AST U/L 111*   GLUCOSE RANDOM mg/dL 120             No results found for: \"HGBA1C\"        Imaging Results Review: I reviewed radiology reports from this admission including: chest xray.  Other Study Results Review: EKG was reviewed.  A-fib RVR, heart rate 166    Administrative Statements   I have spent a total time of   minutes in caring for this patient on the day of the visit/encounter including Diagnostic results, Prognosis, Risks and benefits of tx options, Instructions for management, Patient and family education, Importance of tx compliance, Risk factor reductions, Impressions, Counseling / Coordination of care, Documenting in the medical record, Reviewing/placing orders in the medical record (including tests, medications, and/or procedures), Obtaining or reviewing history  , and Communicating with other healthcare professionals .    ** Please Note: This note has been constructed using a voice recognition system. **    "

## 2025-03-24 NOTE — PROGRESS NOTES
Progress Note - Hospitalist   Name: Eric Yeager 64 y.o. male I MRN: [id number]  Unit/Bed#: ED-09 I Date of Admission: 3/23/2025   Date of Service: 3/24/2025 I Hospital Day: 0    Assessment & Plan  Atrial fibrillation with RVR (HCC)  New onset with initial HR of 166.  Troponin at 0h, 2h, and 4h of 103, 110, and 100, respectively. Denied CP  Given 2x cardizem bolus 20 mg and initiated on cardizem drip, which was maximized at 15 cc/hr  On exam, he was volume overloaded with pitting edema in his bilateral lower extremities and rales in the bilateral lower lung lobes.  He was given 40 mg IV Lasix with improvement  Initiated on metoprolol tartrate 25 mg, though did not meet blood pressure parameters until 3/24 AM.   TSH wnl  3/24 continues to deny CP and palpitations, though endorses SOB with exertion and episodes of coughing that have improved since 3/23  3/24 cardiology consult recommendin additional dose of IV Lasix 40 mg today  TTE, with further recommendations pending results. Recommending rhythm control with JOSEPH guided cardioversion and attempt for restoration of normal sinus rhythm once euvolemic  Increasing metoprolol tartrate to 50 mg every 6 hours and weaning Cardizem IV to HR's less than 110  Starting Eliquis 5 mg twice daily    Plan:  Cardiology consulted, appreciate recommendations  Follow-up TTE  Metoprolol tartrate 50 mg every 6 hours  Wean IV cardizem gtt for HRs < 110  Eliquis 5 mg twice daily  Continue to monitor on telemetry  Strict I's and O's, daily standing weights  2 g sodium restricted diet with 1.8 L fluid restriction  Monitor renal function and electrolytes, goal K+ > 4 and Mg > 2  Hepatocellular carcinoma (HCC)  Diagnosed with hepatocellular carcinoma proven with tissue biopsy in 2022  Endorses having hepatitis C for greater than 20 years, and notes that he refused treatment in the past  Emphasized the high risk of progression to cirrhosis with both hepatitis C and now hepatocellular  carcinoma, and patient appeared amenable to starting treatment  On exam, his abdomen was significantly distended and he had pitting edema in his bilateral lower extremities R > L    Plan:  Follow-up abdominal ultrasound  Follow-up hepatitis panel  Consider GI consult pending workup  ALTON (acute kidney injury) (HCC)  Lab Results   Component Value Date    CREATININE 1.54 (H) 03/24/2025    CREATININE 1.41 (H) 03/23/2025    CREATININE 0.87 10/17/2022     Most recent labs from 2022 indicate creatinine baseline of 0.84-1.07  Given 1 dose of IV Lasix 40 mg on arrival on 3/23, followed by another dose per cardiology on 3/24  Worsening of creatinine on 3/24 AM labs   Ddx volume overload vs underlying complications of A-fib vs underlying hepatorenal disease    Plan:  Monitor AM BMP  Difficulty swallowing  Began about 2 weeks ago. Unclear etiology.  Currently tolerating oral diet    Plan:  Consider speech consult after workup for hepatocellular carcinoma and A-fib RVR are complete  Alcohol abuse  Previously drank up to a case of beer daily  2 months ago, cut down to 6 beers daily  Currently does not drink alcohol, or use any substances.  Last alcoholic drink was 2 weeks ago    Plan:  CIWA protocol  Supplementation with multivitamin, folic acid 1 mg daily, and thiamine 100 mg daily  Nicotine dependence  Encourage smoking cessation  Nicotine replacement therapy    VTE Pharmacologic Prophylaxis: VTE Score: 4 Moderate Risk (Score 3-4) - Pharmacological DVT Prophylaxis Ordered: apixaban (Eliquis).    Mobility:   Basic Mobility Inpatient Raw Score: 24  JH-HLM Goal: 8: Walk 250 feet or more  JH-HLM Achieved: 5: Stand (1 or more minutes)  JH-HLM Goal NOT achieved. Continue with multidisciplinary rounding and encourage appropriate mobility to improve upon JH-HLM goals.    Patient Centered Rounds: I performed bedside rounds with nursing staff today.   Discussions with Specialists or Other Care Team Provider: Cardiology    Education and  "Discussions with Family / Patient: Patient declined call to .     Current Length of Stay: 0 day(s)  Current Patient Status: Observation   Certification Statement: The patient will continue to require additional inpatient hospital stay due to w/u and treatment of afib RVR and hepatocellular carcinoma  Discharge Plan: Anticipate discharge in 24-48 hrs to discharge location to be determined pending rehab evaluations.    Code Status: Level 3 - DNAR and DNI    Subjective   Eric was seen and evaluated at the bedside. This morning, he endorsed that he felt a little bit better, with decreased frequency of coughing, though he continues to have SOB with exertion and orthopnea. He noted that he felt \"claustrophobic\" in the hospital, inquired about discharge. We discussed the presence of his liver cancer and his chronic hepatitis C infection and have both of these could lead to worsening metastatic disease and cirrhosis, and he was agreeable to staying in the hospital for further workup/treatment.    Objective :  Temp:  [97.9 °F (36.6 °C)] 97.9 °F (36.6 °C)  HR:  [] 91  BP: (101-156)/() 114/79  Resp:  [18-24] 18  SpO2:  [93 %-98 %] 95 %  O2 Device: None (Room air)    There is no height or weight on file to calculate BMI.     Input and Output Summary (last 24 hours):     Intake/Output Summary (Last 24 hours) at 3/24/2025 1350  Last data filed at 3/24/2025 0501  Gross per 24 hour   Intake 570 ml   Output 800 ml   Net -230 ml       Physical Exam  Vitals reviewed.   Constitutional:       General: He is not in acute distress.     Appearance: Normal appearance.   HENT:      Head: Normocephalic and atraumatic.      Mouth/Throat:      Mouth: Mucous membranes are moist.      Pharynx: Oropharynx is clear.   Eyes:      General: No scleral icterus.     Extraocular Movements: Extraocular movements intact.      Conjunctiva/sclera: Conjunctivae normal.   Cardiovascular:      Rate and Rhythm: Tachycardia present. " Rhythm irregularly irregular.      Heart sounds: No murmur heard.     No friction rub. No gallop.   Pulmonary:      Effort: Pulmonary effort is normal. No respiratory distress.      Breath sounds: Normal breath sounds. No stridor. No wheezing or rhonchi.   Abdominal:      General: There is distension.      Palpations: Abdomen is soft.      Tenderness: There is abdominal tenderness (mild, throughout).   Musculoskeletal:      Right lower le+ Pitting Edema present.      Left lower leg: Pitting Edema (trace) present.   Skin:     General: Skin is warm and dry.      Coloration: Skin is not jaundiced.   Neurological:      General: No focal deficit present.      Mental Status: He is alert and oriented to person, place, and time.           Lines/Drains:        Telemetry:  Telemetry Orders (From admission, onward)               24 Hour Telemetry Monitoring  Continuous x 24 Hours (Telem)        Expiring   Question:  Reason for 24 Hour Telemetry  Answer:  Arrhythmias requiring acute medical intervention / PPM or ICD malfunction                     Telemetry Reviewed: Atrial fibrillation. HR averaging 120-130  Indication for Continued Telemetry Use: Arrthymias requiring medical therapy               Lab Results: I have reviewed the following results:   Results from last 7 days   Lab Units 25  0535 25  1752   WBC Thousand/uL 7.39 7.15   HEMOGLOBIN g/dL 13.2 14.6   HEMATOCRIT % 41.8 46.3   PLATELETS Thousands/uL 150 166   SEGS PCT %  --  53   LYMPHO PCT %  --  31   MONO PCT %  --  10   EOS PCT %  --  5     Results from last 7 days   Lab Units 25  0535 25  1752   SODIUM mmol/L 138 136   POTASSIUM mmol/L 4.0 4.9   CHLORIDE mmol/L 108 108   CO2 mmol/L 25 23   BUN mg/dL 24 23   CREATININE mg/dL 1.54* 1.41*   ANION GAP mmol/L 5 5   CALCIUM mg/dL 8.6 8.8   ALBUMIN g/dL  --  3.7   TOTAL BILIRUBIN mg/dL  --  1.30*   ALK PHOS U/L  --  72   ALT U/L  --  95*   AST U/L  --  111*   GLUCOSE RANDOM mg/dL 134 120                        Recent Cultures (last 7 days):         Imaging Results Review: I reviewed radiology reports from this admission including: chest xray and Ultrasound(s).      Last 24 Hours Medication List:     Current Facility-Administered Medications:     acetaminophen (TYLENOL) tablet 650 mg, Q6H PRN    apixaban (ELIQUIS) tablet 5 mg, BID    benzonatate (TESSALON PERLES) capsule 100 mg, TID PRN    diltiazem (CARDIZEM) 125 mg in sodium chloride 0.9 % 125 mL infusion, Titrated, Last Rate: 7.5 mg/hr (03/24/25 1248)    folic acid (FOLVITE) tablet 1 mg, Daily    melatonin tablet 6 mg, HS    metoprolol tartrate (LOPRESSOR) tablet 50 mg, Q6H ZULEYKA    multivitamin-minerals (CENTRUM) tablet 1 tablet, Daily    nicotine (NICODERM CQ) 14 mg/24hr TD 24 hr patch 1 patch, Daily    thiamine tablet 100 mg, Daily    Administrative Statements   Today, Patient Was Seen By: Hannah Abad DO      **Please Note: This note may have been constructed using a voice recognition system.**

## 2025-03-24 NOTE — ED NOTES
Provider notified of pts heart rate and that cardizem is running at max dose. Awaiting cardiology consult     Paty Perez RN  03/24/25 [id number]

## 2025-03-24 NOTE — ASSESSMENT & PLAN NOTE
64-year-old male with history significant for chronic hepatitis C untreated, alcohol use disorder  quitting recently and hepatic cellular carcinoma diagnosed in 2022 lost to follow-up presented to [facility] with chief complaint worsening shortness of breath found to be in acute heart failure exacerbation with atrial fibrillation RVR and possible ALTON versus progression to CKD with hematology/oncology consultation given hepatic cellular carcinoma progression from 3.7 to 9.6 x 6.9 x 5.5 cm.    Charles Walker score approximately A versus B pending INR  Staging CT chest abdomen and pelvis pending    Recommendations:  -Will order alpha-fetoprotein  -Will need hepatitis C treated prior to any systemic therapy  -Consider surgical oncology evaluation if staging imaging shows no signs of metastatic disease

## 2025-03-24 NOTE — PLAN OF CARE
Problem: Potential for Falls  Goal: Patient will remain free of falls  Description: INTERVENTIONS:- Educate patient/family on patient safety including physical limitations- Instruct patient to call for assistance with activity - Consult OT/PT to assist with strengthening/mobility - Keep Call bell within reach- Keep bed low and locked with side rails adjusted as appropriate- Keep care items and personal belongings within reach- Initiate and maintain comfort rounds- Consider moving patient to room near nurses station  INTERVENTIONS:- Educate patient/family on patient safety including physical limitations- Instruct patient to call for assistance with activity - Consult OT/PT to assist with strengthening/mobility - Keep Call bell within reach- Keep bed low and locked with side rails adjusted as appropriate- Keep care items and personal belongings within reach- Initiate and maintain comfort rounds-   Outcome: Progressing     Problem: PAIN - ADULT  Goal: Verbalizes/displays adequate comfort level or baseline comfort level  Description: Interventions:- Encourage patient to monitor pain and request assistance- Assess pain using appropriate pain scale- Administer analgesics based on type and severity of pain and evaluate response- Implement non-pharmacological measures as appropriate and evaluate response- Consider cultural and social influences on pain and pain management- Notify physician/advanced practitioner if interventions unsuccessful or patient reports new pain  Outcome: Progressing     Problem: INFECTION - ADULT  Goal: Absence or prevention of progression during hospitalization  Description: INTERVENTIONS:- Assess and monitor for signs and symptoms of infection- Monitor lab/diagnostic results- Monitor all insertion sites, i.e. indwelling lines, tubes, and drains- Monitor endotracheal if appropriate and nasal secretions for changes in amount and color- [place] appropriate cooling/warming therapies per order-  Administer medications as ordered- Instruct and encourage patient and family to use good hand hygiene technique- Identify and instruct in appropriate isolation precautions for identified infection/condition  Outcome: Progressing     Problem: SAFETY ADULT  Goal: Patient will remain free of falls  Description: INTERVENTIONS:- Educate patient/family on patient safety including physical limitations- Instruct patient to call for assistance with activity - Consult OT/PT to assist with strengthening/mobility - Keep Call bell within reach- Keep bed low and locked with side rails adjusted as appropriate- Keep care items and personal belongings within reach- Initiate and maintain comfort rounds- Make Fall Risk Sign visible to staff- Offer Toileting every 4 Hours, in advance of need- Initiate  INTERVENTIONS:- Educate patient/family on patient safety including physical limitations- Instruct patient to call for assistance with activity - Consult OT/PT to assist with strengthening/mobility - Keep Call bell within reach- Keep bed low and locked with side rails adjusted as appropriate- Keep care items and personal belongings within reach- Initiate and maintain comfort rounds- Make Fall Risk Sign visible to staff- Offer Toileting every 4 Hours, in advance of need-  Outcome: Progressing  Goal: Maintain or return to baseline ADL function  Description: INTERVENTIONS:-  Assess patient's ability to carry out ADLs; assess patient's baseline for ADL function and identify physical deficits which impact ability to perform ADLs (bathing, care of mouth/teeth, toileting, grooming, dressing, etc.)- Assess/evaluate cause of self-care deficits - Assess range of motion- Assess patient's mobility; develop plan if impaired- Assess patient's need for assistive devices and provide as appropriate- Encourage maximum independence but intervene and supervise when necessary- Involve family in performance of ADLs- Assess for home care needs following  discharge - Consider OT consult to assist with ADL evaluation and planning for discharge- Provide patient education as appropriate  Outcome: Progressing  Goal: Maintains/Returns to pre admission functional level  Description: INTERVENTIONS:- Perform AM-PAC 6 Click Basic Mobility/ Daily Activity assessment daily.- Set and communicate daily mobility goal to care team and patient/family/caregiver. - Collaborate with rehabilitation services on mobility goals if consulted- Perform Range of Motion 1 times a day.- Reposition patient every 4 hours.- Dangle patient 3 times a day- Stand patient 3 times a day- Ambulate patient 3 times a day- Out of bed to chair 3 times a day - Out of bed for meals 3 times a day- Out of bed for toileting- Record patient progress and toleration of activity level   Outcome: Progressing     Problem: DISCHARGE PLANNING  Goal: Discharge to home or other facility with appropriate resources  Description: INTERVENTIONS:- Identify barriers to discharge w/patient and caregiver- Arrange for needed discharge resources and transportation as appropriate- Identify discharge learning needs (meds, wound care, etc.)- Arrange for interpretive services to assist at discharge as needed- Refer to Case Management Department for coordinating discharge planning if the patient needs post-hospital services based on physician/advanced practitioner order or complex needs related to functional status, cognitive ability, or social support system  Outcome: Progressing     Problem: Knowledge Deficit  Goal: Patient/family/caregiver demonstrates understanding of disease process, treatment plan, medications, and discharge instructions  Description: Complete learning assessment and assess knowledge base.Interventions:- Provide teaching at level of understanding- Provide teaching via preferred learning methods  Outcome: Progressing     Problem: CARDIOVASCULAR - ADULT  Goal: Maintains optimal cardiac output and hemodynamic  stability  Description: INTERVENTIONS:- Monitor I/O, vital signs and rhythm- Monitor for S/S and trends of decreased cardiac output- Administer and titrate ordered vasoactive medications to optimize hemodynamic stability- Assess quality of pulses, skin color and temperature- Assess for signs of decreased coronary artery perfusion- Instruct patient to report change in severity of symptoms  Outcome: Progressing  Goal: Absence of cardiac dysrhythmias or at baseline rhythm  Description: INTERVENTIONS:- Continuous cardiac monitoring, vital signs, obtain 12 lead EKG if ordered- Administer antiarrhythmic and heart rate control medications as ordered- Monitor electrolytes and administer replacement therapy as ordered  Outcome: Progressing

## 2025-03-24 NOTE — ASSESSMENT & PLAN NOTE
Began about 2 weeks ago.  Unclear etiology.  Currently tolerating oral  We will hold on speech consult

## 2025-03-24 NOTE — ASSESSMENT & PLAN NOTE
New onset.  Initial heart rate of 166.  Troponin 103 to 110  Cardizem bolus, Cardizem drip in ED.  Currently maxed on Cardizem.  Will start metoprolol.  Titrate Cardizem to off as able.  Diurese -Lasix 40 mg IV, diuretic naïve  Check echocardiogram  TSH pending  Mg being repleted  Cardiology consult

## 2025-03-24 NOTE — ASSESSMENT & PLAN NOTE
Previously drank up to a case of beer daily  2 months ago, cut down to 6 beers daily  Currently does not drink alcohol, or use any substances.  Last alcoholic drink was 2 weeks ago    Plan:  [facility] protocol  Supplementation with multivitamin, folic acid 1 mg daily, and thiamine 100 mg daily

## 2025-03-24 NOTE — ASSESSMENT & PLAN NOTE
Creatinine 1.41, prior 0.8 (two years prior) suspect due to fluid status  Monitor BMP with diuresis

## 2025-03-24 NOTE — CONSULTS
Consultation - Oncology-Medical   Name: Eric Yeager 64 y.o. male I MRN: [id number]  Unit/Bed#: ED-09 I Date of Admission: 3/23/2025   Date of Service: 3/24/2025 I Hospital Day: 0   Inpatient consult to Oncology  Consult performed by: Rodney Perez DO  Consult ordered by: Hannah Abad DO        Physician Requesting Evaluation: Desire Berman MD   Reason for Evaluation / Principal Problem: HCC lost to follow up     Assessment & Plan  Atrial fibrillation with RVR (HCC)    Alcohol abuse    Hepatocellular carcinoma (HCC)    ALTON (acute kidney injury) (HCC)      64-year-old male with history significant for chronic hepatitis C untreated, alcohol use disorder  quitting recently and hepatic cellular carcinoma diagnosed in 2022 lost to follow-up presented to [facility] with chief complaint worsening shortness of breath found to be in acute heart failure exacerbation with atrial fibrillation RVR and possible ALTON versus progression to CKD with hematology/oncology consultation given hepatic cellular carcinoma progression from 3.7 to 9.6 x 6.9 x 5.5 cm.    Charles Walker score approximately A versus B pending INR  Staging CT chest abdomen and pelvis pending    Recommendations:  -Will order alpha-fetoprotein  -Will need hepatitis C treated prior to any systemic therapy  -Consider surgical oncology evaluation if staging imaging shows no signs of metastatic disease      History of Present Illness     64-year-old male with past medical history significant for chronic hepatitis C untreated, alcohol use disorder with recent abstinence, and HCC diagnosed 2022 lost to follow up presented to [facility] 3/23 with chief complaint of worsening shortness of breath found to be in acute heart failure exacerbation with echo pending in the setting of atrial fibrillation with RVR and ALTON versus progression of CKD creatinine currently 1.54 previously 0.87 in 2022 with right upper quadrant ultrasound showing progression  of hepatic cellular carcinoma 9.6 x 6.9 x 5.5 cm previously measuring 3.7 m greatest in diameter on MRI from 8/2022.    Hematology/oncology consultation given hepatic cellular carcinoma to help reestablish care.    Of note, patient has never received treatment for hepatitis C.  Labs, total bili 1. 3, INR not checked, Albumin >3.5, Ascites absent, no encephalopathy.    Patient seen at bedside, receiving echocardiogram.  Noted to have quit alcohol over a month ago.  Understands that he still has hepatitis C infection and needs a treated prior to receiving any systemic therapy.  However we are unsure if he is candidate for surgical resection versus systemic therapy.  This will be based on staging with patient and brother was discussed on the phone and had no additional questions at this time    Patient notes better feeling of lightheadedness and shortness of breath with controlled heart rate      Review of Systems   All other systems reviewed and are negative.    Oncology History:   Cancer Staging   No matching staging information was found for the patient.    Oncology History   Hepatocellular carcinoma (HCC)   8/31/2022 Biopsy    A. Liver, Liver mass, right lobe:  - Hepatocellular carcinoma.     9/9/2022 Initial Diagnosis    Hepatocellular carcinoma (HCC)       Current Facility-Administered Medications   Medication Dose Route Frequency Provider Last Rate Last Admin    acetaminophen (TYLENOL) tablet 650 mg  650 mg Oral Q6H PRN DAGOBERTO Joyce        apixaban (ELIQUIS) tablet 5 mg  5 mg Oral BID Ivania Desai MD   5 mg at 03/24/25 1256    benzonatate (TESSALON PERLES) capsule 100 mg  100 mg Oral TID PRN DAGOBERTO Joyce        diltiazem (CARDIZEM) 125 mg in sodium chloride 0.9 % 125 mL infusion  1-15 mg/hr Intravenous Titrated Andrew Rehman MD 7.5 mL/hr at 03/24/25 1248 7.5 mg/hr at 03/24/25 1248    folic acid (FOLVITE) tablet 1 mg  1 mg Oral Daily DAGOBERTO Joyce   1 mg at 03/24/25 0807    melatonin tablet 6  mg  6 mg Oral HS Hannah Abad,         metoprolol tartrate (LOPRESSOR) tablet 50 mg  50 mg Oral Q6H ZULEYKA Patrick DAGOBERTO Figueroa        multivitamin-minerals (CENTRUM) tablet 1 tablet  1 tablet Oral Daily Naheed ChapmanDAGOBERTO amadna   1 tablet at 03/24/25 0807    nicotine (NICODERM CQ) 14 mg/24hr TD 24 hr patch 1 patch  1 patch Transdermal Daily NaheedDAGOBERTO Sands   1 patch at 03/24/25 0807    thiamine tablet 100 mg  100 mg Oral Daily Naheed DAGOBERTO Tovar   100 mg at 03/24/25 0807     Current Outpatient Medications   Medication Sig Dispense Refill    polyethylene glycol (Golytely) 4000 mL solution Take 4,000 mL by mouth once for 1 dose Take 4000 mL by mouth once for 1 dose. Use as directed 4000 mL 0       Objective :  Temp:  [97.9 °F (36.6 °C)] 97.9 °F (36.6 °C)  HR:  [] 95  BP: (101-156)/() 108/79  Resp:  [18-24] 18  SpO2:  [93 %-98 %] 97 %  O2 Device: None (Room air)    Physical Exam  Constitutional:       General: He is not in acute distress.  HENT:      Head: Normocephalic and atraumatic.      Nose: Nose normal.   Eyes:      General: No scleral icterus.     Extraocular Movements: Extraocular movements intact.      Pupils: Pupils are equal, round, and reactive to light.   Cardiovascular:      Rate and Rhythm: Tachycardia present. Rhythm irregular.      Heart sounds: No murmur heard.  Pulmonary:      Effort: Pulmonary effort is normal. No respiratory distress.      Breath sounds: No rales.   Abdominal:      Palpations: Abdomen is soft. There is no mass.   Musculoskeletal:         General: Normal range of motion.      Cervical back: Normal range of motion.      Right lower leg: No edema.      Left lower leg: No edema.   Lymphadenopathy:      Cervical: No cervical adenopathy.   Skin:     General: Skin is warm.   Neurological:      General: No focal deficit present.   Psychiatric:         Mood and Affect: Mood normal.         Lab Results: I have reviewed the following results:  Lab Results   Component Value Date     K 4.0 03/24/2025     03/24/2025    CO2 25 03/24/2025    BUN 24 03/24/2025    CREATININE 1.54 (H) 03/24/2025    CALCIUM 8.6 03/24/2025    CORRECTEDCA 9.5 08/31/2022     (H) 03/23/2025    ALT 95 (H) 03/23/2025    ALKPHOS 72 03/23/2025    EGFR 47 03/24/2025     Lab Results   Component Value Date    WBC 7.39 03/24/2025    HGB 13.2 03/24/2025    HCT 41.8 03/24/2025    MCV 97 03/24/2025     03/24/2025     Lab Results   Component Value Date    NEUTROABS 3.86 03/23/2025     Imaging:     US RUQ:  IMPRESSION:  Enlarged right hepatic mass now measuring 9.6 cm measured 3.7 cm on the 8/29/2022 MRI.     Patent hepatic vessels.    Rodney Perez DO  PGY-4 Hematology/Oncology Fellow

## 2025-03-25 NOTE — DEATH NOTE
INPATIENT DEATH NOTE  Eric Yeager 64 y.o. male MRN: [id number]  Unit/Bed#: ICU 09 Encounter: [id number]             PHYSICAL EXAM:  Unresponsive to noxious stimuli, Spontaneous respirations absent, Breath sounds absent, Carotid pulse absent, Heart sounds absent, and Corneal blink reflex absent    Medical Examiner notification criteria:  NONE APPLICABLE   Medical Examiner's office notified?:  No, does not meet ME notification criteria   Medical Examiner accepted case?:  No  Name of Medical Examiner: Resident on call, Tremayen Gibson MD         Autopsy Options:  Decision for post-mortem examination not yet made by next of kin.    Primary Service Attending Physician notified?:  no    Physician/Resident responsible for completing Discharge Summary:  Tremayne Gibson MD

## 2025-03-25 NOTE — QUICK NOTE
Around 3:30 AM I got a message from the nurse for patient complaining of severe lower abdominal pain.  I went to assist the patient at bedside.    At bedside, patient was alert oriented x 3 but in severe pain and then chronically ill status.    His blood pressure was 110/68, O2 saturation 96% on room air, normal temperature but bradycardic.    Patient has been complaining of severe lower abdominal pain which he reports it was present on admission but resolved since then .patient denied chest pain, SOB, nausea or vomiting, headache, dizziness.  Patient has been having regular bowel movement.      On examination, abdominal was mildly distended without guarding/rebound/diffuse tenderness.  Skin was cool to touch however pulses were +2 in all extremities, lungs were clear to auscultation, and heart examination showed bradycardia without murmurs or gallops.    Per telemetry events review, patient has became bradycardic in the 40s-50s.  Patient was still on Cardizem drip 12.5 mg.  I advised the nurse to titrate down the Cardizem drip to 10.    Suddenly, patient O2 dropped to 80s and he became unresponsive.    Rapid response was called (please refer to the rapid response critical care note for further documentation)      I called the brother, Newton Yeager on phone #[phone] with no response and left a voice message.  I called the other contact number friend Go Vee, on phone #[phone] and informed him about the critical situation the patient is in and to be present to the hospital as soon as possible and try to contact the family members as well.

## 2025-03-25 NOTE — RAPID RESPONSE
Rapid Response Note  Eric Yeager 64 y.o. male MRN: [id number]  Unit/Bed#: S -01 Encounter: [id number]    Rapid Response Notification(s):   Response called date/time:  3/25/2025 4:00 AM  Response team arrival date/time:  3/25/2025 4:03 AM  Level of care:  [facility]  Rapid response location:  [facility]  Primary reason for rapid response call:  Acute change in heart rate    Rapid Response Intervention(s):   Airway:  None  Breathing:  Oxygen  Circulation:  None  Fluids administered:  None  Medications administered:  Calcium  Level 3- DNR/DNI          Assessment:  Bradycardia   Afib w/ RVR  Abdominal Pain- Chronic   Acute change in mental status?  Acute Respiratory Failure w/ Hypoxia   Afebrile, HR 40s-50s, BP systolic 140s, , tachypneic   Conversing complaining of abdominal pain   Rapid called d/t acute change in HR and questionable acute change in mentation       Plan:   Discontinue Cardizem gtt ( was on 12), bradycardic and EF 25% put him at risk for cardiogenic shock  STAT Chest Xray   Calcium Gluconate   CBC, CMP, Lactate, VBG, Troponin, Ammonia  Lasix 40mg IV x1    Addendum-Upon Arrival into the ICU  Initially was conversational, continued to be bradycardic in the 40s-50s   Became acutely unresponsive, continued bradycardia, and progressively hypotensive  Agonal breathing     Plan upon arrival in ICU:  1mg Atropine was given   STAT CTH ordered   Started on Epinephrine 10 and escalated to 20  1 amp of Bicarbonate and 1g Calcium Chloride   Cardiac US demonstrated severely depressed EF <10%  Lost pulses and perfusing pressure   Repeat Cardiac US demonstrated no cardiac function   Asystole   Primary Team aware of rapid decline and status   Attempted to call Brother and left voicemail of his rapid decline prior to asystole      Rapid Response Outcome:   Transfer:  Transfer to stepdown 2  Primary service notified of transfer: Yes    Code Status: Level 3 (DNAR and DNI)      Family notified: Primary team to  notify Family Member       Background/Situation:   Eric Yeager is a 64 y.o. male PMH Hep C, Suspected Cirrhosis, HCC who initially presented to the hospital due to SOB. Was found to be in Afib with RVR and placed on a cardizem infusion. Cardiology also following in setting of new onset afib, started on metoprolol tartrate to wean cardizem, was also considering JOSEPH w/ cardioversion once volume status was improved. RRT called d/t acute change in HR of bradycardia and questionable acute change in mentation. Patient complaining of abdominal pain upon our arrival but noted this was chronic for him.     Review of Systems   Constitutional:  Negative for chills and fever.   HENT:  Negative for ear pain and sore throat.    Eyes:  Negative for pain and visual disturbance.   Respiratory:  Negative for cough, chest tightness and shortness of breath.    Cardiovascular:  Negative for chest pain and palpitations.   Gastrointestinal:  Positive for abdominal pain. Negative for vomiting.   Genitourinary:  Negative for dysuria and hematuria.   Musculoskeletal:  Negative for arthralgias and back pain.   Skin:  Negative for color change and rash.   Neurological:  Negative for dizziness, seizures, syncope and headaches.   Psychiatric/Behavioral:  Negative for agitation and confusion.    All other systems reviewed and are negative.      Objective:   Vitals:    03/24/25 1740 03/24/25 2138 03/24/25 2200 03/25/25 0020   BP: 124/78 104/68 104/68 109/76   BP Location: Left arm      Pulse: 77   60   Resp: 18      Temp:       TempSrc:       SpO2: 96%   96%   Weight: 82.1 kg (181 lb)      Height:         Physical Exam at time of rapid   Physical Exam  Vitals and nursing note reviewed.   Constitutional:       General: He is not in acute distress.     Appearance: He is well-developed. He is ill-appearing. He is not toxic-appearing or diaphoretic.   HENT:      Head: Normocephalic and atraumatic.      Mouth/Throat:      Mouth: Mucous membranes are dry.    Eyes:      Extraocular Movements: Extraocular movements intact.      Conjunctiva/sclera: Conjunctivae normal.      Pupils: Pupils are equal, round, and reactive to light.   Cardiovascular:      Rate and Rhythm: Bradycardia present. Rhythm irregular.      Pulses: Normal pulses.      Heart sounds: No murmur heard.     No friction rub. No gallop.   Pulmonary:      Effort: Pulmonary effort is normal.      Breath sounds: No stridor. Rales present.   Chest:      Chest wall: No tenderness.   Abdominal:      General: Bowel sounds are normal. There is no distension.      Palpations: Abdomen is soft.      Tenderness: There is no abdominal tenderness. There is no guarding or rebound.   Musculoskeletal:         General: No swelling.      Cervical back: Normal range of motion and neck supple.      Right lower leg: Edema present.      Left lower leg: Edema present.      Comments: Extremities cold    Skin:     General: Skin is warm and dry.      Capillary Refill: Capillary refill takes less than 2 seconds.   Neurological:      General: No focal deficit present.      Mental Status: He is alert and oriented to person, place, and time. Mental status is at baseline.   Psychiatric:         Mood and Affect: Mood normal.         Thought Content: Thought content normal.

## 2025-03-25 NOTE — DISCHARGE SUMMARY
Admission Date: 3/23/2025   Admitting Diagnosis: CHF (congestive heart failure) (HCC) [I50.9]  Hepatocellular carcinoma (HCC) [C22.0]  SOB (shortness of breath) [R06.02]  Elevated troponin [R79.89]  Atrial fibrillation with RVR (HCC) [I48.91]  Elevated brain natriuretic peptide (BNP) level [R79.89]  Chronic hepatitis C without hepatic coma (HCC) [B18.2]  Discharge Date: 25        Procedures Performed:   Orders Placed This Encounter   Procedures    ED ECG Documentation Only       Summary of Hospital Course:   Patient is a 64 y.o. male PMH Hep C, Suspected Cirrhosis, HCC who initially presented to the hospital due to SOB. Was found to be in Afib with RVR and placed on a cardizem infusion. Cardiology also following in setting of new onset afib, started on metoprolol tartrate to wean cardizem, was also considering JOSEPH w/ cardioversion once volume status was improved.   On 3/25/2025 RRT called d/t acute change in HR of bradycardia and questionable acute change in mentation. Patient complaining of abdominal pain upon our arrival but noted this was chronic for him.  Cardizem drip was discontinued due to bradycardia and hypotension and patient was transferred to the ICU.  Patient continued to be in A-fib  Patient status continued to decline.  He became unresponsive, hypotensive, hypoxic with acute respiratory failure.  Patient was DNR/DNI and he was pronounced dead on 3/25/2025 at 5:15 AM.    Family member (brother) was contacted however unable to reach and left a voice message.  We reach out to his friend who responded and presented at bedside.        Disposition:        Medical Problems       Resolved Problems  Date Reviewed: 2024   None         Condition at Time of Death: critical illness  Date, Time and Cause of Death    Date of Death: 3/25/25  Time of Death:  5:15 AM  Preliminary Cause of Death: Atrial fibrillation         Death Note:  INPATIENT DEATH NOTE  Eric Yeager 64 y.o. male MRN:  [id number]  Unit/Bed#: ICU 09 Encounter: [id number]             PHYSICAL EXAM:  Unresponsive to noxious stimuli, Spontaneous respirations absent, Breath sounds absent, Carotid pulse absent, Heart sounds absent, and Corneal blink reflex absent    Medical Examiner notification criteria:  NONE APPLICABLE   Medical Examiner's office notified?:  No, does not meet ME notification criteria   Medical Examiner accepted case?:  No  Name of Medical Examiner: Resident on call, Tremayne Gibson MD         Autopsy Options:  Decision for post-mortem examination not yet made by next of kin.    Primary Service Attending Physician notified?:  no    Physician/Resident responsible for completing Discharge Summary:  Tremayne Gibson MD

## 2025-03-25 NOTE — PROGRESS NOTES
RN entered room, pt complaining of abdominal pain. RN notified resident. Resident at bedside. Resident went outside to place orders in computer, RN with patient at bedside, patient becomes unresponsive. Rapid response called. See rapid response charting.

## 2025-03-25 NOTE — PLAN OF CARE
Problem: Potential for Falls  Goal: Patient will remain free of falls  Description: INTERVENTIONS:- Educate patient/family on patient safety including physical limitations- Instruct patient to call for assistance with activity - Consult OT/PT to assist with strengthening/mobility - Keep Call bell within reach- Keep bed low and locked with side rails adjusted as appropriate- Keep care items and personal belongings within reach- Initiate and maintain comfort rounds- Make Fall Risk Sign visible to staff- Offer Toileting every 2 Hours, in advance of need- Initiate/Maintain bed alarm- Obtain necessary fall risk management equipment: yellow socks- Apply yellow socks and bracelet for high fall risk patients- Consider moving patient to room near nurses station  INTERVENTIONS:- Educate patient/family on patient safety including physical limitations- Instruct patient to call for assistance with activity - Consult OT/PT to assist with strengthening/mobility - Keep Call bell within reach- Keep bed low and locked with side rails adjusted as appropriate- Keep care items and personal belongings within reach- Initiate and maintain comfort rounds- Make Fall Risk Sign visible to staff- Offer Toileting every 2 Hours, in advance of need- Initiate/Maintain alarm- Obtain necessary fall risk management equipment: yellow socks- Apply yellow socks and bracelet for high fall risk patients- Consider moving patient to room near nurses station  Outcome: Progressing     Problem: PAIN - ADULT  Goal: Verbalizes/displays adequate comfort level or baseline comfort level  Description: Interventions:- Encourage patient to monitor pain and request assistance- Assess pain using appropriate pain scale- Administer analgesics based on type and severity of pain and evaluate response- Implement non-pharmacological measures as appropriate and evaluate response- Consider cultural and social influences on pain and pain management- Notify physician/advanced  practitioner if interventions unsuccessful or patient reports new pain  Outcome: Progressing     Problem: INFECTION - ADULT  Goal: Absence or prevention of progression during hospitalization  Description: INTERVENTIONS:- Assess and monitor for signs and symptoms of infection- Monitor lab/diagnostic results- Monitor all insertion sites, i.e. indwelling lines, tubes, and drains- Monitor endotracheal if appropriate and nasal secretions for changes in amount and color- [place] appropriate cooling/warming therapies per order- Administer medications as ordered- Instruct and encourage patient and family to use good hand hygiene technique- Identify and instruct in appropriate isolation precautions for identified infection/condition  Outcome: Progressing     Goal: Maintain or return to baseline ADL function  Description: INTERVENTIONS:-  Assess patient's ability to carry out ADLs; assess patient's baseline for ADL function and identify physical deficits which impact ability to perform ADLs (bathing, care of mouth/teeth, toileting, grooming, dressing, etc.)- Assess/evaluate cause of self-care deficits - Assess range of motion- Assess patient's mobility; develop plan if impaired- Assess patient's need for assistive devices and provide as appropriate- Encourage maximum independence but intervene and supervise when necessary- Involve family in performance of ADLs- Assess for home care needs following discharge - Consider OT consult to assist with ADL evaluation and planning for discharge- Provide patient education as appropriate  Outcome: Progressing  Goal: Maintains/Returns to pre admission functional level  Description: INTERVENTIONS:- Perform AM-PAC 6 Click Basic Mobility/ Daily Activity assessment daily.- Set and communicate daily mobility goal to care team and patient/family/caregiver. - Collaborate with rehabilitation services on mobility goals if consulted- Perform Range of Motion 2 times a day.- Reposition patient  every 2 hours.- Dangle patient 2 times a day- Stand patient 2 times a day- Ambulate patient 2 times a day- Out of bed to chair 2 times a day - Out of bed for meals 2 times a day- Out of bed for toileting- Record patient progress and toleration of activity level   Outcome: Progressing     Problem: DISCHARGE PLANNING  Goal: Discharge to home or other facility with appropriate resources  Description: INTERVENTIONS:- Identify barriers to discharge w/patient and caregiver- Arrange for needed discharge resources and transportation as appropriate- Identify discharge learning needs (meds, wound care, etc.)- Arrange for interpretive services to assist at discharge as needed- Refer to Case Management Department for coordinating discharge planning if the patient needs post-hospital services based on physician/advanced practitioner order or complex needs related to functional status, cognitive ability, or social support system  Outcome: Progressing     Problem: Knowledge Deficit  Goal: Patient/family/caregiver demonstrates understanding of disease process, treatment plan, medications, and discharge instructions  Description: Complete learning assessment and assess knowledge base.Interventions:- Provide teaching at level of understanding- Provide teaching via preferred learning methods  Outcome: Progressing     Problem: CARDIOVASCULAR - ADULT  Goal: Maintains optimal cardiac output and hemodynamic stability  Description: INTERVENTIONS:- Monitor I/O, vital signs and rhythm- Monitor for S/S and trends of decreased cardiac output- Administer and titrate ordered vasoactive medications to optimize hemodynamic stability- Assess quality of pulses, skin color and temperature- Assess for signs of decreased coronary artery perfusion- Instruct patient to report change in severity of symptoms  Outcome: Progressing  Goal: Absence of cardiac dysrhythmias or at baseline rhythm  Description: INTERVENTIONS:- Continuous cardiac monitoring, vital  signs, obtain 12 lead EKG if ordered- Administer antiarrhythmic and heart rate control medications as ordered- Monitor electrolytes and administer replacement therapy as ordered  Outcome: Progressing